# Patient Record
Sex: FEMALE | Race: BLACK OR AFRICAN AMERICAN | Employment: OTHER | URBAN - METROPOLITAN AREA
[De-identification: names, ages, dates, MRNs, and addresses within clinical notes are randomized per-mention and may not be internally consistent; named-entity substitution may affect disease eponyms.]

---

## 2017-01-19 PROBLEM — R07.9 CHEST PAIN: Status: ACTIVE | Noted: 2017-01-19

## 2017-01-19 PROBLEM — K21.9 GERD (GASTROESOPHAGEAL REFLUX DISEASE): Status: ACTIVE | Noted: 2017-01-19

## 2017-01-19 PROBLEM — F41.9 CHRONIC ANXIETY: Status: ACTIVE | Noted: 2017-01-19

## 2017-01-19 PROBLEM — F32.A DEPRESSION: Status: ACTIVE | Noted: 2017-01-19

## 2017-02-01 PROBLEM — R07.89 ATYPICAL CHEST PAIN: Status: ACTIVE | Noted: 2017-01-19

## 2017-03-09 ENCOUNTER — ANESTHESIA EVENT (OUTPATIENT)
Dept: ENDOSCOPY | Age: 82
End: 2017-03-09
Payer: MEDICARE

## 2017-03-09 RX ORDER — SODIUM CHLORIDE 0.9 % (FLUSH) 0.9 %
5-10 SYRINGE (ML) INJECTION AS NEEDED
Status: CANCELLED | OUTPATIENT
Start: 2017-03-09

## 2017-03-09 RX ORDER — SODIUM CHLORIDE, SODIUM LACTATE, POTASSIUM CHLORIDE, CALCIUM CHLORIDE 600; 310; 30; 20 MG/100ML; MG/100ML; MG/100ML; MG/100ML
100 INJECTION, SOLUTION INTRAVENOUS CONTINUOUS
Status: CANCELLED | OUTPATIENT
Start: 2017-03-09

## 2017-03-10 ENCOUNTER — HOSPITAL ENCOUNTER (OUTPATIENT)
Age: 82
Setting detail: OUTPATIENT SURGERY
Discharge: HOME | End: 2017-03-10
Attending: INTERNAL MEDICINE | Admitting: INTERNAL MEDICINE
Payer: MEDICARE

## 2017-03-10 ENCOUNTER — ANESTHESIA (OUTPATIENT)
Dept: ENDOSCOPY | Age: 82
End: 2017-03-10
Payer: MEDICARE

## 2017-03-10 ENCOUNTER — SURGERY (OUTPATIENT)
Age: 82
End: 2017-03-10

## 2017-03-10 VITALS
RESPIRATION RATE: 18 BRPM | HEIGHT: 60 IN | BODY MASS INDEX: 27.48 KG/M2 | TEMPERATURE: 98.6 F | WEIGHT: 140 LBS | HEART RATE: 62 BPM | DIASTOLIC BLOOD PRESSURE: 69 MMHG | OXYGEN SATURATION: 98 % | SYSTOLIC BLOOD PRESSURE: 124 MMHG

## 2017-03-10 PROCEDURE — 74011250636 HC RX REV CODE- 250/636: Performed by: ANESTHESIOLOGY

## 2017-03-10 PROCEDURE — 76060000032 HC ANESTHESIA 0.5 TO 1 HR: Performed by: INTERNAL MEDICINE

## 2017-03-10 PROCEDURE — 74011250636 HC RX REV CODE- 250/636

## 2017-03-10 PROCEDURE — 77030013991 HC SNR POLYP ENDOSC BSC -A: Performed by: INTERNAL MEDICINE

## 2017-03-10 PROCEDURE — 74011000250 HC RX REV CODE- 250

## 2017-03-10 PROCEDURE — 77030011640 HC PAD GRND REM COVD -A: Performed by: INTERNAL MEDICINE

## 2017-03-10 PROCEDURE — 77030009426 HC FCPS BIOP ENDOSC BSC -B: Performed by: INTERNAL MEDICINE

## 2017-03-10 PROCEDURE — 88305 TISSUE EXAM BY PATHOLOGIST: CPT | Performed by: INTERNAL MEDICINE

## 2017-03-10 PROCEDURE — 76040000025: Performed by: INTERNAL MEDICINE

## 2017-03-10 RX ORDER — PROPOFOL 10 MG/ML
INJECTION, EMULSION INTRAVENOUS AS NEEDED
Status: DISCONTINUED | OUTPATIENT
Start: 2017-03-10 | End: 2017-03-10 | Stop reason: HOSPADM

## 2017-03-10 RX ORDER — PROPOFOL 10 MG/ML
INJECTION, EMULSION INTRAVENOUS
Status: DISCONTINUED | OUTPATIENT
Start: 2017-03-10 | End: 2017-03-10 | Stop reason: HOSPADM

## 2017-03-10 RX ORDER — SODIUM CHLORIDE, SODIUM LACTATE, POTASSIUM CHLORIDE, CALCIUM CHLORIDE 600; 310; 30; 20 MG/100ML; MG/100ML; MG/100ML; MG/100ML
100 INJECTION, SOLUTION INTRAVENOUS CONTINUOUS
Status: DISCONTINUED | OUTPATIENT
Start: 2017-03-10 | End: 2017-03-10 | Stop reason: HOSPADM

## 2017-03-10 RX ORDER — LIDOCAINE HYDROCHLORIDE 20 MG/ML
INJECTION, SOLUTION EPIDURAL; INFILTRATION; INTRACAUDAL; PERINEURAL AS NEEDED
Status: DISCONTINUED | OUTPATIENT
Start: 2017-03-10 | End: 2017-03-10 | Stop reason: HOSPADM

## 2017-03-10 RX ADMIN — LIDOCAINE HYDROCHLORIDE 60 MG: 20 INJECTION, SOLUTION EPIDURAL; INFILTRATION; INTRACAUDAL; PERINEURAL at 10:53

## 2017-03-10 RX ADMIN — SODIUM CHLORIDE, SODIUM LACTATE, POTASSIUM CHLORIDE, AND CALCIUM CHLORIDE: 600; 310; 30; 20 INJECTION, SOLUTION INTRAVENOUS at 10:49

## 2017-03-10 RX ADMIN — PROPOFOL 10 MG: 10 INJECTION, EMULSION INTRAVENOUS at 10:53

## 2017-03-10 RX ADMIN — SODIUM CHLORIDE, SODIUM LACTATE, POTASSIUM CHLORIDE, AND CALCIUM CHLORIDE 100 ML/HR: 600; 310; 30; 20 INJECTION, SOLUTION INTRAVENOUS at 10:24

## 2017-03-10 RX ADMIN — PROPOFOL 50 MCG/KG/MIN: 10 INJECTION, EMULSION INTRAVENOUS at 10:53

## 2017-03-10 NOTE — PROGRESS NOTES
Pt discharged via wheelchair by Yazmin Jung RN. VSS on room air. Dr. Nury Newell spoke with pt and family at bedside.

## 2017-03-10 NOTE — IP AVS SNAPSHOT
[address] 
[phone] Patient: Ayana Sears MRN: [id number]:[id number] You are allergic to the following Allergen Reactions Penicillins Anaphylaxis Plavix (Clopidogrel) Other (comments) Joints ache Aspirin Other (comments) Stomach burn with uncoated aspirin; pt takes coated 81 mg aspirin daily Lipitor (Atorvastatin) Rash Swelling Other (comments) Joint swelling Lyrica (Pregabalin) Other (comments)  
 inflammation Ointment,Polyethylene Glycol Other (comments) Stomach cramps/cp Soy Itching Recent Documentation Height Weight Breastfeeding? BMI OB Status Smoking Status 1.524 m 63.5 kg No 27.34 kg/m2 Postmenopausal Never Smoker Emergency Contacts Name Discharge Info Relation Home Work Mobile Renetta Figueroa  Child [2] [phone] John Paul Abel  Daughter [21] [phone] DangeloPriscilaMachelle  Other Relative [6] [phone] About your hospitalization You were admitted on:  March 10, 2017 You last received care in the:  [facility] You were discharged on:  March 10, 2017 Unit phone number:  [phone] Why you were hospitalized Your primary diagnosis was:  Not on File Providers Seen During Your Hospitalizations Provider Role Specialty Primary office phone Ruy Wisdom MD Attending Provider Gastroenterology [phone] Your Primary Care Physician (PCP) Primary Care Physician Office Phone Office Fax Bre[place] Daily [phone] Follow-up Information None Current Discharge Medication List  
  
ASK your doctor about these medications Dose & Instructions Dispensing Information Comments Morning Noon Evening Bedtime  
 aspirin 81 mg tablet Your next dose is: Today, Tomorrow Other:  _________ Dose:  81 mg Take 81 mg by mouth every morning. Patient has cardiac stent x 1 Refills:  0  
     
   
   
   
  
 CO Q-10 100 mg capsule Generic drug:  co-enzyme Q-10 Your next dose is: Today, Tomorrow Other:  _________ Dose:  100 mg Take 100 mg by mouth daily. Refills:  0  
     
   
   
   
  
 magnesium oxide 400 mg tablet Commonly known as:  MAG-OX Your next dose is: Today, Tomorrow Other:  _________ Dose:  400 mg Take 1 Tab by mouth daily. Quantity:  30 Tab Refills:  5  
     
   
   
   
  
 nitroglycerin 0.4 mg SL tablet Commonly known as:  NITROSTAT Your next dose is: Today, Tomorrow Other:  _________ Dose:  0.4 mg  
1 Tab by SubLINGual route every five (5) minutes as needed for Chest Pain (If no relief after 2 tabs take the third tab and call EMS). Quantity:  1 Bottle Refills:  1 NORVASC 10 mg tablet Generic drug:  amLODIPine Your next dose is: Today, Tomorrow Other:  _________ Dose:  10 mg Take 1 Tab by mouth every morning. [place] Manufactured only   Indications: HYPERTENSION Quantity:  90 Tab Refills:  3  
     
   
   
   
  
 polyethylene glycol 17 gram/dose powder Commonly known as:  Keenan Deck Your next dose is: Today, Tomorrow Other:  _________ Dose:  17 g Take 17 g by mouth nightly. Refills:  0 PROBIOTIC 4X 10-15 mg Tbec Generic drug:  B.infantis-B.ani-B.long-B.bifi Your next dose is: Today, Tomorrow Other:  _________ Take  by mouth. Refills:  0  
     
   
   
   
  
 red yeast rice extract 600 mg Cap Your next dose is: Today, Tomorrow Other:  _________ Dose:  600 mg Take 600 mg by mouth now. Refills:  0  
     
   
   
   
  
 VITAMIN D3 1,000 unit Cap Generic drug:  cholecalciferol Your next dose is: Today, Tomorrow Other:  _________ Dose:  1 Tab Take 1 Tab by mouth daily. Last dose 12/2/13 Refills:  0 Discharge Instructions Gastrointestinal Colonoscopy/Flexible Sigmoidoscopy - Lower Exam Discharge Instructions 1. Call Dr. Cheikh Ferreira at [phone] for any problems or questions. 2. Contact the doctors office for follow up appointment as directed 3. Medication may cause drowsiness for several hours, therefore, do not drive or operate machinery for remainder of the day. 4. No alcohol today. 5. Ordinarily, you may resume regular diet and activity after exam unless otherwise specified by your physician. 6. Because of air put into your colon during exam, you may experience some abdominal distension, relieved by the passage of gas, for several hours. 7. Contact your physician if you have any of the following: 
a. Excessive amount of bleeding  large amount when having a bowel movement. Occasional specks of blood with bowel movement would not be unusual. 
b. Severe abdominal pain 
c. Fever or Chills 8. Polyp Removal  follow these additional instructions 
a.  resume regular diet  
b. Take Metamucil  1 tablespoon in juice every morning for 3 days 
c. No Aspirin, Advil, Aleve, Nuprin, Ibuprofen, or medications that contain these drugs for 2 weeks. Any additional instructions- 
 
- F/u pathology - F/u with Dr Greg Lyles in 2months 
- Daily fiber and Miralax Instructions given to Torrie Miller and other family members. Instructions given by:  Dominic Guerin RN Discharge Orders None Introducing [facility]! Yasmine Salas introduces [employer] patient portal. Now you can access parts of your medical record, email your doctor's office, and request medication refills online. 1. In your internet browser, go to [email or web address] 2. Click on the First Time User? Click Here link in the Sign In box.  You will see the New Member Sign Up page. 3. Enter your [employer] Access Code exactly as it appears below. You will not need to use this code after youve completed the sign-up process. If you do not sign up before the expiration date, you must request a new code. · [employer] Access Code: [email or web address] Expires: 5/29/2017  1:44 PM 
 
4. Enter the last four digits of your Social Security Number (xxxx) and Date of Birth (mm/dd/yyyy) as indicated and click Submit. You will be taken to the next sign-up page. 5. Create a Screen Tonic ID. This will be your Screen Tonic login ID and cannot be changed, so think of one that is secure and easy to remember. 6. Create a [employer] T[employer] password. You can change your password at any time. 7. Enter your Password Reset Question and Answer. This can be used at a later time if you forget your password. 8. Enter your e-mail address. You will receive e-mail notification when new information is available in [address] 9. Click Sign Up. You can now view and download portions of your medical record. 10. Click the Download Summary menu link to download a portable copy of your medical information. If you have questions, please visit the Frequently Asked Questions section of the [employer] website. Remember, [employer] is NOT to be used for urgent needs. For medical emergencies, dial 911. Now available from your iPhone and Android! General Information Please provide this summary of care documentation to your next provider. Patient Signature:  ____________________________________________________________ Date:  ____________________________________________________________  
  
Althia Kras Provider Signature:  ____________________________________________________________ Date:  ____________________________________________________________

## 2017-03-10 NOTE — ANESTHESIA PREPROCEDURE EVALUATION
Anesthetic History   No history of anesthetic complications            Review of Systems / Medical History  Patient summary reviewed, nursing notes reviewed and pertinent labs reviewed    Pulmonary  Within defined limits                 Neuro/Psych         Psychiatric history     Cardiovascular    Hypertension: well controlled          CAD and cardiac stents (s/p NIMISHA 6/13)    Exercise tolerance: >4 METS     GI/Hepatic/Renal     GERD: well controlled           Endo/Other        Arthritis     Other Findings              Physical Exam    Airway  Mallampati: II  TM Distance: > 6 cm  Neck ROM: normal range of motion   Mouth opening: Normal     Cardiovascular    Rhythm: regular  Rate: normal         Dental    Dentition: Lower partial plate and Upper partial plate     Pulmonary  Breath sounds clear to auscultation               Abdominal         Other Findings            Anesthetic Plan    ASA: 3  Anesthesia type: total IV anesthesia          Induction: Intravenous  Anesthetic plan and risks discussed with: Patient

## 2017-03-10 NOTE — DISCHARGE INSTRUCTIONS
Gastrointestinal Colonoscopy/Flexible Sigmoidoscopy - Lower Exam Discharge Instructions  1. Call Dr. Beverly Antony at [phone] for any problems or questions. 2. Contact the doctors office for follow up appointment as directed  3. Medication may cause drowsiness for several hours, therefore, do not drive or operate machinery for remainder of the day. 4. No alcohol today. 5. Ordinarily, you may resume regular diet and activity after exam unless otherwise specified by your physician. 6. Because of air put into your colon during exam, you may experience some abdominal distension, relieved by the passage of gas, for several hours. 7. Contact your physician if you have any of the following:  a. Excessive amount of bleeding - large amount when having a bowel movement. Occasional specks of blood with bowel movement would not be unusual.  b. Severe abdominal pain  c. Fever or Chills  8. Polyp Removal - follow these additional instructions  a.  resume regular diet   b. Take Metamucil - 1 tablespoon in juice every morning for 3 days  c. No Aspirin, Advil, Aleve, Nuprin, Ibuprofen, or medications that contain these drugs for 2 weeks. Any additional instructions-    - F/u pathology  - F/u with Dr Hanh Corrales in 2months  - Daily fiber and Miralax    Instructions given to Natasha Crowe and other family members.   Instructions given by:  Deyanira Pineda RN

## 2017-03-10 NOTE — ANESTHESIA POSTPROCEDURE EVALUATION
Post-Anesthesia Evaluation and Assessment    Patient: Parth Calhoun MRN: [id number]  SSN: [id number]    YOB: 1935  Age: 80 y.o. Sex: female       Cardiovascular Function/Vital Signs  Visit Vitals    /63    Pulse 82    Temp 37 °C (98.6 °F)    Resp 16    Ht 5' (1.524 m)    Wt 63.5 kg (140 lb)    SpO2 98%    Breastfeeding No    BMI 27.34 kg/m2       Patient is status post total IV anesthesia anesthesia for Procedure(s):  COLONOSCOPY  BMI 28  ENDOSCOPIC POLYPECTOMY. Nausea/Vomiting: None    Postoperative hydration reviewed and adequate. Pain:  Pain Scale 1: Visual (03/10/17 1119)  Pain Intensity 1: 0 (03/10/17 1119)   Managed    Neurological Status: At baseline    Mental Status and Level of Consciousness: Awake. Pulmonary Status:   O2 Device: Nasal cannula (03/10/17 1119)   Adequate oxygenation and airway patent    Complications related to anesthesia: None    Post-anesthesia assessment completed.  No concerns    Signed By: Ling Chew MD     March 10, 2017

## 2017-03-10 NOTE — PROCEDURES
DATE OF PROCEDURE: 3/10/17    REQUESTING PHYSICIAN: Dr Adina Landers: Colonoscopy. ENDOSCOPIST: Pamella Pacheco M.D. PREOPERATIVE DIAGNOSIS: CRCS, Hx of colon cancer/polyps, Fhx of colon cancer    POSTOPERATIVE DIAGNOSIS: Colon polyps, Diverticulosis, Melanosis Coli, Anal stenosis     SEDATION: MAC    INSTRUMENT: PCF H190    DESCRIPTION OF PROCEDURE:  After informed consent was obtained the patient was placed in the left lateral decubitus position. Intravenous sedation was administered as above. After adequate sedation had been achieved, digital rectal examination was performed. The colonoscope was then inserted through the anus and followed the course of the rectum and colon to the cecum, which was confirmed by visualization of the ileocecal valve and appendiceal orifice. The colonoscope was withdrawn from that point, performing a careful survey of the mucosa. Retroflexion was performed in the rectum. 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 Sigmoid diverticulosis noted. Mild anal stenosis (noted on ANDREW). 4mm proximal ascending polyp removed with bx forceps. 7mm mid ascending polyp removed with hot snare. 5mm descending polyp removed with bx forceps. 4mm rectal polyp removed with bx forceps.      Estimated Blood Loss:  0 cc-min    IMPRESSION:  Colon polyps  Melanosis coli  Anal stenosis  Diverticulosis     PLAN:  - F/u path  - F/u with Dr Piotr Reynolds in 2mos  - If still trouble defecating, consider anal dilation in the future  - Daily fiber and Miralax    P Ene Marcus MD

## 2017-05-18 PROBLEM — R07.89 ATYPICAL CHEST PAIN: Status: RESOLVED | Noted: 2017-01-19 | Resolved: 2017-05-18

## 2017-08-09 ENCOUNTER — HOSPITAL ENCOUNTER (OUTPATIENT)
Dept: MAMMOGRAPHY | Age: 82
Discharge: HOME | End: 2017-08-09
Attending: INTERNAL MEDICINE
Payer: MEDICARE

## 2017-08-09 DIAGNOSIS — Z12.31 VISIT FOR SCREENING MAMMOGRAM: ICD-10-CM

## 2017-08-09 PROCEDURE — 77067 SCR MAMMO BI INCL CAD: CPT

## 2018-02-09 ENCOUNTER — HOSPITAL ENCOUNTER (OUTPATIENT)
Dept: GENERAL RADIOLOGY | Age: 83
Discharge: HOME | End: 2018-02-09
Attending: PHYSICIAN ASSISTANT
Payer: MEDICARE

## 2018-02-09 DIAGNOSIS — R05.9 COUGH: ICD-10-CM

## 2018-02-09 DIAGNOSIS — R50.9 FEVER AND CHILLS: ICD-10-CM

## 2018-02-09 PROCEDURE — 71046 X-RAY EXAM CHEST 2 VIEWS: CPT

## 2018-02-09 NOTE — PROGRESS NOTES
Results discussed with patient. Antibiotics sent in. Needs follow-up appt on Friday, Feb 23 @ 11:00 am - she was notified.

## 2018-03-23 ENCOUNTER — HOSPITAL ENCOUNTER (OUTPATIENT)
Dept: GENERAL RADIOLOGY | Age: 83
Discharge: HOME | End: 2018-03-23
Payer: MEDICARE

## 2018-03-23 DIAGNOSIS — R91.8 BILATERAL PULMONARY INFILTRATES ON CHEST X-RAY: ICD-10-CM

## 2018-03-23 PROCEDURE — 71046 X-RAY EXAM CHEST 2 VIEWS: CPT

## 2018-09-09 ENCOUNTER — APPOINTMENT (OUTPATIENT)
Dept: GENERAL RADIOLOGY | Age: 83
End: 2018-09-09
Attending: NURSE PRACTITIONER
Payer: MEDICARE

## 2018-09-09 ENCOUNTER — HOSPITAL ENCOUNTER (EMERGENCY)
Age: 83
Discharge: HOME | End: 2018-09-09
Attending: EMERGENCY MEDICINE
Payer: MEDICARE

## 2018-09-09 VITALS
WEIGHT: 140 LBS | TEMPERATURE: 98.1 F | HEIGHT: 60 IN | RESPIRATION RATE: 18 BRPM | OXYGEN SATURATION: 96 % | HEART RATE: 52 BPM | BODY MASS INDEX: 27.48 KG/M2 | DIASTOLIC BLOOD PRESSURE: 62 MMHG | SYSTOLIC BLOOD PRESSURE: 135 MMHG

## 2018-09-09 DIAGNOSIS — G89.29 CHRONIC NECK PAIN: ICD-10-CM

## 2018-09-09 DIAGNOSIS — M16.11 OSTEOARTHRITIS OF RIGHT HIP, UNSPECIFIED OSTEOARTHRITIS TYPE: Primary | ICD-10-CM

## 2018-09-09 DIAGNOSIS — M54.2 CHRONIC NECK PAIN: ICD-10-CM

## 2018-09-09 PROCEDURE — 99283 EMERGENCY DEPT VISIT LOW MDM: CPT | Performed by: NURSE PRACTITIONER

## 2018-09-09 PROCEDURE — 73502 X-RAY EXAM HIP UNI 2-3 VIEWS: CPT

## 2018-09-09 PROCEDURE — 74011000250 HC RX REV CODE- 250: Performed by: NURSE PRACTITIONER

## 2018-09-09 PROCEDURE — 72050 X-RAY EXAM NECK SPINE 4/5VWS: CPT

## 2018-09-09 RX ORDER — LIDOCAINE 50 MG/G
PATCH TOPICAL
Qty: 10 EACH | Refills: 0 | Status: SHIPPED | OUTPATIENT
Start: 2018-09-09 | End: 2019-05-14

## 2018-09-09 RX ORDER — LIDOCAINE 4 G/100G
2 PATCH TOPICAL
Status: DISCONTINUED | OUTPATIENT
Start: 2018-09-09 | End: 2018-09-09 | Stop reason: HOSPADM

## 2018-09-09 NOTE — ED PROVIDER NOTES
HPI Comments: 41-year-old female with history of hypertension, coronary artery disease, diastolic dysfunction and peptic ulcer disease presents with left-sided neck pain, right hip pain, as well as right arm and right body pain. She reports that much of her pain is chronic. She has had problems with her right hip since she had replacement 4 years ago however she noted swelling to the right lateral hip today. Moderate pain is noted as well. She is ambulatory to the emergency department however. She complains of spasms of the right side from her hip to her ribs. This is intermittent comes and goes. She also complains of pain to her right arm that has come and gone since she had hospitalization done for her cardiac stent 3 years ago. However her chief complaint is that of left lateral neck pain. She reports that this has been ongoing for the last month or so. It occurs every morning upon waking and it persists throughout the day. She goes to exercise to help manage the pain but it continues throughout the day. Pain radiates to her shoulders. She denies numbness or tingling. Patient is a 80 y.o. female presenting with neck pain. The history is provided by the patient. No  was used. Neck Pain This is a recurrent problem. The current episode started more than 1 week ago. The problem has been gradually worsening. There has been no fever. The pain is present in the left side. The quality of the pain is described as aching. The pain radiates to the left shoulder. The pain is moderate. The symptoms are aggravated by bending, twisting and certain positions. The pain is worse during the day. Pertinent negatives include no photophobia, no visual change, no chest pain, no syncope, no numbness, no weight loss, no headaches, no bowel incontinence, no bladder incontinence, no leg pain, no paresis, no tingling and no weakness. Past Medical History:  
Diagnosis Date  Adverse reaction to statin medication 4/14/2015  Chronic constipation  Colon cancer ([facility] [address] 1992  
 in situ  Coronary atherosclerosis of native coronary artery 6/11/2013  
 stent placement  Grief reaction with prolonged bereavement  Hypertension   
 controlled with med  Mild diastolic dysfunction 94/15/8861 Per ECHO  Mitral regurgitation 02/18/2009 Mild to Moderate Per ECHO  Nerve pain   
 right side head since 8-13-13-pt fell and hit head  OA (osteoarthritis) 6/11/2013  PUD (peptic ulcer disease) 1980's Past Surgical History:  
Procedure Laterality Date  COLONOSCOPY N/A 3/10/2017 COLONOSCOPY  BMI 28 performed by Saima Malin MD at [facility] [address]  04/2012 [place]  HX HIP REPLACEMENT Right 11/13/2014  HX ORTHOPAEDIC Left Hammer Toe Repair/Reconstruction  HX TUBAL LIGATION Family History:  
Problem Relation Age of Onset 24 [facility] Stroke Father 80  Breast Cancer Sister  Cancer Sister  Cancer Sister  Hypertension Mother  Cancer Mother Colon  Breast Cancer Sister  Alzheimer Brother  Diabetes Sister  Dementia Brother  Seizures Brother  Alcohol abuse Brother  Alcohol abuse Brother  Coronary Artery Disease Brother  Arthritis-osteo Brother  Seizures Brother  No Known Problems Maternal Grandmother Social History Social History  Marital status:  Spouse name: N/A  
 Number of children: N/A  
 Years of education: N/A Occupational History  Not on file. Social History Main Topics  Smoking status: Never Smoker  Smokeless tobacco: Never Used  Alcohol use No  
 Drug use: No  
 Sexual activity: No  
 
Other Topics Concern  Not on file Social History Narrative ALLERGIES: Penicillins; Plavix [clopidogrel];  Aspirin; Lipitor [atorvastatin]; Lyrica [pregabalin]; Polyethylene glycol ointment; Soy; Tramadol; and Voltaren [diclofenac sodium] Review of Systems Constitutional: Negative for chills, fever and weight loss. HENT: Negative for ear pain and facial swelling. Eyes: Negative for photophobia and discharge. Respiratory: Negative for cough and shortness of breath. Cardiovascular: Negative for chest pain, palpitations and syncope. Gastrointestinal: Negative for bowel incontinence, nausea and vomiting. Genitourinary: Negative for bladder incontinence, flank pain and pelvic pain. Musculoskeletal: Positive for myalgias and neck pain. Negative for gait problem. Skin: Negative for color change and wound. Neurological: Negative for tingling, weakness, numbness and headaches. Psychiatric/Behavioral: Negative for confusion and decreased concentration. Vitals:  
 09/09/18 1316 BP: 149/78 Pulse: 61 Resp: 18 Temp: 98.1 °F (36.7 °C) SpO2: 95% Weight: 63.5 kg (140 lb) Height: 5' (1.524 m) Physical Exam  
Constitutional: She is oriented to person, place, and time. She appears well-developed and well-nourished. HENT:  
Head: Normocephalic and atraumatic. Eyes: EOM are normal. Pupils are equal, round, and reactive to light. Neck: Normal range of motion. Cardiovascular: Normal rate and regular rhythm. Pulmonary/Chest: Effort normal and breath sounds normal.  
Musculoskeletal: Normal range of motion. She exhibits tenderness. Legs: 
Neurological: She is alert and oriented to person, place, and time. Skin: Skin is warm and dry. Psychiatric: She has a normal mood and affect. Her behavior is normal. Judgment and thought content normal.  
Nursing note and vitals reviewed. MDM Number of Diagnoses or Management Options Diagnosis management comments: Will xray areas as pt has complained with pain 4:15 PM  xrays indicate no acute findings. Will dc with lidocaine patches Amount and/or Complexity of Data Reviewed Tests in the radiology section of CPT®: ordered and reviewed Risk of Complications, Morbidity, and/or Mortality Presenting problems: minimal 
Diagnostic procedures: minimal 
Management options: minimal 
 
Patient Progress Patient progress: stable ED Course Procedures

## 2018-09-09 NOTE — DISCHARGE INSTRUCTIONS
Arthritis: Care Instructions  Your Care Instructions  Arthritis, also called osteoarthritis, is a breakdown of the cartilage that cushions your joints. When the cartilage wears down, your bones rub against each other. This causes pain and stiffness. Many people have some arthritis as they age. Arthritis most often affects the joints of the spine, hands, hips, knees, or feet. You can take simple measures to protect your joints, ease your pain, and help you stay active. Follow-up care is a key part of your treatment and safety. Be sure to make and go to all appointments, and call your doctor if you are having problems. It's also a good idea to know your test results and keep a list of the medicines you take. How can you care for yourself at home? · Stay at a healthy weight. Being overweight puts extra strain on your joints. · Talk to your doctor or physical therapist about exercises that will help ease joint pain. ¨ Stretch. You may enjoy gentle forms of yoga to help keep your joints and muscles flexible. ¨ Walk instead of jog. Other types of exercise that are less stressful on the joints include riding a bicycle, swimming, mirza chi, or water exercise. ¨ Lift weights. Strong muscles help reduce stress on your joints. Stronger thigh muscles, for example, take some of the stress off of the knees and hips. Learn the right way to lift weights so you do not make joint pain worse. · Take your medicines exactly as prescribed. Call your doctor if you think you are having a problem with your medicine. · Take pain medicines exactly as directed. ¨ If the doctor gave you a prescription medicine for pain, take it as prescribed. ¨ If you are not taking a prescription pain medicine, ask your doctor if you can take an over-the-counter medicine. · Use a cane, crutch, walker, or another device if you need help to get around. These can help rest your joints.  You also can use other things to make life easier, such as a higher toilet seat and padded handles on kitchen utensils. · Do not sit in low chairs, which can make it hard to get up. · Put heat or cold on your sore joints as needed. Use whichever helps you most. You also can take turns with hot and cold packs. ¨ Apply heat 2 or 3 times a day for 20 to 30 minutes-using a heating pad, hot shower, or hot pack-to relieve pain and stiffness. ¨ Put ice or a cold pack on your sore joint for 10 to 20 minutes at a time. Put a thin cloth between the ice and your skin. When should you call for help? Call your doctor now or seek immediate medical care if:    · You have sudden swelling, warmth, or pain in any joint.     · You have joint pain and a fever or rash.     · You have such bad pain that you cannot use a joint.    Watch closely for changes in your health, and be sure to contact your doctor if:    · You have mild joint symptoms that continue even with more than 6 weeks of care at home.     · You have stomach pain or other problems with your medicine. Where can you learn more? Go to http://moisésMobiibarbara.info/. Enter Y587 in the search box to learn more about \"Arthritis: Care Instructions. \"  Current as of: October 10, 2017  Content Version: 11.7  © 4302-4254 [employer]. Care instructions adapted under license by [employer] (which disclaims liability or warranty for this information). If you have questions about a medical condition or this instruction, always ask your healthcare professional. [place] [address] any warranty or liability for your use of this information. Learning About Managing Chronic Pain  What is a plan for pain management? A pain management plan helps you find ways to control pain with side effects you can live with. Some diseases and injuries can cause pain that lasts a long time. Constant pain can make you depressed. It can cause stress and make it hard for you to eat and sleep.  But you don't need to live with uncontrolled pain. How can you plan for managing your pain? You and your doctor will work to make your plan. Your plan can include more than one type of pain control. You may take prescription or over-the-counter drugs. You can also try physical treatments, like massage and acupuncture. Other things can help too, such as meditation or a type of therapy to change how you think about your pain. It's important to let your doctor know how you prefer to control your pain. Sometimes the goal of a pain management plan isn't to totally get rid of pain. Instead, it might be to reduce the pain enough that daily activities are easier. If your pain isn't controlled well enough, talk with your doctor. You may need to make a new plan. Or your doctor may refer you to a specialist.  What medicines are used? Your doctor may prescribe medicine to help with your pain. If you aren't taking a prescription medicine, you may be able to take an over-the-counter one. Here are the main types of medicine for chronic pain. · Non-opioids. These are things like aspirin, acetaminophen, and nonsteroidal anti-inflammatory drugs (NSAIDs). They work by blocking (or reducing) the feeling of pain. And some also reduce swelling. They usually relieve pain for 6 to 12 hours at a time. · Opioids. Morphine, codeine, and oxycodone are some examples. Opioids relieve pain by changing the way your body feels pain and the way you feel about pain. · Other medicines. Some medicines seem to change the way your brain senses pain. These include things like:  ¨ Antidepressants, anti-seizure medicines, and anti-anxiety medicines. ¨ Nerve block injections. Medicines are the most common treatment for pain. But to feel better, you'll need to do more than take medicine. You can also do things like reducing your stress level and changing how you think. How can you take medicine safely? Medicines can help you get better.  But they can also be dangerous, especially if you don't take them the right way. Be safe with medicines. Read and follow all instructions on the label. If the medicine you take causes side effects such as constipation or nausea, you may need to take other medicines for those problems. Talk to your doctor about any side effects you have. If you were prescribed an opioid pain reliever, your care team will give you information on how to use it safely. You will also get directions for how to safely store the medicine and how to get rid of any that's left over. Follow these instructions carefully. What physical treatments can help? Physical treatments can be an important part of managing chronic pain. You may find that combining more than one treatment helps the most.  These treatments can include:  · Heat or cold. This can help arthritis, sore muscles, and other aches. · Hydrotherapy. It uses flowing water to relax muscles. · Massage. Massage involves rubbing the soft tissues of the body. It eases tension and pain. · Transcutaneous electrical nerve stimulation (TENS). This treatment uses a gentle electric current applied to the skin for pain relief. · Acupuncture. This is a form of traditional [facility] medicine. It uses very thin needles inserted into certain points of the body. · Physical therapy. This treatment uses stretches and exercises to reduce pain and help you move better. If you get physical therapy, make sure to do any home exercises or stretching your therapist has prescribed. Stay as active as you can. Try to get some physical activity every day. What other things can help? You can manage chronic pain by using things other than medicines or physical treatments. For example, you can keep track of your pain in a pain diary. It can help you understand how the things you do affect your pain. Reducing stress and tension can reduce pain. And being more aware of your thought patterns can be helpful.  In some cases, shifting how you think about pain can affect how you feel. Here are some options to think about:  · Breathing exercises and meditation. These techniques can help you focus your attention, relax, and get rid of tension. · Guided imagery. This is a series of thoughts and images that can focus your attention away from your pain. · Hypnosis. It's a state of focused concentration that makes you less aware of your surroundings. · Cognitive behavioral therapy. This type of counseling helps you change your thought patterns. · Yoga. Stretching and exercises can reduce stress and improve flexibility. If what you're doing to control your pain isn't working, or if you're feeling depressed, talk to your doctor. He or she can help you change your pain management plan and find resources for emotional support. Where can you learn more? Go to http://moisés-barbara.info/. Enter P119 in the search box to learn more about \"Learning About Managing Chronic Pain. \"  Current as of: December 11, 2017  Content Version: 11.7  © 8331-8097 [employer]. Care instructions adapted under license by [employer] (which disclaims liability or warranty for this information). If you have questions about a medical condition or this instruction, always ask your healthcare professional. [address] any warranty or liability for your use of this information.

## 2018-09-09 NOTE — ED NOTES
I have reviewed discharge instructions with the patient. The patient verbalized understanding. Patient left ED via Discharge Method: ambulatory to Home with daughter Opportunity for questions and clarification provided. Patient given 1 scripts. To continue your aftercare when you leave the hospital, you may receive an automated call from our care team to check in on how you are doing. This is a free service and part of our promise to provide the best care and service to meet your aftercare needs.  If you have questions, or wish to unsubscribe from this service please call [phone]. Thank you for Choosing our [facility]. Contraindicated

## 2018-09-27 ENCOUNTER — HOSPITAL ENCOUNTER (OUTPATIENT)
Dept: CT IMAGING | Age: 83
Discharge: HOME | End: 2018-09-27
Attending: PHYSICIAN ASSISTANT
Payer: MEDICARE

## 2018-09-27 ENCOUNTER — HOSPITAL ENCOUNTER (OUTPATIENT)
Dept: ULTRASOUND IMAGING | Age: 83
Discharge: HOME | End: 2018-09-27
Attending: PHYSICIAN ASSISTANT
Payer: MEDICARE

## 2018-09-27 DIAGNOSIS — R42 DIZZINESS: ICD-10-CM

## 2018-09-27 DIAGNOSIS — G44.039 NONINTRACTABLE PAROXYSMAL HEMICRANIA, UNSPECIFIED CHRONICITY PATTERN: ICD-10-CM

## 2018-09-27 PROCEDURE — 70450 CT HEAD/BRAIN W/O DYE: CPT

## 2018-09-27 PROCEDURE — 93880 EXTRACRANIAL BILAT STUDY: CPT

## 2018-09-28 NOTE — PROGRESS NOTES
Patient notified of CT Head results and verbalized understanding. Patient wants to know what is next step if test are OK but she continues to have symptoms

## 2018-10-01 NOTE — PROGRESS NOTES
Then it is likely related to her neck issues and muscle spasms as we originally discussed in the office.

## 2018-11-27 ENCOUNTER — HOSPITAL ENCOUNTER (OUTPATIENT)
Dept: MAMMOGRAPHY | Age: 83
Discharge: HOME | End: 2018-11-27
Attending: INTERNAL MEDICINE
Payer: MEDICARE

## 2018-11-27 DIAGNOSIS — Z12.31 VISIT FOR SCREENING MAMMOGRAM: ICD-10-CM

## 2018-11-27 PROCEDURE — 77067 SCR MAMMO BI INCL CAD: CPT

## 2018-12-13 ENCOUNTER — HOSPITAL ENCOUNTER (OUTPATIENT)
Dept: PHYSICAL THERAPY | Age: 83
End: 2018-12-13
Attending: NURSE PRACTITIONER

## 2019-02-25 ENCOUNTER — HOSPITAL ENCOUNTER (OUTPATIENT)
Dept: PHYSICAL THERAPY | Age: 84
Discharge: HOME | End: 2019-02-25
Attending: NURSE PRACTITIONER
Payer: MEDICARE

## 2019-02-25 DIAGNOSIS — R53.1 WEAKNESS GENERALIZED: ICD-10-CM

## 2019-02-25 PROCEDURE — 97161 PT EVAL LOW COMPLEX 20 MIN: CPT

## 2019-02-25 NOTE — THERAPY EVALUATION
Treasure Camara  : 1935  Primary: [facility] [place] [employer] [facility]  Secondary: [facility] at [address], [address] [facility], [place] [address].  [phone]   [phone]        OUTPATIENT PHYSICAL THERAPY:Initial Assessment and Discharge Summary 2019   ICD-10: Treatment Diagnosis: Difficulty in walking, not elsewhere classified (R26.2)  Precautions/Allergies:   Penicillins; Plavix [clopidogrel]; Aspirin; Lipitor [atorvastatin]; Lyrica [pregabalin]; Polyethylene glycol ointment; Soy; Tramadol; and Voltaren [diclofenac sodium]   MD Orders: Evaluate and treat  MEDICAL/REFERRING DIAGNOSIS:  Weakness generalized [R53.1]   DATE OF ONSET: Around two weeks   REFERRING PHYSICIAN: Preethi Jackson NP  RETURN PHYSICIAN APPOINTMENT: unknown      INITIAL ASSESSMENT:  Ms. Eric Sorto presents with bilateral lower extremity weakness and difficulty walking due to recent upper respiratory infection. Patient feels she is slowly getting stronger and recovering from her illness. Patient is a member of [employer] and is planning on returning to exercise. Home exercise program offered to patient, but patient reports she already knows which exercises to do. No problems or goals stated. Patient discharged from physical therapy. Patient agreeable with this plan. Thank you for this referral.      PROBLEM LIST (Impacting functional limitations):  1. No problems stated  INTERVENTIONS PLANNED: (Treatment may consist of any combination of the following)  1. No interventions stated   TREATMENT PLAN:  Effective Dates: 2019. Frequency/Duration: One time appointment for initial evaluation. Patient discharged from physical therapy. GOALS: (Goals have been discussed and agreed upon with patient.)  Short-Term Functional Goals: Time Frame:   1. No short term goals stated  Discharge Goals: Time Frame:   1.  No discharge goals stated    Regarding Kerry Welch's therapy, I certify that the treatment plan above will be carried out by a therapist or under their direction. Thank you for this referral,  Michelle Nava, PT     Referring Physician Signature: Yamilka Walker NP              Date                    The information in this section was collected on 2/25/2019 (except where otherwise noted). HISTORY:   History of Present Injury/Illness (Reason for Referral):  Patient reports developing a respiratory infection several weeks ago. Patient was put on antibiotics for infection. Patient has become weaker since her illness. Patient feels she is slowly recovering and becoming stronger. Patient rates dizziness as 0/10 and pain level as 0/10. Patient has had no falls. Patient uses no assistive device. Past Medical History/Comorbidities:   Ms. Chris Elena  has a past medical history of Adverse reaction to statin medication (4/14/2015), Chronic constipation, Colon cancer ([address] (1992), Coronary atherosclerosis of native coronary artery (6/11/2013), Grief reaction with prolonged bereavement, Hypertension, Menopause, Mild diastolic dysfunction (08/32/5742), Mitral regurgitation (02/18/2009), Nerve pain, OA (osteoarthritis) (6/11/2013), and PUD (peptic ulcer disease) (1980's). She also has no past medical history of Aneurysm ([address] Arrhythmia, Autoimmune disease ([address] Cancer ([address] Difficult intubation, Endocrine disease, Heart failure ([address] Ill-defined condition, Infectious disease, Liver disease, Malignant hyperthermia due to anesthesia, Morbid obesity ([address] Nausea & vomiting, Pseudocholinesterase deficiency, Seizures ([address] Sleep disorder, Thromboembolus ([address] Unspecified adverse effect of anesthesia, or Unspecified sleep apnea. Ms. Chris Elena  has a past surgical history that includes hx hip replacement (Right, 11/13/2014); hx orthopaedic (Left); colonoscopy (N/A, 3/10/2017); hx coronary stent placement (04/2012); hx tubal ligation; and hx hemorrhoidectomy (1970).   Social History/Living Environment:     Lives alone . Prior Level of Function/Work/Activity:  Independent. Retired. Exercising three times a week at [employer] prior to illness. Dominant Side:         RIGHT  Personal Factors:          Sex:  female        Age:  80 y.o. Ambulatory/Rehab Services H2 Model Falls Risk Assessment    Risk Factors:       No Risk Factors Identified Ability to Rise from Chair:       (0)  Ability to rise in a single movement    Falls Prevention Plan:       No modifications necessary   Total: (5 or greater = High Risk): 0    ©2010 [facility] of [employer]. All Rights Reserved. [facility] Patent #[id number]. Federal Law prohibits the replication, distribution or use without written permission from [facility] [employer]     Current Medications:       Current Outpatient Medications:     omega 3-DHA-EPA-fish oil (FISH OIL) 1,000 mg (120 mg-180 mg) capsule, Take 1 Cap by mouth daily. , Disp: 30 Cap, Rfl: 11    NORVASC 10 mg tablet, Take 1 Tab by mouth every morning. Brand medically necessary!, Disp: 90 Tab, Rfl: 3    polyethylene glycol (MIRALAX) 17 gram/dose powder, Take 17 g by mouth nightly., Disp: 510 g, Rfl: 11    OTHER, Indications: deep tissie, Disp: , Rfl:     lidocaine (LIDODERM) 5 %, Apply patch to the affected area for 12 hours a day and remove for 12 hours a day., Disp: 10 Each, Rfl: 0    ascorbic acid, vitamin C, (VITAMIN C) 1,000 mg tablet, Take  by mouth., Disp: , Rfl:     docusate sodium (STOOL SOFTENER) 50 mg capsule, Take 50 mg by mouth two (2) times a day., Disp: , Rfl:     OTHER, Hibiscus Tea, Disp: , Rfl:     cranberry extract 450 mg tab, Take  by mouth daily. , Disp: , Rfl:     OTHER, Sore No More-OTC Gel for pain relief, Disp: , Rfl:     nitroglycerin (NITROSTAT) 0.4 mg SL tablet, 1 Tab by SubLINGual route every five (5) minutes as needed for Chest Pain (If no relief after 2 tabs take the third tab and call EMS). , Disp: 1 Bottle, Rfl: 1    magnesium oxide (MAG-OX) 400 mg tablet, Take 1 Tab by mouth daily. , Disp: 30 Tab, Rfl: 5    Cholecalciferol, Vitamin D3, (VITAMIN D3) 1,000 unit Cap, Take 1 Tab by mouth daily. Last dose 12/2/13, Disp: , Rfl:     aspirin 81 mg tablet, Take 81 mg by mouth every morning. Patient has cardiac stent x 1, Disp: , Rfl:    Date Last Reviewed:  2/25/2019   Number of Personal Factors/Comorbidities that affect the Plan of Care: 0: LOW COMPLEXITY   EXAMINATION:   Observation/Orthostatic Postural Assessment:          Slight forward head/rounded shoulders posture  Functional Mobility:         Gait/Ambulation:  Patient ambulates with normal gait pattern. Strength:          Hip flexion 4-/5, hip abduction 4-/5, quadriceps 4/5, hamstrings 4/5, ankle dorsiflexion 4/5, and ankle plantarflexion 4/5. Sensation:         Decreased bilateral feet. Postural Control & Balance:  · Manzo Balance Scale:  47/56.   (A score less than 45/56 indicates high risk of falls)     · Dynamic Gait Index:  22/24.   (A score less than or equal to19/24 is abnormal and predictive of falls)        Body Structures Involved:  1. Muscles Body Functions Affected:  1. Neuromusculoskeletal Activities and Participation Affected:  1. Mobility   Number of elements (examined above) that affect the Plan of Care: 3: MODERATE COMPLEXITY   CLINICAL PRESENTATION:   Presentation: Stable and uncomplicated: LOW COMPLEXITY   CLINICAL DECISION MAKING:   Outcome Measure: Tool Used: Dynamic Gait Index  Score:  Initial: 22/24 Most Recent: X/24 (Date: -- )   Interpretation of Score: Each section is scored on a 0-3 scale, 0 representing the patients inability to perform the task and 3 representing independence. The scores of each section are added together for a total score of 24. Any score below 19 indicates increased risk for falls.          Use of outcome tool(s) and clinical judgement create a POC that gives a: Clear prediction of patient's progress: LOW COMPLEXITY TREATMENT:   (In addition to Assessment/Re-Assessment sessions the following treatments were rendered)  Pre-treatment Symptoms/Complaints:  Weakness and difficulty walking   Pain: Initial:   Pain Intensity 1: 0  Post Session:  0     Initial Evaluation: 45 minutes    [employer]  Treatment/Session Assessment:    · Response to Treatment:  Tolerated without complaints. · Recommendations/Intent for next treatment session: Patient discharged from physical therapy.   Total Treatment Duration:  PT Patient Time In/Time Out  Time In: 1115  Time Out: [facility], PT    Future Appointments   Date Time Provider Yolie Connors   6/6/2019 10:00 AM MAT LAB [facility] MAT MAT   6/11/2019  1:30 PM Jovan Unger PA-C [facility] MAT MAT

## 2019-06-03 ENCOUNTER — HOSPITAL ENCOUNTER (OUTPATIENT)
Dept: LAB | Age: 84
Discharge: HOME | End: 2019-06-03
Attending: INTERNAL MEDICINE
Payer: MEDICARE

## 2019-06-03 LAB
ANION GAP SERPL CALC-SCNC: 5 MMOL/L (ref 7–16)
BASOPHILS # BLD: 0 K/UL (ref 0–0.2)
BASOPHILS NFR BLD: 1 % (ref 0–2)
BUN SERPL-MCNC: 15 MG/DL (ref 8–23)
CALCIUM SERPL-MCNC: 9.2 MG/DL (ref 8.3–10.4)
CHLORIDE SERPL-SCNC: 105 MMOL/L (ref 98–107)
CO2 SERPL-SCNC: 29 MMOL/L (ref 21–32)
CREAT SERPL-MCNC: 0.78 MG/DL (ref 0.6–1)
DIFFERENTIAL METHOD BLD: ABNORMAL
EOSINOPHIL # BLD: 0 K/UL (ref 0–0.8)
EOSINOPHIL NFR BLD: 1 % (ref 0.5–7.8)
ERYTHROCYTE [DISTWIDTH] IN BLOOD BY AUTOMATED COUNT: 13.2 % (ref 11.9–14.6)
GLUCOSE SERPL-MCNC: 86 MG/DL (ref 65–100)
HCT VFR BLD AUTO: 41 % (ref 35.8–46.3)
HGB BLD-MCNC: 13.3 G/DL (ref 11.7–15.4)
IMM GRANULOCYTES # BLD AUTO: 0 K/UL (ref 0–0.5)
IMM GRANULOCYTES NFR BLD AUTO: 0 % (ref 0–5)
INR PPP: 1
LYMPHOCYTES # BLD: 1.7 K/UL (ref 0.5–4.6)
LYMPHOCYTES NFR BLD: 42 % (ref 13–44)
MCH RBC QN AUTO: 31.4 PG (ref 26.1–32.9)
MCHC RBC AUTO-ENTMCNC: 32.4 G/DL (ref 31.4–35)
MCV RBC AUTO: 96.7 FL (ref 79.6–97.8)
MONOCYTES # BLD: 0.3 K/UL (ref 0.1–1.3)
MONOCYTES NFR BLD: 8 % (ref 4–12)
NEUTS SEG # BLD: 1.9 K/UL (ref 1.7–8.2)
NEUTS SEG NFR BLD: 48 % (ref 43–78)
NRBC # BLD: 0 K/UL (ref 0–0.2)
PLATELET # BLD AUTO: 219 K/UL (ref 150–450)
PMV BLD AUTO: 10.6 FL (ref 9.4–12.3)
POTASSIUM SERPL-SCNC: 4.2 MMOL/L (ref 3.5–5.1)
PROTHROMBIN TIME: 12.8 SEC (ref 11.7–14.5)
RBC # BLD AUTO: 4.24 M/UL (ref 4.05–5.2)
SODIUM SERPL-SCNC: 139 MMOL/L (ref 136–145)
WBC # BLD AUTO: 4 K/UL (ref 4.3–11.1)

## 2019-06-03 PROCEDURE — 36415 COLL VENOUS BLD VENIPUNCTURE: CPT

## 2019-06-03 PROCEDURE — 85610 PROTHROMBIN TIME: CPT

## 2019-06-03 PROCEDURE — 80048 BASIC METABOLIC PNL TOTAL CA: CPT

## 2019-06-03 PROCEDURE — 85025 COMPLETE CBC W/AUTO DIFF WBC: CPT

## 2019-06-04 NOTE — PROGRESS NOTES
Patient pre-assessment complete for [facility] poss with Dr Vinod Horne scheduled for 19 at 11am, arrival time 9am. Patient verified using . Patient instructed to bring all home medications in labeled bottles on the day of procedure. NPO status reinforced. Patient informed to take a full dose aspirin 325mg  or 81 mg x 4 on the day of procedure. Instructed they can take all other medications excluding vitamins & supplements. Patient verbalizes understanding of all instructions & denies any questions at this time.

## 2019-06-05 ENCOUNTER — HOSPITAL ENCOUNTER (OUTPATIENT)
Dept: CARDIAC CATH/INVASIVE PROCEDURES | Age: 84
Discharge: HOME | End: 2019-06-06
Attending: INTERNAL MEDICINE | Admitting: INTERNAL MEDICINE
Payer: MEDICARE

## 2019-06-05 PROBLEM — R07.9 CHEST PAIN: Status: ACTIVE | Noted: 2019-06-05

## 2019-06-05 LAB — GLUCOSE BLD STRIP.AUTO-MCNC: 99 MG/DL (ref 65–100)

## 2019-06-05 PROCEDURE — 77030029997 HC DEV COM RDL R BND TELE -B

## 2019-06-05 PROCEDURE — C1725 CATH, TRANSLUMIN NON-LASER: HCPCS

## 2019-06-05 PROCEDURE — 99153 MOD SED SAME PHYS/QHP EA: CPT

## 2019-06-05 PROCEDURE — 74011250637 HC RX REV CODE- 250/637: Performed by: INTERNAL MEDICINE

## 2019-06-05 PROCEDURE — 74011250636 HC RX REV CODE- 250/636

## 2019-06-05 PROCEDURE — 99152 MOD SED SAME PHYS/QHP 5/>YRS: CPT

## 2019-06-05 PROCEDURE — 92928 PRQ TCAT PLMT NTRAC ST 1 LES: CPT

## 2019-06-05 PROCEDURE — C1887 CATHETER, GUIDING: HCPCS

## 2019-06-05 PROCEDURE — 93458 L HRT ARTERY/VENTRICLE ANGIO: CPT

## 2019-06-05 PROCEDURE — 77030003394 HC NDL ART COOK -A

## 2019-06-05 PROCEDURE — 82962 GLUCOSE BLOOD TEST: CPT

## 2019-06-05 PROCEDURE — 74011000250 HC RX REV CODE- 250: Performed by: INTERNAL MEDICINE

## 2019-06-05 PROCEDURE — 77030004534 HC CATH ANGI DX INFN CARD -A

## 2019-06-05 PROCEDURE — C1874 STENT, COATED/COV W/DEL SYS: HCPCS

## 2019-06-05 PROCEDURE — C1769 GUIDE WIRE: HCPCS

## 2019-06-05 PROCEDURE — 74011250636 HC RX REV CODE- 250/636: Performed by: INTERNAL MEDICINE

## 2019-06-05 PROCEDURE — 77030012468 HC VLV BLEEDBK CNTRL ABBT -B

## 2019-06-05 PROCEDURE — 74011636320 HC RX REV CODE- 636/320: Performed by: INTERNAL MEDICINE

## 2019-06-05 PROCEDURE — C1894 INTRO/SHEATH, NON-LASER: HCPCS

## 2019-06-05 RX ORDER — AMLODIPINE BESYLATE 10 MG/1
10 TABLET ORAL
Status: DISCONTINUED | OUTPATIENT
Start: 2019-06-06 | End: 2019-06-06 | Stop reason: HOSPADM

## 2019-06-05 RX ORDER — HEPARIN SODIUM 200 [USP'U]/100ML
2 INJECTION, SOLUTION INTRAVENOUS CONTINUOUS
Status: DISCONTINUED | OUTPATIENT
Start: 2019-06-05 | End: 2019-06-05 | Stop reason: HOSPADM

## 2019-06-05 RX ORDER — MIDAZOLAM HYDROCHLORIDE 1 MG/ML
.5-2 INJECTION, SOLUTION INTRAMUSCULAR; INTRAVENOUS
Status: DISCONTINUED | OUTPATIENT
Start: 2019-06-05 | End: 2019-06-05 | Stop reason: HOSPADM

## 2019-06-05 RX ORDER — LIDOCAINE HYDROCHLORIDE 10 MG/ML
3-10 INJECTION INFILTRATION; PERINEURAL
Status: DISCONTINUED | OUTPATIENT
Start: 2019-06-05 | End: 2019-06-05 | Stop reason: HOSPADM

## 2019-06-05 RX ORDER — GUAIFENESIN 100 MG/5ML
81-324 LIQUID (ML) ORAL
Status: DISCONTINUED | OUTPATIENT
Start: 2019-06-05 | End: 2019-06-05

## 2019-06-05 RX ORDER — HEPARIN SODIUM 10000 [USP'U]/ML
6500 INJECTION, SOLUTION INTRAVENOUS; SUBCUTANEOUS ONCE
Status: COMPLETED | OUTPATIENT
Start: 2019-06-05 | End: 2019-06-05

## 2019-06-05 RX ORDER — ASPIRIN 81 MG/1
81 TABLET ORAL DAILY
Status: DISCONTINUED | OUTPATIENT
Start: 2019-06-06 | End: 2019-06-06 | Stop reason: HOSPADM

## 2019-06-05 RX ORDER — SODIUM CHLORIDE 9 MG/ML
75 INJECTION, SOLUTION INTRAVENOUS CONTINUOUS
Status: DISPENSED | OUTPATIENT
Start: 2019-06-05 | End: 2019-06-05

## 2019-06-05 RX ORDER — DIAZEPAM 5 MG/1
5 TABLET ORAL ONCE
Status: DISCONTINUED | OUTPATIENT
Start: 2019-06-05 | End: 2019-06-05 | Stop reason: HOSPADM

## 2019-06-05 RX ORDER — SODIUM CHLORIDE 9 MG/ML
75 INJECTION, SOLUTION INTRAVENOUS CONTINUOUS
Status: DISCONTINUED | OUTPATIENT
Start: 2019-06-05 | End: 2019-06-05

## 2019-06-05 RX ADMIN — LIDOCAINE HYDROCHLORIDE 3 ML: 10 INJECTION, SOLUTION INFILTRATION; PERINEURAL at 12:53

## 2019-06-05 RX ADMIN — MIDAZOLAM HYDROCHLORIDE 2 MG: 1 INJECTION, SOLUTION INTRAMUSCULAR; INTRAVENOUS at 12:51

## 2019-06-05 RX ADMIN — SODIUM CHLORIDE 75 ML/HR: 900 INJECTION, SOLUTION INTRAVENOUS at 10:09

## 2019-06-05 RX ADMIN — IOPAMIDOL 120 ML: 755 INJECTION, SOLUTION INTRAVENOUS at 13:24

## 2019-06-05 RX ADMIN — TICAGRELOR 180 MG: 90 TABLET ORAL at 13:04

## 2019-06-05 RX ADMIN — HEPARIN SODIUM 2 ML: 10000 INJECTION INTRAVENOUS; SUBCUTANEOUS at 12:53

## 2019-06-05 RX ADMIN — HEPARIN SODIUM 6500 UNITS: 10000 INJECTION INTRAVENOUS; SUBCUTANEOUS at 13:02

## 2019-06-05 RX ADMIN — HEPARIN SODIUM 2 UNITS/HR: 5000 INJECTION, SOLUTION INTRAVENOUS; SUBCUTANEOUS at 12:34

## 2019-06-05 NOTE — PROGRESS NOTES
TRANSFER - OUT REPORT:    [facility] Dr Aurelia Duffy  RRA  Stent x 1 RCA  Versed 2 mg  Brilinta 180 mg  Heparin 8500 units including radial cocktail  TR band 10 ml @ 1325  Pt is a/o denies complaints  NO bleeding/hematoma    Verbal report given to Hope(name) on Andrea Mace  being transferred to [facility](unit) for routine progression of care       Report consisted of patients Situation, Background, Assessment and   Recommendations(SBAR). Information from the following report(s) SBAR and Procedure Summary was reviewed with the receiving nurse. Lines:   Peripheral IV 06/05/19 Left Antecubital (Active)        Opportunity for questions and clarification was provided.

## 2019-06-05 NOTE — PROGRESS NOTES
Verbal bedside report given to Álvaro Pradhan oncoming RN. Patient's situation, background, assessment and recommendations provided. Opportunity for questions provided. Oncoming RN assumed care of patient. Right wrist observed and TR band removed. Dressing in place.

## 2019-06-05 NOTE — PROCEDURES
Brief Cardiac Procedure Note    Patient: Torrie Miller MRN: [id number]  SSN: [id number]    YOB: 1935  Age: 80 y.o. Sex: female      Date of Procedure: 6/5/2019     Pre-procedure Diagnosis: Atypical Angina    Post-procedure Diagnosis: Coronary Artery Disease    Reason for Procedure: Suspected CAD    Procedure: Left Heart Catheterization with Percutaneous Coronary Intervention    Brief Description of Procedure: rra    Performed By: Herman Avalos MD     Assistants:     Anesthesia: Moderate Sedation    Estimated Blood Loss: Less than 10 mL      Specimens: None    Implants: None    Findings: ef ok  ladok  lcx ok  rca 80% distal  Pci: 0% rca 3.5x 18 cesar  + heparin  + Brilinta    Complications: None    Recommendations: Continue medical therapy.     Signed By: Herman Avalos MD     June 5, 2019

## 2019-06-05 NOTE — PROGRESS NOTES
TRANSFER - OUT REPORT: 
 
Verbal report given to [facility] on Dorothy Lank  being transferred to [facility] for routine post - op Report consisted of patients Situation, Background, Assessment and  
Recommendations(SBAR). Information from the following report(s) {SBAR REPORTS IGTZ:[id number]} was reviewed with the receiving nurse. Lines:  
Peripheral IV 06/05/19 Left Antecubital (Active) Site Assessment Clean, dry, & intact; Clean;Dry 6/5/2019  3:10 PM  
Phlebitis Assessment 0 6/5/2019  3:10 PM  
Infiltration Assessment 0 6/5/2019  3:10 PM  
Dressing Status Clean, dry, & intact; Clean;Dry 6/5/2019  3:10 PM  
Dressing Type Tape;Transparent 6/5/2019  3:10 PM  
Hub Color/Line Status Patent; Infusing;Pink 6/5/2019  3:10 PM  
  
 
Opportunity for questions and clarification was provided. Patient transported with: 
 {TRANSPORTDETAILS:[id number]}

## 2019-06-05 NOTE — PROGRESS NOTES
Report received from [facility] Lab RN. Procedural findings communicated. Intra procedural  medication administration reviewed. Progression of care discussed.      Patient received into [address] post sheath removal.     Access site without bleeding or swelling yes    Dressing dry and intact yes    Patient instructed to limit movement to right upper extremity    Routine post procedural vital signs and site assessment initiated yes

## 2019-06-05 NOTE — PROGRESS NOTES
Patient received to [address] [place] [address] room # 9  [facility] Patient scheduled for [facility] today with Dr Arnold Torres. Procedure reviewed & questions answered, voiced good understanding consent obtained & placed on chart. All medications and medical history reviewed. Will prep patient per orders. Patient & family updated on plan of care. The patient has a fraility score of 4-VULNERABLE, based on requires assistive device for ambulation. Increased symptoms with exertion.

## 2019-06-05 NOTE — PROGRESS NOTES
TRANSFER - IN REPORT:    Verbal report received from Lear Severe , RN on Mio Raj  being received from [address] [place] [address] for routine progression of care. Report consisted of patients Situation, Background, Assessment and   Recommendations(SBAR). Information from the following reports was reviewed: Kardex, Procedure Summary, MAR and Recent Results. Opportunity for questions and clarification was provided. Assessment completed upon patients arrival to unit and care assumed. Patient received to room 316 and assessment completed. Patient connected to telemetry monitor and eagle with BP cycling every 15 minutes. Patient oriented to room and plan of care reviewed. Patient voiced understanding of keeping wrist immobilized. right radial site benign, dressing dry and intact, no hematoma; R band in place. Patient provided with clear liquids. Patient aware to use call light to communicate needs. Instructed patient to not use arm for any pushing or pulling. Admission skin assessment completed with second RN and reveals the following: Pt arrived on the unit with TR band on right wrist.  Skin is otherwise unremarkable.

## 2019-06-05 NOTE — PROGRESS NOTES
Assisted up to bathroom. No complaints, tolerated well. Right wrist site remains soft with no bleeding noted.

## 2019-06-05 NOTE — DISCHARGE INSTRUCTIONS
Percutaneous Coronary Intervention: What to Expect at [facility]    Percutaneous coronary intervention (PCI) is the name for procedures that are used to open a narrowed or blocked coronary artery. The two most common PCI procedures are coronary angioplasty and coronary stent placement. Your groin or arm may have a bruise and feel sore for a day or two after a percutaneous coronary intervention (PCI). You can do light activities around the house, but nothing strenuous for several days. This care sheet gives you a general idea about how long it will take for you to recover. But each person recovers at a different pace. Follow the steps below to get better as quickly as possible. How can you care for yourself at home? Activity    · If the doctor gave you a sedative:  ? For 24 hours, don't do anything that requires attention to detail. It takes time for the medicine's effects to completely wear off.  ? For your safety, do not drive or operate any machinery that could be dangerous. Wait until the medicine wears off and you can think clearly and react easily.     · Do not do strenuous exercise and do not lift, pull, or push anything heavy until your doctor says it is okay. This may be for a day or two. You can walk around the house and do light activity, such as cooking.     · If the catheter was placed in your groin, try not to walk up stairs for the first couple of days.     · If the catheter was placed in your arm near your wrist, do not bend your wrist deeply for the first couple of days. Be careful using your hand to get into and out of a chair or bed.     · Carry your stent identification card with you at all times.     · If your doctor recommends it, get more exercise. Walking is a good choice. Bit by bit, increase the amount you walk every day. Try for at least 30 minutes on most days of the week. Diet    · Drink plenty of fluids to help your body flush out the dye.  If you have kidney, heart, or liver disease and have to limit fluids, talk with your doctor before you increase the amount of fluids you drink.     · Keep eating a heart-healthy diet that has lots of fruits, vegetables, and whole grains. If you have not been eating this way, talk to your doctor. You also may want to talk to a dietitian. This expert can help you to learn about healthy foods and plan meals. Medicines    · Your doctor will tell you if and when you can restart your medicines. He or she will also give you instructions about taking any new medicines.     · If you take blood thinners, such as warfarin (Coumadin), clopidogrel (Plavix), or aspirin, be sure to talk to your doctor. He or she will tell you if and when to start taking those medicines again. Make sure that you understand exactly what your doctor wants you to do.     · Your doctor will prescribe blood-thinning medicines. You will likely take aspirin plus another antiplatelet, such as clopidogrel (Plavix). It is very important that you take these medicines exactly as directed. These medicines help keep the coronary artery open and reduce your risk of a heart attack.     · Call your doctor if you think you are having a problem with your medicine.    Care of the catheter site    · For 1 or 2 days, keep a bandage over the spot where the catheter was inserted. The bandage probably will fall off in this time.     · Put ice or a cold pack on the area for 10 to 20 minutes at a time to help with soreness or swelling. Put a thin cloth between the ice and your skin.     · You may shower 24 to 48 hours after the procedure, if your doctor okays it. Pat the incision dry.     · Do not soak the catheter site until it is healed. Don't take a bath for 1 week, or until your doctor tells you it is okay.     · If you are bleeding, lie down and press on the area for 15 minutes to try to make it stop. If the bleeding does not stop, call your doctor or seek immediate medical care.    Follow-up care is a key part of your treatment and safety. Be sure to make and go to all appointments, and call your doctor if you are having problems. It's also a good idea to know your test results and keep a list of the medicines you take. When should you call for help? Call 911 anytime you think you may need emergency care. For example, call if:    · You passed out (lost consciousness).     · You have severe trouble breathing.     · You have sudden chest pain and shortness of breath, or you cough up blood.     · You have symptoms of a heart attack, such as:  ? Chest pain or pressure. ? Sweating. ? Shortness of breath. ? Nausea or vomiting. ? Pain that spreads from the chest to the neck, jaw, or one or both shoulders or arms. ? Dizziness or lightheadedness. ? A fast or uneven pulse. After calling 911, chew 1 adult-strength aspirin. Wait for an ambulance. Do not try to drive yourself.     · You have been diagnosed with angina, and you have angina symptoms that do not go away with rest or are not getting better within 5 minutes after you take one dose of nitroglycerin.    Call your doctor now or seek immediate medical care if:    · You are bleeding from the area where the catheter was put in your artery.     · You have a fast-growing, painful lump at the catheter site.     · You have signs of infection, such as:  ? Increased pain, swelling, warmth, or redness. ? Red streaks leading from the catheter site. ? Pus draining from the catheter site. ? A fever.     · Your leg or arm looks blue or feels cold, numb, or tingly.    Watch closely for changes in your health, and be sure to contact your doctor if you have any problems. Where can you learn more? Go to http://moisés-barbara.info/. Enter P287 in the search box to learn more about \"Percutaneous Coronary Intervention: What to Expect at Home. \"  Current as of: July 22, 2018  Content Version: 11.9  © 1421-0325 [employer], [facility].  Care instructions adapted under license by [employer] (which disclaims liability or warranty for this information). If you have questions about a medical condition or this instruction, always ask your healthcare professional. [address] any warranty or liability for your use of this information. Cardiac Catheterization/Angiography Discharge Instructions    *Check the puncture site frequently for swelling or bleeding. If you see any bleeding, lie down and apply pressure over the area with a clean town or washcloth. Notify your doctor for any redness, swelling, drainage or oozing from the puncture site. Notify your doctor for any fever or chills. *If the leg or arm with the puncture becomes cold, numb or painful, call [facility] Cardiology at  [phone]. *Activity should be limited for the next 48 hours. Climb stairs as little as possible and avoid any stooping, bending or strenuous activity for 48 hours. No heavy lifting (anything over 10 pounds) for three days. *Do not drive for 48 hours. *You may resume your usual diet. Drink more fluids than usual.    *Have a responsible person drive you home and stay with you for at least 24 hours after your heart catheterization/angiography. *You may remove the bandage from your Right Arm in 24 hours. You may shower in 24 hours. No tub baths, hot tubs or swimming for one week. Do not place any lotions, creams, powders, ointments over the puncture site for one week. You may place a clean band-aid over the puncture site each day for 5 days. Change this daily. Heart-Healthy Diet: Care Instructions  Your Care Instructions    A heart-healthy diet has lots of vegetables, fruits, nuts, beans, and whole grains, and is low in salt. It limits foods that are high in saturated fat, such as meats, cheeses, and fried foods.  It may be hard to change your diet, but even small changes can lower your risk of heart attack and heart disease. Follow-up care is a key part of your treatment and safety. Be sure to make and go to all appointments, and call your doctor if you are having problems. It's also a good idea to know your test results and keep a list of the medicines you take. How can you care for yourself at home? Watch your portions  · Learn what a serving is. A \"serving\" and a \"portion\" are not always the same thing. Make sure that you are not eating larger portions than are recommended. For example, a serving of pasta is ½ cup. A serving size of meat is 2 to 3 ounces. A 3-ounce serving is about the size of a deck of cards. Measure serving sizes until you are good at [place]" them. Keep in mind that restaurants often serve portions that are 2 or 3 times the size of one serving. · To keep your energy level up and keep you from feeling hungry, eat often but in smaller portions. · Eat only the number of calories you need to stay at a healthy weight. If you need to lose weight, eat fewer calories than your body burns (through exercise and other physical activity). Eat more fruits and vegetables  · Eat a variety of fruit and vegetables every day. Dark green, deep orange, red, or yellow fruits and vegetables are especially good for you. Examples include spinach, carrots, peaches, and berries. · Keep carrots, celery, and other veggies handy for snacks. Buy fruit that is in season and store it where you can see it so that you will be tempted to eat it. · Cook dishes that have a lot of veggies in them, such as stir-fries and soups. Limit saturated and trans fat  · Read food labels, and try to avoid saturated and trans fats. They increase your risk of heart disease. Trans fat is found in many processed foods such as cookies and crackers. · Use olive or canola oil when you cook. Try cholesterol-lowering spreads, such as Benecol or Take Control. · Bake, broil, grill, or steam foods instead of frying them.   · Choose lean meats instead of high-fat meats such as hot dogs and sausages. Cut off all visible fat when you prepare meat. · Eat fish, skinless poultry, and meat alternatives such as soy products instead of high-fat meats. Soy products, such as tofu, may be especially good for your heart. · Choose low-fat or fat-free milk and dairy products. Eat fish  · Eat at least two servings of fish a week. Certain fish, such as salmon and tuna, contain omega-3 fatty acids, which may help reduce your risk of heart attack. Eat foods high in fiber  · Eat a variety of grain products every day. Include whole-grain foods that have lots of fiber and nutrients. Examples of whole-grain foods include oats, whole wheat bread, and brown rice. · Buy whole-grain breads and cereals, instead of white bread or pastries. Limit salt and sodium  · Limit how much salt and sodium you eat to help lower your blood pressure. · Taste food before you salt it. Add only a little salt when you think you need it. With time, your taste buds will adjust to less salt. · Eat fewer snack items, fast foods, and other high-salt, processed foods. Check food labels for the amount of sodium in packaged foods. · Choose low-sodium versions of canned goods (such as soups, vegetables, and beans). Limit sugar  · Limit drinks and foods with added sugar. These include candy, desserts, and soda pop. Limit alcohol  · Limit alcohol to no more than 2 drinks a day for men and 1 drink a day for women. Too much alcohol can cause health problems. When should you call for help? Watch closely for changes in your health, and be sure to contact your doctor if:    · You would like help planning heart-healthy meals. Where can you learn more? Go to http://moisés-barbara.info/. Enter V137 in the search box to learn more about \"Heart-Healthy Diet: Care Instructions. \"  Current as of: July 22, 2018  Content Version: 11.9  © 5915-1912 [employer].  Care instructions adapted under license by [employer] (which disclaims liability or warranty for this information). If you have questions about a medical condition or this instruction, always ask your healthcare professional. [address] any warranty or liability for your use of this information. DISCHARGE SUMMARY from Nurse    PATIENT INSTRUCTIONS:    After general anesthesia or intravenous sedation, for 24 hours or while taking prescription Narcotics:  · Limit your activities  · Do not drive and operate hazardous machinery  · Do not make important personal or business decisions  · Do  not drink alcoholic beverages  · If you have not urinated within 8 hours after discharge, please contact your surgeon on call. Report the following to your surgeon:  · Excessive pain, swelling, redness or odor of or around the surgical area  · Temperature over 100.5  · Nausea and vomiting lasting longer than 4 hours or if unable to take medications  · Any signs of decreased circulation or nerve impairment to extremity: change in color, persistent  numbness, tingling, coldness or increase pain  · Any questions    What to do at Home:    *  Please give a list of your current medications to your Primary Care Provider. *  Please update this list whenever your medications are discontinued, doses are      changed, or new medications (including over-the-counter products) are added. *  Please carry medication information at all times in case of emergency situations. These are general instructions for a healthy lifestyle:    No smoking/ No tobacco products/ Avoid exposure to second hand smoke  Surgeon General's Warning:  Quitting smoking now greatly reduces serious risk to your health.     Obesity, smoking, and sedentary lifestyle greatly increases your risk for illness    A healthy diet, regular physical exercise & weight monitoring are important for maintaining a healthy lifestyle    You may be retaining fluid if you have a history of heart failure or if you experience any of the following symptoms:  Weight gain of 3 pounds or more overnight or 5 pounds in a week, increased swelling in our hands or feet or shortness of breath while lying flat in bed. Please call your doctor as soon as you notice any of these symptoms; do not wait until your next office visit. Recognize signs and symptoms of STROKE:    F-face looks uneven    A-arms unable to move or move unevenly    S-speech slurred or non-existent    T-time-call 911 as soon as signs and symptoms begin-DO NOT go       Back to bed or wait to see if you get better-TIME IS BRAIN. Warning Signs of HEART ATTACK     Call 911 if you have these symptoms:   Chest discomfort. Most heart attacks involve discomfort in the center of the chest that lasts more than a few minutes, or that goes away and comes back. It can feel like uncomfortable pressure, squeezing, fullness, or pain.  Discomfort in other areas of the upper body. Symptoms can include pain or discomfort in one or both arms, the back, neck, jaw, or stomach.  Shortness of breath with or without chest discomfort.  Other signs may include breaking out in a cold sweat, nausea, or lightheadedness. Don't wait more than five minutes to call 911 - MINUTES MATTER! Fast action can save your life. Calling 911 is almost always the fastest way to get lifesaving treatment. Emergency Medical Services staff can begin treatment when they arrive -- up to an hour sooner than if someone gets to the hospital by car. The discharge information has been reviewed with the patient. The patient verbalized understanding. Discharge medications reviewed with the patient and appropriate educational materials and side effects teaching were provided.   ___________________________________________________________________________________________________________________________________

## 2019-06-05 NOTE — PROGRESS NOTES
Problem: Cath Lab Procedures: Post-Cath Day of Procedure (Initiate SCIP Measures for Post-Op Care)  Goal: Off Pathway (Use only if patient is Off Pathway)  Outcome: Progressing Towards Goal  Goal: Activity/Safety  Outcome: Progressing Towards Goal  Goal: Consults, if ordered  Outcome: Progressing Towards Goal  Goal: Diagnostic Test/Procedures  Outcome: Progressing Towards Goal  Goal: Nutrition/Diet  Outcome: Progressing Towards Goal  Goal: Discharge Planning  Outcome: Progressing Towards Goal  Goal: Medications  Outcome: Progressing Towards Goal  Goal: Respiratory  Outcome: Progressing Towards Goal  Goal: Treatments/Interventions/Procedures  Outcome: Progressing Towards Goal  Goal: Psychosocial  Outcome: Progressing Towards Goal  Goal: *Procedure site is without bleeding and signs of infection six hours post sheath removal  Outcome: Progressing Towards Goal  Goal: *Hemodynamically stable  Outcome: Progressing Towards Goal  Goal: *Optimal pain control at patient's stated goal  Outcome: Progressing Towards Goal

## 2019-06-06 VITALS
SYSTOLIC BLOOD PRESSURE: 129 MMHG | HEIGHT: 60 IN | OXYGEN SATURATION: 100 % | DIASTOLIC BLOOD PRESSURE: 78 MMHG | TEMPERATURE: 97.5 F | WEIGHT: 134.8 LBS | HEART RATE: 65 BPM | RESPIRATION RATE: 18 BRPM | BODY MASS INDEX: 26.46 KG/M2

## 2019-06-06 LAB
ANION GAP SERPL CALC-SCNC: 7 MMOL/L (ref 7–16)
BUN SERPL-MCNC: 16 MG/DL (ref 8–23)
CALCIUM SERPL-MCNC: 9.1 MG/DL (ref 8.3–10.4)
CHLORIDE SERPL-SCNC: 107 MMOL/L (ref 98–107)
CO2 SERPL-SCNC: 26 MMOL/L (ref 21–32)
CREAT SERPL-MCNC: 0.73 MG/DL (ref 0.6–1)
GLUCOSE SERPL-MCNC: 84 MG/DL (ref 65–100)
POTASSIUM SERPL-SCNC: 3.6 MMOL/L (ref 3.5–5.1)
SODIUM SERPL-SCNC: 140 MMOL/L (ref 136–145)

## 2019-06-06 PROCEDURE — 74011250637 HC RX REV CODE- 250/637: Performed by: INTERNAL MEDICINE

## 2019-06-06 PROCEDURE — 36415 COLL VENOUS BLD VENIPUNCTURE: CPT

## 2019-06-06 PROCEDURE — 80048 BASIC METABOLIC PNL TOTAL CA: CPT

## 2019-06-06 RX ADMIN — TICAGRELOR 90 MG: 90 TABLET ORAL at 05:11

## 2019-06-06 NOTE — PROGRESS NOTES
Verbal bedside report received from Alfonso Islas RN. Assumed care of patient. TR Band removed by offgoing nurse and dressing applied. Some oozing before removing, but stopped with pressure. Will continue to monitor.

## 2019-06-06 NOTE — DISCHARGE SUMMARY
[address] [place] [address] Cardiology Discharge Summary     Patient ID:  Andrea Mace  [id number]  50 y.o.  1935    Admit date: 6/5/2019    Discharge date:  6/6/2019    Admitting Physician: Los Melendez MD     Discharge Physician: Dr. Abhi Brandon    Admission Diagnoses: Abnormal EKG [R94.31]  Chest pain [R07.9]    Discharge Diagnoses:    Diagnosis    Chest pain    HTN (hypertension)    Coronary atherosclerosis of native coronary artery       Cardiology Procedures this admission:  Left heart catheterization with PCI  Consults: None    Hospital Course: Patient was seen at the office of [address] [place] [address] Cardiology by Dr. Aurelia Duffy for complaints of chest pain and was subsequently scheduled for an AM Admission [facility] on 6/5/19. Patient underwent cardiac catheterization by Dr. Aurelia Duffy. Patient was found to have 80% stenosis of the dRCA that was stented with a 3.5 x 18-mm Xience Cris NIMISHA with 0% residual stenosis. Patient tolerated the procedure well and was taken to the telemetry floor for recovery. The morning of discharge, patient was up feeling well without any complaints of chest pain or shortness of breath. Patient's right radial cath site was clean, dry and intact without hematoma or bruit. Patient's labs were WNL. Patient was seen and examined by Dr. Abhi Brandon and determined stable and ready for discharge. Patient was instructed on the importance of medication compliance including taking aspirin and Brilinta everyday without missing a dose. After receiving drug eluting stents, the patient will remain on dual anti-platelet therapy for 1 year. No BB due to bradycardia. No statin therapy due to intolerance to all statins. The patient will follow up with [address] [place] [address] Cardiology -- Dr. Aruelia Duffy on 6/27/19 at 10:00am in the [place] office. Patient has been referred to cardiac rehab. DISPOSITION: The patient is being discharged home in stable condition on a low saturated fat, low cholesterol and low salt diet.  The patient is instructed to advance activities as tolerated to the limit of fatigue or shortness of breath. The patient is instructed to avoid all heavy lifting for 3-5 days. The patient is instructed to watch the cath site for bleeding/oozing; if seen, the patient is instructed to apply firm pressure with a clean cloth and call [facility] Cardiology at [phone]. The patient is instructed to watch for signs of infection which include: increasing area of redness, fever/hot to touch or purulent drainage at the catheterization site. The patient is instructed not to soak in a bathtub for 7-10 days, but is cleared to shower. The patient is instructed to call the office or return to the ER for immediate evaluation for any shortness of breath or chest pain not relieved by NTG. Discharge Exam: Patient has been seen by Dr. Susana Goldman: see his progress note for exam details. Visit Vitals  /59 (BP 1 Location: Right arm, BP Patient Position: At rest)   Pulse 66   Temp 98.3 °F (36.8 °C)   Resp 22   Ht 5' (1.524 m)   Wt 63.5 kg (140 lb)   SpO2 98%   Breastfeeding? No   BMI 27.34 kg/m²       Recent Results (from the past 24 hour(s))   GLUCOSE, POC    Collection Time: 06/05/19 11:09 AM   Result Value Ref Range    Glucose (POC) 99 65 - 100 mg/dL         Patient Instructions:     Current Discharge Medication List      START taking these medications    Details   ticagrelor (BRILINTA) 90 mg tablet Take 1 Tab by mouth two (2) times a day. Qty: 180 Tab, Refills: 3         CONTINUE these medications which have NOT CHANGED    Details   cholecalciferol (VITAMIN D3) 1,000 unit cap Take 1,000 Units by mouth daily. NORVASC 10 mg tablet Take 1 Tab by mouth every morning. Brand medically necessary! Qty: 90 Tab, Refills: 3    Associated Diagnoses: Essential hypertension      ascorbic acid, vitamin C, (VITAMIN C) 1,000 mg tablet Take 100 mg by mouth daily. aspirin 81 mg tablet Take 81 mg by mouth every morning.  Patient has cardiac stent x 1 polyethylene glycol (MIRALAX) 17 gram/dose powder Take 17 g by mouth nightly. Qty: 510 g, Refills: 11    Associated Diagnoses: Chronic constipation      nitroglycerin (NITROSTAT) 0.4 mg SL tablet 1 Tab by SubLINGual route every five (5) minutes as needed for Chest Pain (If no relief after 2 tabs take the third tab and call EMS).   Qty: 1 Bottle, Refills: 1    Associated Diagnoses: Ischemic chest pain

## 2019-06-06 NOTE — PROGRESS NOTES
Cardiac Rehab: Spoke with patient regarding referral to cardiac rehab. Patient meets admission criteria based on PCI (06/05/19). Written information about Cardiac Rehab given and reviewed with patient. Discussed lifestyle modifications to promote cardiac wellness. Patient indicated that she may want to participate in the cardiac rehab program and will stop by our program to tour our facility before deciding. Pt and her caregiver have our contact information and states they will call to schedule orientation if she decides she attend. Her Cardiologist is Dr. Victor Hugo Shields.       Thank you,  ENOC FrancoisN, RN  Cardiopulmonary Rehabilitation Nurse Liaison  [facility]

## 2019-06-06 NOTE — PROGRESS NOTES
Discharge instructions reviewed with Pt and her daughter. Prescriptions given for Brilinta and med info sheets provided for all new medications. Opportunity for questions provided. Pt and her daughter voiced understanding of all discharge instructions. Many questions were answered. Pt has bruise on under side of left elbow secondary to leaking removed IV. Seen by David Jackman.   Pt advised to observe and if swelling or hardness increased to call [facility] Cardiology

## 2019-06-06 NOTE — PROCEDURES
[facility]  CARDIAC CATH    Name:  Kathe Olivo  MR#:  [id number]  :  1935  ACCOUNT #:  [de-identified]  DATE OF SERVICE:  2019    PROCEDURES PERFORMED:  1. Left heart catheterization with left ventriculography and coronary angiography. 2.  PCI distal right coronary artery. PREOPERATIVE DIAGNOSIS:  Coronary artery disease with unstable angina. POSTOPERATIVE DIAGNOSIS:  Coronary artery disease with unstable angina. SURGEON:  Erendira Wilson MD    ASSISTANT:  None. ESTIMATED BLOOD LOSS:  Zero. SPECIMENS REMOVED:  None. COMPLICATIONS:  None. IMPLANTS:  Intracoronary stent. ANESTHESIA:  Conscious sedation. HISTORY:  This is an 17-year-old lady with a history of coronary artery disease. She has had recent problems with worsening chest pain somewhat atypical for angina. A nuclear scan, however, showed marked ischemia. A cardiac catheterization with possible angioplasty is recommended. PROCEDURE AND FINDINGS:  Via the right radial artery, a 6-Persian multipurpose was advanced to the ascending aorta and used to inject the right coronary artery. This reveals diffuse calcification. There is diffuse atherosclerosis. There is a high-grade critical 80-90% distal stenosis. The right coronary artery distal to this area of disease is large with diffuse disease and moderate narrowing. The same catheter was then used to inject the left coronary. The left main is normal.  Divides into LAD and left circumflex. The LAD is calcified. There is diffuse atherosclerosis. The major diagonal branch has a stent that is patent. The middle and distal LAD segments have minor disease. The left circumflex has mild disease in proximal segment. It gives rise to a relatively small first marginal branch which has moderate proximal disease. The catheter was then placed within the left ventricle. Left trigger function was normal.  The ejection fraction was 65%.   Left ventricular end-diastolic pressure was 10 mmHg, and there was no gradient on pullback across the aortic valve. PCI was embarked upon. She was anticoagulated with heparin 100 units per kg IV. She was loaded with Brilinta 180 mg p.o. Via a 5-Tuvaluan 3.5 XB and over a 180 Runthrough guidewire, the stenosis was predilated with a 2.5 x 15 balloon and then stented with a 3.5 x 18 Cris stent postdilated in the midportion high pressure 3.5 mm. Repeat angio showed an excellent result. The case was stopped at this point. She tolerated it well. IMPRESSION:  1. Normal left ventricular function. 2.  Diffuse coronary artery disease as described. On the left, there is an LAD diagonal stent which is patent. There is a small marginal branch with moderate disease. On the right, it is a big vessel. There is a lot of calcium. There is diffuse plaque, but in the distal segment, there is a high-grade preocclusive stenosis which is suspected to be the culprit lesion. 3.  Successful percutaneous coronary intervention of the distal right coronary artery was performed as described above. A good result was obtained after deployment of a 3.5 x 18 mm Cris drug-eluting stent.       Tobi Chavez MD      [id number]  D:  06/05/2019 13:40  T:  06/05/2019 13:47  JOB #:  [id number]

## 2019-06-06 NOTE — PROGRESS NOTES
Verbal bedside report given to ramos Alba RN. Patient's situation, background, assessment and recommendations provided. Opportunity for questions provided. Oncoming RN assumed care of patient. R Radial site visualized.

## 2019-06-06 NOTE — PROGRESS NOTES
Care Management Interventions  PCP Verified by CM: Yes(had an appointment this week and will change it for next week as in the hospital)  Palliative Care Criteria Met (RRAT>21 & CHF Dx)?: No(RRAT 10 Dx Chest pain)  Mode of Transport at Discharge: Other (see comment)(family)  Transition of Care Consult (CM Consult): Discharge Planning  Discharge Durable Medical Equipment: No(cane, walker)  Physical Therapy Consult: No  Occupational Therapy Consult: No  Speech Therapy Consult: No  Current Support Network: Lives Alone  Confirm Follow Up Transport: Self(daughter can provide transportation if needed)  Plan discussed with Pt/Family/Caregiver: Yes  Freedom of Choice Offered: Yes  Discharge Location  Discharge Placement: Home  Met with patient for d/c planning. Patient alert and oriented x 3, independent of ADL's and lives alone in one story home. DME includes cane and walker. She is able to drive and her daughter can assist as needed. She has [employer] Medicare and is able to obtain her medications without difficulty at [facility] in [place]. Patient to d/c home. States her daughter can assist as needed.

## 2019-06-06 NOTE — PROGRESS NOTES
Problem: Cath Lab Procedures: Post-Cath Day 1  Goal: Off Pathway (Use only if patient is Off Pathway)  Outcome: Progressing Towards Goal  Goal: Diagnostic Test/Procedures  Outcome: Progressing Towards Goal  Goal: Discharge Planning  Outcome: Progressing Towards Goal  Goal: Medications  Outcome: Progressing Towards Goal  Goal: Respiratory  Outcome: Progressing Towards Goal  Goal: Treatments/Interventions/Procedures  Outcome: Progressing Towards Goal  Goal: Psychosocial  Outcome: Progressing Towards Goal

## 2019-07-18 ENCOUNTER — HOME HEALTH ADMISSION (OUTPATIENT)
Dept: HOME HEALTH SERVICES | Facility: HOME HEALTH | Age: 84
End: 2019-07-18
Payer: MEDICARE

## 2019-07-18 ENCOUNTER — HOSPITAL ENCOUNTER (EMERGENCY)
Age: 84
Discharge: HOME | End: 2019-07-18
Attending: EMERGENCY MEDICINE
Payer: MEDICARE

## 2019-07-18 ENCOUNTER — APPOINTMENT (OUTPATIENT)
Dept: GENERAL RADIOLOGY | Age: 84
End: 2019-07-18
Attending: EMERGENCY MEDICINE
Payer: MEDICARE

## 2019-07-18 VITALS
SYSTOLIC BLOOD PRESSURE: 155 MMHG | DIASTOLIC BLOOD PRESSURE: 81 MMHG | HEIGHT: 60 IN | BODY MASS INDEX: 26.9 KG/M2 | WEIGHT: 137 LBS | TEMPERATURE: 97.9 F | OXYGEN SATURATION: 97 % | RESPIRATION RATE: 18 BRPM | HEART RATE: 70 BPM

## 2019-07-18 DIAGNOSIS — R58 ECCHYMOSIS: Primary | ICD-10-CM

## 2019-07-18 DIAGNOSIS — R07.9 CHEST PAIN, UNSPECIFIED TYPE: ICD-10-CM

## 2019-07-18 LAB
ALBUMIN SERPL-MCNC: 3.6 G/DL (ref 3.2–4.6)
ALBUMIN/GLOB SERPL: 0.9 {RATIO} (ref 1.2–3.5)
ALP SERPL-CCNC: 118 U/L (ref 50–136)
ALT SERPL-CCNC: 31 U/L (ref 12–65)
ANION GAP SERPL CALC-SCNC: 9 MMOL/L (ref 7–16)
APTT PPP: 31.6 SEC (ref 24.7–39.8)
AST SERPL-CCNC: 24 U/L (ref 15–37)
ATRIAL RATE: 55 BPM
ATRIAL RATE: 66 BPM
BASOPHILS # BLD: 0 K/UL (ref 0–0.2)
BASOPHILS NFR BLD: 0 % (ref 0–2)
BILIRUB SERPL-MCNC: 0.5 MG/DL (ref 0.2–1.1)
BUN SERPL-MCNC: 13 MG/DL (ref 8–23)
CALCIUM SERPL-MCNC: 9.1 MG/DL (ref 8.3–10.4)
CALCULATED P AXIS, ECG09: 59 DEGREES
CALCULATED P AXIS, ECG09: 75 DEGREES
CALCULATED R AXIS, ECG10: 66 DEGREES
CALCULATED R AXIS, ECG10: 72 DEGREES
CALCULATED T AXIS, ECG11: 48 DEGREES
CALCULATED T AXIS, ECG11: 55 DEGREES
CHLORIDE SERPL-SCNC: 106 MMOL/L (ref 98–107)
CK SERPL-CCNC: 138 U/L (ref 21–215)
CO2 SERPL-SCNC: 25 MMOL/L (ref 21–32)
CREAT SERPL-MCNC: 0.82 MG/DL (ref 0.6–1)
DIAGNOSIS, 93000: NORMAL
DIAGNOSIS, 93000: NORMAL
DIFFERENTIAL METHOD BLD: ABNORMAL
EOSINOPHIL # BLD: 0 K/UL (ref 0–0.8)
EOSINOPHIL NFR BLD: 1 % (ref 0.5–7.8)
ERYTHROCYTE [DISTWIDTH] IN BLOOD BY AUTOMATED COUNT: 13.4 % (ref 11.9–14.6)
GLOBULIN SER CALC-MCNC: 3.8 G/DL (ref 2.3–3.5)
GLUCOSE SERPL-MCNC: 87 MG/DL (ref 65–100)
HCT VFR BLD AUTO: 39.1 % (ref 35.8–46.3)
HGB BLD-MCNC: 12.6 G/DL (ref 11.7–15.4)
IMM GRANULOCYTES # BLD AUTO: 0 K/UL (ref 0–0.5)
IMM GRANULOCYTES NFR BLD AUTO: 0 % (ref 0–5)
INR PPP: 1
LYMPHOCYTES # BLD: 1.4 K/UL (ref 0.5–4.6)
LYMPHOCYTES NFR BLD: 30 % (ref 13–44)
MCH RBC QN AUTO: 31.2 PG (ref 26.1–32.9)
MCHC RBC AUTO-ENTMCNC: 32.2 G/DL (ref 31.4–35)
MCV RBC AUTO: 96.8 FL (ref 79.6–97.8)
MONOCYTES # BLD: 0.4 K/UL (ref 0.1–1.3)
MONOCYTES NFR BLD: 8 % (ref 4–12)
NEUTS SEG # BLD: 2.8 K/UL (ref 1.7–8.2)
NEUTS SEG NFR BLD: 61 % (ref 43–78)
NRBC # BLD: 0 K/UL (ref 0–0.2)
P-R INTERVAL, ECG05: 158 MS
P-R INTERVAL, ECG05: 168 MS
PLATELET # BLD AUTO: 278 K/UL (ref 150–450)
PMV BLD AUTO: 10.4 FL (ref 9.4–12.3)
POTASSIUM SERPL-SCNC: 4.1 MMOL/L (ref 3.5–5.1)
PROT SERPL-MCNC: 7.4 G/DL (ref 6.3–8.2)
PROTHROMBIN TIME: 12.9 SEC (ref 11.7–14.5)
Q-T INTERVAL, ECG07: 388 MS
Q-T INTERVAL, ECG07: 434 MS
QRS DURATION, ECG06: 68 MS
QRS DURATION, ECG06: 76 MS
QTC CALCULATION (BEZET), ECG08: 406 MS
QTC CALCULATION (BEZET), ECG08: 415 MS
RBC # BLD AUTO: 4.04 M/UL (ref 4.05–5.2)
SODIUM SERPL-SCNC: 140 MMOL/L (ref 136–145)
TROPONIN I BLD-MCNC: 0 NG/ML (ref 0.02–0.05)
TROPONIN I SERPL-MCNC: <0.02 NG/ML (ref 0.02–0.05)
VENTRICULAR RATE, ECG03: 55 BPM
VENTRICULAR RATE, ECG03: 66 BPM
WBC # BLD AUTO: 4.6 K/UL (ref 4.3–11.1)

## 2019-07-18 PROCEDURE — 85025 COMPLETE CBC W/AUTO DIFF WBC: CPT

## 2019-07-18 PROCEDURE — 85610 PROTHROMBIN TIME: CPT

## 2019-07-18 PROCEDURE — 80053 COMPREHEN METABOLIC PANEL: CPT

## 2019-07-18 PROCEDURE — 93005 ELECTROCARDIOGRAM TRACING: CPT | Performed by: EMERGENCY MEDICINE

## 2019-07-18 PROCEDURE — 82550 ASSAY OF CK (CPK): CPT

## 2019-07-18 PROCEDURE — 84484 ASSAY OF TROPONIN QUANT: CPT

## 2019-07-18 PROCEDURE — 99285 EMERGENCY DEPT VISIT HI MDM: CPT | Performed by: EMERGENCY MEDICINE

## 2019-07-18 PROCEDURE — 71046 X-RAY EXAM CHEST 2 VIEWS: CPT

## 2019-07-18 PROCEDURE — 85730 THROMBOPLASTIN TIME PARTIAL: CPT

## 2019-07-18 NOTE — ED TRIAGE NOTES
Pt had heart stent placed 1 month ago. States she began having right neck and shoulder pain intermittently but on Monday evening she began having left neck pain. States she found a blood clot in her left chest this week. States she is getting tired quickly and having \"lumps of blood all over body because of that medication they put me on\" (brilinta). States she is trying to have diarrhea but her bowels are \"locked down completely\". Sees Dr Margy Montelongo with [facility] cardiology.

## 2019-07-18 NOTE — ED PROVIDER NOTES
60-year-old female with history of hypertension, CAD status post stent placement ×2 ( was recently had stent placed in June 2019) presents with complaint of intermittent right/left sided chest discomfort and neck discomfort Since having her stent placed. Denies shortness of breath, fever, chills, diaphoresis, dizziness, cough. States that she has been compliant with all of her medications including Brilinta. States that she's had increased generalized weakness and fatigue. Patient states she's also noticed increased bruising all over her body since starting that medication. Denies abdominal pain, melena, hematochezia, nausea, vomiting    The history is provided by the patient. No  was used. Chest Pain (Angina)    This is a recurrent problem. The current episode started more than 2 days ago. The problem has not changed since onset. The problem occurs daily. The pain is associated with rest and exertion. The pain is present in the right side and left side. The pain is at a severity of 2/10. The pain is mild. The quality of the pain is described as pressure-like. Associated symptoms include malaise/fatigue. Pertinent negatives include no abdominal pain, no back pain, no claudication, no cough, no diaphoresis, no dizziness, no fever, no headaches, no hemoptysis, no irregular heartbeat, no leg pain, no lower extremity edema, no nausea, no near-syncope, no numbness, no orthopnea, no palpitations, no PND, no shortness of breath, no sputum production, no vomiting and no weakness. She has tried nothing for the symptoms. The treatment provided no relief. Risk factors include hypertension, cardiac disease and dyslipidemia. Procedural history includes cardiac stents.        Past Medical History:   Diagnosis Date    Adverse reaction to statin medication 4/14/2015    Chronic constipation     [facility]agulation disorder ([facility]     Colon cancer ([facility] 1992    in situ     Coronary atherosclerosis of native coronary artery 6/11/2013    stent placement    GERD (gastroesophageal reflux disease)     Grief reaction with prolonged bereavement     Hypertension     controlled with med    Menopause     Mild diastolic dysfunction 25/38/6705    Per ECHO    Mitral regurgitation 02/18/2009    Mild to Moderate Per ECHO    Nerve pain     right side head since 8-13-13-pt fell and hit head    OA (osteoarthritis) 6/11/2013    Psychiatric disorder     PUD (peptic ulcer disease) 1980's       Past Surgical History:   Procedure Laterality Date    CARDIAC SURG PROCEDURE UNLIST      COLONOSCOPY N/A 3/10/2017    COLONOSCOPY  BMI 28 performed by Manuel Lee MD at [facility]  04/2012    HX HEMORRHOIDECTOMY  1970    HX HIP REPLACEMENT Right 11/13/2014    HX ORTHOPAEDIC Left     Hammer Toe Repair/Reconstruction    HX TUBAL LIGATION           Family History:   Problem Relation Age of Onset    Stroke Father 80    Breast Cancer Sister     Cancer Sister     Cancer Sister     Hypertension Mother     Cancer Mother         Colon    Breast Cancer Sister     Alzheimer Brother     Diabetes Sister     Dementia Brother     Seizures Brother     Alcohol abuse Brother     Alcohol abuse Brother     Coronary Artery Disease Brother     Arthritis-osteo Brother     Seizures Brother     No Known Problems Maternal Grandmother     Breast Cancer Daughter        Social History     Socioeconomic History    Marital status:      Spouse name: Not on file    Number of children: Not on file    Years of education: Not on file    Highest education level: Not on file   Occupational History    Not on file   Social Needs    Financial resource strain: Not on file    Food insecurity:     Worry: Not on file     Inability: Not on file    Transportation needs:     Medical: Not on file     Non-medical: Not on file   Tobacco Use    Smoking status: Never Smoker    Smokeless tobacco: Never Used Substance and Sexual Activity    Alcohol use: No    Drug use: No     Types: OTC, Prescription    Sexual activity: Never   Lifestyle    Physical activity:     Days per week: Not on file     Minutes per session: Not on file    Stress: Not on file   Relationships    Social connections:     Talks on phone: Not on file     Gets together: Not on file     Attends Anabaptist service: Not on file     Active member of club or organization: Not on file     Attends meetings of clubs or organizations: Not on file     Relationship status: Not on file    Intimate partner violence:     Fear of current or ex partner: Not on file     Emotionally abused: Not on file     Physically abused: Not on file     Forced sexual activity: Not on file   Other Topics Concern    Not on file   Social History Narrative    Not on file         ALLERGIES: Penicillins; Plavix [clopidogrel]; Aspirin; Lipitor [atorvastatin]; Lyrica [pregabalin]; Polyethylene glycol ointment; Soy; Tramadol; and Voltaren [diclofenac sodium]    Review of Systems   Constitutional: Positive for malaise/fatigue. Negative for diaphoresis and fever. HENT: Negative for congestion and sinus pain. Respiratory: Negative for cough, hemoptysis, sputum production and shortness of breath. Cardiovascular: Positive for chest pain. Negative for palpitations, orthopnea, claudication, PND and near-syncope. Gastrointestinal: Negative for abdominal pain, blood in stool, diarrhea, nausea and vomiting. Genitourinary: Negative for dysuria and flank pain. Musculoskeletal: Negative for back pain and myalgias. Neurological: Negative for dizziness, weakness, numbness and headaches. Vitals:    07/18/19 1157   BP: 109/60   Pulse: 70   Resp: 18   Temp: 97.9 °F (36.6 °C)   SpO2: 97%   Weight: 62.1 kg (137 lb)   Height: 5' (1.524 m)            Physical Exam   Constitutional: She is oriented to person, place, and time. She appears well-developed.    Patient well-appearing and in no acute distress. HENT:   Head: Normocephalic. MMM. Eyes: Pupils are equal, round, and reactive to light. Conjunctivae and EOM are normal.   Neck: Neck supple. No JVD present. Cardiovascular: Normal rate, regular rhythm and normal heart sounds. Radial pulses 2+ and equal bilaterally. Pulmonary/Chest: Effort normal.   CTAB. No wheezes, rhonchi, or rales. No chest wall TTP. No crepitus. Abdominal: Soft. There is no tenderness. Soft, NTND. No CVAT. No rebound or guarding. Musculoskeletal: Normal range of motion. She exhibits no edema. No LE edema or calf TTP. Neurological: She is alert and oriented to person, place, and time. No cranial nerve deficit or sensory deficit. Strength 5/5 throughout. Normal sensory exam.    Skin: Skin is warm and dry. Multiple ecchymosis noted to bilateral lower extremities, bilateral upper extremities. Psychiatric: She has a normal mood and affect. Nursing note and vitals reviewed. MDM  Number of Diagnoses or Management Options  Chest pain, unspecified type: new and requires workup  Ecchymosis: new and requires workup  Diagnosis management comments: Discussed with cardiology patient. States that patient could attempt Plavix as an alternative to Delta but that bruising with still be expected. Troponin negative. EKG with no acute ST changes. Chest x-ray clear. Patient denies any symptoms at this time. Instructed the patient follow up with Cards in 24-48 hrs. Given return precautions.          Amount and/or Complexity of Data Reviewed  Clinical lab tests: ordered and reviewed  Tests in the radiology section of CPT®: ordered and reviewed  Tests in the medicine section of CPT®: ordered and reviewed  Review and summarize past medical records: yes  Independent visualization of images, tracings, or specimens: yes    Risk of Complications, Morbidity, and/or Mortality  Presenting problems: moderate  Diagnostic procedures: moderate  Management options: moderate    Patient Progress  Patient progress: stable    ED Course as of Jul 18 1409   Thu Jul 18, 2019   1337 CXR     IMPRESSION: Negative for acute abnormality. [DF]      ED Course User Index  [DF] Eliu Hernandez MD       EKG  Date/Time: 7/18/2019 12:21 PM  Performed by: Eliu Hernandez MD  Authorized by:  Eliu Hernandez MD     ECG reviewed by ED Physician in the absence of a cardiologist: yes    Rate:     ECG rate:  66    ECG rate assessment: normal    Rhythm:     Rhythm: sinus rhythm    Ectopy:     Ectopy: none    QRS:     QRS axis:  Normal    QRS intervals:  Normal  Conduction:     Conduction: normal    ST segments:     ST segments:  Normal  T waves:     T waves: normal        Results Include:    Recent Results (from the past 24 hour(s))   EKG, 12 LEAD, INITIAL    Collection Time: 07/18/19 11:51 AM   Result Value Ref Range    Ventricular Rate 66 BPM    Atrial Rate 66 BPM    P-R Interval 158 ms    QRS Duration 68 ms    Q-T Interval 388 ms    QTC Calculation (Bezet) 406 ms    Calculated P Axis 59 degrees    Calculated R Axis 66 degrees    Calculated T Axis 48 degrees    Diagnosis       Normal sinus rhythm  Possible Left atrial enlargement  Borderline ECG  When compared with ECG of 29-NOV-2014 16:43,  Nonspecific T wave abnormality no longer evident in Anterior leads     TROPONIN I    Collection Time: 07/18/19 11:59 AM   Result Value Ref Range    Troponin-I, Qt. <0.02 (L) 0.02 - 0.05 NG/ML   CBC WITH AUTOMATED DIFF    Collection Time: 07/18/19 11:59 AM   Result Value Ref Range    WBC 4.6 4.3 - 11.1 K/uL    RBC 4.04 (L) 4.05 - 5.2 M/uL    HGB 12.6 11.7 - 15.4 g/dL    HCT 39.1 35.8 - 46.3 %    MCV 96.8 79.6 - 97.8 FL    MCH 31.2 26.1 - 32.9 PG    MCHC 32.2 31.4 - 35.0 g/dL    RDW 13.4 11.9 - 14.6 %    PLATELET 768 932 - 300 K/uL    MPV 10.4 9.4 - 12.3 FL    ABSOLUTE NRBC 0.00 0.0 - 0.2 K/uL    DF AUTOMATED      NEUTROPHILS 61 43 - 78 %    LYMPHOCYTES 30 13 - 44 % MONOCYTES 8 4.0 - 12.0 %    EOSINOPHILS 1 0.5 - 7.8 %    BASOPHILS 0 0.0 - 2.0 %    IMMATURE GRANULOCYTES 0 0.0 - 5.0 %    ABS. NEUTROPHILS 2.8 1.7 - 8.2 K/UL    ABS. LYMPHOCYTES 1.4 0.5 - 4.6 K/UL    ABS. MONOCYTES 0.4 0.1 - 1.3 K/UL    ABS. EOSINOPHILS 0.0 0.0 - 0.8 K/UL    ABS. BASOPHILS 0.0 0.0 - 0.2 K/UL    ABS. IMM. GRANS. 0.0 0.0 - 0.5 K/UL   METABOLIC PANEL, COMPREHENSIVE    Collection Time: 07/18/19 11:59 AM   Result Value Ref Range    Sodium 140 136 - 145 mmol/L    Potassium 4.1 3.5 - 5.1 mmol/L    Chloride 106 98 - 107 mmol/L    CO2 25 21 - 32 mmol/L    Anion gap 9 7 - 16 mmol/L    Glucose 87 65 - 100 mg/dL    BUN 13 8 - 23 MG/DL    Creatinine 0.82 0.6 - 1.0 MG/DL    GFR est AA >60 >60 ml/min/1.73m2    GFR est non-AA >60 >60 ml/min/1.73m2    Calcium 9.1 8.3 - 10.4 MG/DL    Bilirubin, total 0.5 0.2 - 1.1 MG/DL    ALT (SGPT) 31 12 - 65 U/L    AST (SGOT) 24 15 - 37 U/L    Alk. phosphatase 118 50 - 136 U/L    Protein, total 7.4 6.3 - 8.2 g/dL    Albumin 3.6 3.2 - 4.6 g/dL    Globulin 3.8 (H) 2.3 - 3.5 g/dL    A-G Ratio 0.9 (L) 1.2 - 3.5     CK    Collection Time: 07/18/19 11:59 AM   Result Value Ref Range     21 - 215 U/L   PROTHROMBIN TIME + INR    Collection Time: 07/18/19 11:59 AM   Result Value Ref Range    Prothrombin time 12.9 11.7 - 14.5 sec    INR 1.0     PTT    Collection Time: 07/18/19 11:59 AM   Result Value Ref Range    aPTT 31.6 24.7 - 39.8 SEC   EKG, 12 LEAD, SUBSEQUENT    Collection Time: 07/18/19  2:04 PM   Result Value Ref Range    Ventricular Rate 55 BPM    Atrial Rate 55 BPM    P-R Interval 168 ms    QRS Duration 76 ms    Q-T Interval 434 ms    QTC Calculation (Bezet) 415 ms    Calculated P Axis 75 degrees    Calculated R Axis 72 degrees    Calculated T Axis 55 degrees    Diagnosis       !! AGE AND GENDER SPECIFIC ECG ANALYSIS !!   Sinus bradycardia  Otherwise normal ECG  When compared with ECG of 18-JUL-2019 11:51,  No significant change was found         Xander Dillon MD; 7/18/2019 @12:22 PM Voice dictation software was used during the making of this note. This software is not perfect and grammatical and other typographical errors may be present.   This note has not been proofread for errors.  ===================================================================

## 2019-07-18 NOTE — PROGRESS NOTES
Spoke with daughter and patient in the ER. Daughter inquiring about home health services. MD in agreement. Verified information. Agreeable to [facility]. Order and  already in place.

## 2019-07-18 NOTE — PROGRESS NOTES
[address]  Face to Face Encounter    Patients Name: Laura Rangel    YOB: 1935    Ordering Physician: Dr. Jamal Phillips    Primary Diagnosis: weakness and fatigue    Date of Face to Face:   7/18/2019                                  Face to Face Encounter findings are related to primary reason for home care:   yes. 1. I certify that the patient needs intermittent care as follows: skilled nursing care:  skilled observation/assessment, patient education  physical therapy: strengthening, stretching/ROM, transfer training and gait/stair training  occupational therapy:  ADL safety (ie. cooking, bathing, dressing), ROM and pt/caregiver education    2. I certify that this patient is homebound, that is: 1) patient requires the use of a cane device, special transportation, or assistance of another to leave the home; or 2) patient's condition makes leaving the home medically contraindicated; and 3) patient has a normal inability to leave the home and leaving the home requires considerable and taxing effort. Patient may leave the home for infrequent and short duration for medical reasons, and occasional absences for non-medical reasons. Homebound status is due to the following functional limitations: Patient with strength deficits limiting the performance of all ADL's without caregiver assistance or the use of an assistive device. Patient with poor safety awareness and is at risk for falls without assistance of another person and the use of an assistive device. Patient with poor ambulation endurance limiting their safe ability to ascend/descend the required number of steps to leave the home. 3. I certify that this patient is under my care and that I, or a nurse practitioner or [facility], or clinical nurse specialist, or certified nurse midwife, working with me, had a Face-to-Face Encounter that meets the physician Face-to-Face Encounter requirements.   The following are the clinical findings from the [address] [place] [address] encounter that support the need for skilled services and is a summary of the encounter:     See attached progess note      Trey Ledezma RN  7/18/2019      THE FOLLOWING TO BE COMPLETED BY THE COMMUNITY PHYSICIAN:    I concur with the findings described above from the F2F encounter that this patient is homebound and in need of a skilled service.     Certifying Physician: _____________________________________      Printed Certifying Physician Name: _____________________________________    Date: _________________

## 2019-07-18 NOTE — DISCHARGE INSTRUCTIONS
Patient Education        Chest Pain: Care Instructions  Your Care Instructions    There are many things that can cause chest pain. Some are not serious and will get better on their own in a few days. But some kinds of chest pain need more testing and treatment. Your doctor may have recommended a follow-up visit in the next 8 to 12 hours. If you are not getting better, you may need more tests or treatment. Even though your doctor has released you, you still need to watch for any problems. The doctor carefully checked you, but sometimes problems can develop later. If you have new symptoms or if your symptoms do not get better, get medical care right away. If you have worse or different chest pain or pressure that lasts more than 5 minutes or you passed out (lost consciousness), call 911 or seek other emergency help right away. A medical visit is only one step in your treatment. Even if you feel better, you still need to do what your doctor recommends, such as going to all suggested follow-up appointments and taking medicines exactly as directed. This will help you recover and help prevent future problems. How can you care for yourself at home? · Rest until you feel better. · Take your medicine exactly as prescribed. Call your doctor if you think you are having a problem with your medicine. · Do not drive after taking a prescription pain medicine. When should you call for help? Call 911 if:    · You passed out (lost consciousness).     · You have severe difficulty breathing.     · You have symptoms of a heart attack. These may include:  ? Chest pain or pressure, or a strange feeling in your chest.  ? Sweating. ? Shortness of breath. ? Nausea or vomiting. ? Pain, pressure, or a strange feeling in your back, neck, jaw, or upper belly or in one or both shoulders or arms. ? Lightheadedness or sudden weakness. ? A fast or irregular heartbeat.   After you call 911, the  may tell you to chew 1 adult-strength or 2 to 4 low-dose aspirin. Wait for an ambulance. Do not try to drive yourself.    Call your doctor today if:    · You have any trouble breathing.     · Your chest pain gets worse.     · You are dizzy or lightheaded, or you feel like you may faint.     · You are not getting better as expected.     · You are having new or different chest pain. Where can you learn more? Go to http://moisés-barbara.info/. Enter A120 in the search box to learn more about \"Chest Pain: Care Instructions. \"  Current as of: September 23, 2018  Content Version: 11.9  © 0675-7332 [employer]. Care instructions adapted under license by [employer] (which disclaims liability or warranty for this information). If you have questions about a medical condition or this instruction, always ask your healthcare professional. [place] [address] any warranty or liability for your use of this information. Bruises: Care Instructions  Your Care Instructions    Bruises occur when small blood vessels under the skin tear or rupture, most often from a twist, bump, or fall. Blood leaks into tissues under the skin and causes a black-and-blue spot that often turns colors, including purplish black, reddish blue, or yellowish green, as the bruise heals. Bruises hurt, but most are not serious and will go away on their own within 2 to 4 weeks. Sometimes, gravity causes them to spread down the body. A leg bruise usually will take longer to heal than a bruise on the face or arms. Follow-up care is a key part of your treatment and safety. Be sure to make and go to all appointments, and call your doctor if you are having problems. It's also a good idea to know your test results and keep a list of the medicines you take. How can you care for yourself at home? · Take pain medicines exactly as directed. ? If the doctor gave you a prescription medicine for pain, take it as prescribed. ?  If you are not taking a prescription pain medicine, ask your doctor if you can take an over-the-counter medicine. · Put ice or a cold pack on the area for 10 to 20 minutes at a time. Put a thin cloth between the ice and your skin. · If you can, prop up the bruised area on pillows as much as possible for the next few days. Try to keep the bruise above the level of your heart. When should you call for help? Call your doctor now or seek immediate medical care if:    · You have signs of infection, such as:  ? Increased pain, swelling, warmth, or redness. ? Red streaks leading from the bruise. ? Pus draining from the bruise. ? A fever.     · You have a bruise on your leg and signs of a blood clot, such as:  ? Increasing redness and swelling along with warmth, tenderness, and pain in the bruised area. ? Pain in your calf, back of the knee, thigh, or groin. ? Redness and swelling in your leg or groin.     · Your pain gets worse.    Watch closely for changes in your health, and be sure to contact your doctor if:    · You do not get better as expected. Where can you learn more? Go to http://moisés-barbara.info/. Enter [phone] in the search box to learn more about \"Bruises: Care Instructions. \"  Current as of: September 23, 2018  Content Version: 11.9  © 4140-7200 [employer]. Care instructions adapted under license by [employer] (which disclaims liability or warranty for this information).  If you have questions about a medical condition or this instruction, always ask your healthcare professional. [place] [address] any warranty or liability for your use of this information.

## 2019-07-18 NOTE — ED NOTES
I have reviewed discharge instructions with the patient. The patient verbalized understanding. Patient left ED via Discharge Method: ambulatory to Home with daughter. Opportunity for questions and clarification provided. Patient given 0 scripts. To continue your aftercare when you leave the hospital, you may receive an automated call from our care team to check in on how you are doing. This is a free service and part of our promise to provide the best care and service to meet your aftercare needs.  If you have questions, or wish to unsubscribe from this service please call [phone]. Thank you for Choosing our [facility] Emergency Department.

## 2019-07-21 ENCOUNTER — HOME CARE VISIT (OUTPATIENT)
Dept: SCHEDULING | Facility: HOME HEALTH | Age: 84
End: 2019-07-21
Payer: MEDICARE

## 2019-07-21 PROCEDURE — 3331090002 HH PPS REVENUE DEBIT

## 2019-07-21 PROCEDURE — 400013 HH SOC

## 2019-07-21 PROCEDURE — 3331090001 HH PPS REVENUE CREDIT

## 2019-07-21 PROCEDURE — G0299 HHS/HOSPICE OF RN EA 15 MIN: HCPCS

## 2019-07-22 PROCEDURE — 3331090002 HH PPS REVENUE DEBIT

## 2019-07-22 PROCEDURE — 3331090001 HH PPS REVENUE CREDIT

## 2019-07-23 ENCOUNTER — HOME CARE VISIT (OUTPATIENT)
Dept: SCHEDULING | Facility: HOME HEALTH | Age: 84
End: 2019-07-23
Payer: MEDICARE

## 2019-07-23 VITALS
DIASTOLIC BLOOD PRESSURE: 84 MMHG | SYSTOLIC BLOOD PRESSURE: 132 MMHG | RESPIRATION RATE: 20 BRPM | TEMPERATURE: 98.7 F | HEART RATE: 78 BPM

## 2019-07-23 VITALS
DIASTOLIC BLOOD PRESSURE: 68 MMHG | RESPIRATION RATE: 16 BRPM | HEART RATE: 80 BPM | TEMPERATURE: 97.6 F | SYSTOLIC BLOOD PRESSURE: 134 MMHG

## 2019-07-23 VITALS
RESPIRATION RATE: 20 BRPM | HEART RATE: 78 BPM | OXYGEN SATURATION: 97 % | SYSTOLIC BLOOD PRESSURE: 132 MMHG | DIASTOLIC BLOOD PRESSURE: 84 MMHG | TEMPERATURE: 98.7 F

## 2019-07-23 PROCEDURE — 3331090002 HH PPS REVENUE DEBIT

## 2019-07-23 PROCEDURE — G0152 HHCP-SERV OF OT,EA 15 MIN: HCPCS

## 2019-07-23 PROCEDURE — 3331090001 HH PPS REVENUE CREDIT

## 2019-07-23 PROCEDURE — G0156 HHCP-SVS OF AIDE,EA 15 MIN: HCPCS

## 2019-07-23 PROCEDURE — G0151 HHCP-SERV OF PT,EA 15 MIN: HCPCS

## 2019-07-24 PROCEDURE — 3331090001 HH PPS REVENUE CREDIT

## 2019-07-24 PROCEDURE — 3331090002 HH PPS REVENUE DEBIT

## 2019-07-25 ENCOUNTER — HOME CARE VISIT (OUTPATIENT)
Dept: SCHEDULING | Facility: HOME HEALTH | Age: 84
End: 2019-07-25
Payer: MEDICARE

## 2019-07-25 VITALS
TEMPERATURE: 97.6 F | RESPIRATION RATE: 16 BRPM | SYSTOLIC BLOOD PRESSURE: 130 MMHG | DIASTOLIC BLOOD PRESSURE: 78 MMHG | HEART RATE: 80 BPM

## 2019-07-25 PROCEDURE — G0156 HHCP-SVS OF AIDE,EA 15 MIN: HCPCS

## 2019-07-25 PROCEDURE — 3331090002 HH PPS REVENUE DEBIT

## 2019-07-25 PROCEDURE — 3331090001 HH PPS REVENUE CREDIT

## 2019-07-26 ENCOUNTER — HOME CARE VISIT (OUTPATIENT)
Dept: SCHEDULING | Facility: HOME HEALTH | Age: 84
End: 2019-07-26
Payer: MEDICARE

## 2019-07-26 VITALS
HEART RATE: 76 BPM | SYSTOLIC BLOOD PRESSURE: 110 MMHG | RESPIRATION RATE: 18 BRPM | TEMPERATURE: 97.5 F | DIASTOLIC BLOOD PRESSURE: 58 MMHG

## 2019-07-26 PROCEDURE — G0299 HHS/HOSPICE OF RN EA 15 MIN: HCPCS

## 2019-07-26 PROCEDURE — G0157 HHC PT ASSISTANT EA 15: HCPCS

## 2019-07-26 PROCEDURE — 3331090001 HH PPS REVENUE CREDIT

## 2019-07-26 PROCEDURE — 3331090002 HH PPS REVENUE DEBIT

## 2019-07-27 PROCEDURE — 3331090001 HH PPS REVENUE CREDIT

## 2019-07-27 PROCEDURE — 3331090002 HH PPS REVENUE DEBIT

## 2019-07-28 VITALS
RESPIRATION RATE: 18 BRPM | TEMPERATURE: 97.9 F | OXYGEN SATURATION: 98 % | SYSTOLIC BLOOD PRESSURE: 130 MMHG | DIASTOLIC BLOOD PRESSURE: 74 MMHG | HEART RATE: 88 BPM

## 2019-07-28 PROCEDURE — 3331090002 HH PPS REVENUE DEBIT

## 2019-07-28 PROCEDURE — 3331090001 HH PPS REVENUE CREDIT

## 2019-07-29 PROCEDURE — 3331090001 HH PPS REVENUE CREDIT

## 2019-07-29 PROCEDURE — 3331090002 HH PPS REVENUE DEBIT

## 2019-07-30 ENCOUNTER — HOME CARE VISIT (OUTPATIENT)
Dept: SCHEDULING | Facility: HOME HEALTH | Age: 84
End: 2019-07-30
Payer: MEDICARE

## 2019-07-30 VITALS
OXYGEN SATURATION: 99 % | RESPIRATION RATE: 18 BRPM | DIASTOLIC BLOOD PRESSURE: 68 MMHG | HEART RATE: 76 BPM | SYSTOLIC BLOOD PRESSURE: 122 MMHG | TEMPERATURE: 97.7 F

## 2019-07-30 VITALS
DIASTOLIC BLOOD PRESSURE: 72 MMHG | TEMPERATURE: 97 F | SYSTOLIC BLOOD PRESSURE: 118 MMHG | HEART RATE: 78 BPM | RESPIRATION RATE: 16 BRPM

## 2019-07-30 VITALS
DIASTOLIC BLOOD PRESSURE: 68 MMHG | TEMPERATURE: 97.7 F | HEART RATE: 74 BPM | SYSTOLIC BLOOD PRESSURE: 122 MMHG | RESPIRATION RATE: 18 BRPM

## 2019-07-30 PROCEDURE — 3331090001 HH PPS REVENUE CREDIT

## 2019-07-30 PROCEDURE — G0156 HHCP-SVS OF AIDE,EA 15 MIN: HCPCS

## 2019-07-30 PROCEDURE — G0299 HHS/HOSPICE OF RN EA 15 MIN: HCPCS

## 2019-07-30 PROCEDURE — 3331090002 HH PPS REVENUE DEBIT

## 2019-07-30 PROCEDURE — G0157 HHC PT ASSISTANT EA 15: HCPCS

## 2019-07-31 PROCEDURE — 3331090001 HH PPS REVENUE CREDIT

## 2019-07-31 PROCEDURE — 3331090002 HH PPS REVENUE DEBIT

## 2019-08-01 PROCEDURE — 3331090002 HH PPS REVENUE DEBIT

## 2019-08-01 PROCEDURE — 3331090001 HH PPS REVENUE CREDIT

## 2019-08-02 ENCOUNTER — HOME CARE VISIT (OUTPATIENT)
Dept: HOME HEALTH SERVICES | Facility: HOME HEALTH | Age: 84
End: 2019-08-02
Payer: MEDICARE

## 2019-08-02 ENCOUNTER — HOME CARE VISIT (OUTPATIENT)
Dept: SCHEDULING | Facility: HOME HEALTH | Age: 84
End: 2019-08-02
Payer: MEDICARE

## 2019-08-02 VITALS
DIASTOLIC BLOOD PRESSURE: 78 MMHG | HEART RATE: 81 BPM | RESPIRATION RATE: 17 BRPM | TEMPERATURE: 98 F | SYSTOLIC BLOOD PRESSURE: 130 MMHG

## 2019-08-02 VITALS
SYSTOLIC BLOOD PRESSURE: 122 MMHG | HEART RATE: 72 BPM | TEMPERATURE: 97.6 F | DIASTOLIC BLOOD PRESSURE: 64 MMHG | RESPIRATION RATE: 18 BRPM

## 2019-08-02 PROCEDURE — G0156 HHCP-SVS OF AIDE,EA 15 MIN: HCPCS

## 2019-08-02 PROCEDURE — 3331090002 HH PPS REVENUE DEBIT

## 2019-08-02 PROCEDURE — 3331090001 HH PPS REVENUE CREDIT

## 2019-08-02 PROCEDURE — G0157 HHC PT ASSISTANT EA 15: HCPCS

## 2019-08-03 PROCEDURE — 3331090001 HH PPS REVENUE CREDIT

## 2019-08-03 PROCEDURE — 3331090002 HH PPS REVENUE DEBIT

## 2019-08-04 PROCEDURE — 3331090002 HH PPS REVENUE DEBIT

## 2019-08-04 PROCEDURE — 3331090001 HH PPS REVENUE CREDIT

## 2019-08-05 PROCEDURE — 3331090001 HH PPS REVENUE CREDIT

## 2019-08-05 PROCEDURE — 3331090002 HH PPS REVENUE DEBIT

## 2019-08-06 ENCOUNTER — HOME CARE VISIT (OUTPATIENT)
Dept: HOME HEALTH SERVICES | Facility: HOME HEALTH | Age: 84
End: 2019-08-06
Payer: MEDICARE

## 2019-08-06 ENCOUNTER — HOME CARE VISIT (OUTPATIENT)
Dept: SCHEDULING | Facility: HOME HEALTH | Age: 84
End: 2019-08-06
Payer: MEDICARE

## 2019-08-06 VITALS
SYSTOLIC BLOOD PRESSURE: 110 MMHG | DIASTOLIC BLOOD PRESSURE: 60 MMHG | HEART RATE: 76 BPM | TEMPERATURE: 98 F | RESPIRATION RATE: 16 BRPM

## 2019-08-06 VITALS
DIASTOLIC BLOOD PRESSURE: 60 MMHG | SYSTOLIC BLOOD PRESSURE: 110 MMHG | TEMPERATURE: 98 F | HEART RATE: 76 BPM | RESPIRATION RATE: 16 BRPM

## 2019-08-06 PROCEDURE — 3331090002 HH PPS REVENUE DEBIT

## 2019-08-06 PROCEDURE — 3331090001 HH PPS REVENUE CREDIT

## 2019-08-06 PROCEDURE — G0156 HHCP-SVS OF AIDE,EA 15 MIN: HCPCS

## 2019-08-06 PROCEDURE — G0151 HHCP-SERV OF PT,EA 15 MIN: HCPCS

## 2019-08-07 PROCEDURE — 3331090002 HH PPS REVENUE DEBIT

## 2019-08-07 PROCEDURE — 3331090001 HH PPS REVENUE CREDIT

## 2019-08-08 ENCOUNTER — HOME CARE VISIT (OUTPATIENT)
Dept: SCHEDULING | Facility: HOME HEALTH | Age: 84
End: 2019-08-08
Payer: MEDICARE

## 2019-08-08 VITALS
HEART RATE: 71 BPM | SYSTOLIC BLOOD PRESSURE: 118 MMHG | RESPIRATION RATE: 16 BRPM | DIASTOLIC BLOOD PRESSURE: 66 MMHG | TEMPERATURE: 98.2 F

## 2019-08-08 PROCEDURE — G0156 HHCP-SVS OF AIDE,EA 15 MIN: HCPCS

## 2019-08-08 PROCEDURE — 3331090001 HH PPS REVENUE CREDIT

## 2019-08-08 PROCEDURE — G0151 HHCP-SERV OF PT,EA 15 MIN: HCPCS

## 2019-08-08 PROCEDURE — 3331090002 HH PPS REVENUE DEBIT

## 2019-08-09 VITALS
HEART RATE: 71 BPM | RESPIRATION RATE: 16 BRPM | DIASTOLIC BLOOD PRESSURE: 66 MMHG | TEMPERATURE: 98.2 F | SYSTOLIC BLOOD PRESSURE: 118 MMHG

## 2020-02-17 ENCOUNTER — APPOINTMENT (OUTPATIENT)
Dept: CT IMAGING | Age: 85
End: 2020-02-17
Attending: EMERGENCY MEDICINE
Payer: MEDICARE

## 2020-02-17 ENCOUNTER — HOSPITAL ENCOUNTER (EMERGENCY)
Age: 85
Discharge: HOME | End: 2020-02-17
Attending: EMERGENCY MEDICINE
Payer: MEDICARE

## 2020-02-17 ENCOUNTER — APPOINTMENT (OUTPATIENT)
Dept: GENERAL RADIOLOGY | Age: 85
End: 2020-02-17
Attending: EMERGENCY MEDICINE
Payer: MEDICARE

## 2020-02-17 VITALS
WEIGHT: 130 LBS | TEMPERATURE: 97.5 F | SYSTOLIC BLOOD PRESSURE: 181 MMHG | RESPIRATION RATE: 16 BRPM | HEART RATE: 65 BPM | DIASTOLIC BLOOD PRESSURE: 89 MMHG | OXYGEN SATURATION: 98 % | HEIGHT: 60 IN | BODY MASS INDEX: 25.52 KG/M2

## 2020-02-17 DIAGNOSIS — M25.511 PAIN IN JOINT OF RIGHT SHOULDER: ICD-10-CM

## 2020-02-17 DIAGNOSIS — M54.2 NECK PAIN: Primary | ICD-10-CM

## 2020-02-17 LAB
ANION GAP SERPL CALC-SCNC: 9 MMOL/L (ref 7–16)
BUN SERPL-MCNC: 17 MG/DL (ref 8–23)
CALCIUM SERPL-MCNC: 9.3 MG/DL (ref 8.3–10.4)
CHLORIDE SERPL-SCNC: 108 MMOL/L (ref 98–107)
CO2 SERPL-SCNC: 25 MMOL/L (ref 21–32)
CREAT SERPL-MCNC: 0.94 MG/DL (ref 0.6–1)
ERYTHROCYTE [DISTWIDTH] IN BLOOD BY AUTOMATED COUNT: 13.3 % (ref 11.9–14.6)
GLUCOSE SERPL-MCNC: 117 MG/DL (ref 65–100)
HCT VFR BLD AUTO: 40.7 % (ref 35.8–46.3)
HGB BLD-MCNC: 13.2 G/DL (ref 11.7–15.4)
MAGNESIUM SERPL-MCNC: 2.2 MG/DL (ref 1.8–2.4)
MCH RBC QN AUTO: 31.5 PG (ref 26.1–32.9)
MCHC RBC AUTO-ENTMCNC: 32.4 G/DL (ref 31.4–35)
MCV RBC AUTO: 97.1 FL (ref 79.6–97.8)
NRBC # BLD: 0 K/UL (ref 0–0.2)
PLATELET # BLD AUTO: 222 K/UL (ref 150–450)
PMV BLD AUTO: 10.9 FL (ref 9.4–12.3)
POTASSIUM SERPL-SCNC: 4 MMOL/L (ref 3.5–5.1)
RBC # BLD AUTO: 4.19 M/UL (ref 4.05–5.2)
SODIUM SERPL-SCNC: 142 MMOL/L (ref 136–145)
WBC # BLD AUTO: 3.9 K/UL (ref 4.3–11.1)

## 2020-02-17 PROCEDURE — 80048 BASIC METABOLIC PNL TOTAL CA: CPT

## 2020-02-17 PROCEDURE — 70450 CT HEAD/BRAIN W/O DYE: CPT

## 2020-02-17 PROCEDURE — 85027 COMPLETE CBC AUTOMATED: CPT

## 2020-02-17 PROCEDURE — 99283 EMERGENCY DEPT VISIT LOW MDM: CPT

## 2020-02-17 PROCEDURE — 83735 ASSAY OF MAGNESIUM: CPT

## 2020-02-17 PROCEDURE — 73030 X-RAY EXAM OF SHOULDER: CPT

## 2020-02-17 NOTE — DISCHARGE INSTRUCTIONS
Follow-up with therapy center for your appointment. Follow-up with your primary care provider for additional evaluation as needed. Return if any emergency.

## 2020-02-17 NOTE — ED NOTES
I have reviewed discharge instructions with the patient. The patient verbalized understanding. Patient left ED via Discharge Method: wheelchair to Home with (family    Opportunity for questions and clarification provided. Patient given 0 scripts. To continue your aftercare when you leave the hospital, you may receive an automated call from our care team to check in on how you are doing. This is a free service and part of our promise to provide the best care and service to meet your aftercare needs.  If you have questions, or wish to unsubscribe from this service please call [phone]. Thank you for Choosing our [facility] Emergency Department.

## 2020-02-17 NOTE — ED PROVIDER NOTES
66-year-old lady presents with concerns about 2 to 3 weeks of right shoulder pain that radiates up towards the right half of her head. She says it is a throbbing pain that will come and go. She says she has no injury and she is had no redness or swelling. She said she had similar symptoms several months ago and saw her primary care doctor in December for. At that time she was told she likely has arthritis. Additionally, she says that she has been \"wobbly\" when she is trying to walk. She says that has been present for about 3 weeks. She has not seen any other providers for that. She said she is not having any problems with her arms and has no speech or language difficulty. She denies any other symptoms including no chest pressure or tightness. No fevers or chills and no nausea, vomiting, or diarrhea. Triage plan: I was unable to perform an adequate neuro exam in triage. I will plan to get a CT scan of her head, x-ray of her shoulder, check her basic electrolytes and magnesium. I evaluated this patient in triage. I did a limited history and physical to create a quick triage plan. Patient may need further history, physical, and diagnostics pending placement in the main emergency department and evaluation by the treating emergency physician. Cheyenne Fuentes. Bolivar Pitt MD    Elements of this note were created using speech recognition software. As such, errors of speech recognition may be present.            Past Medical History:   Diagnosis Date    Chronic constipation     Colon cancer ([facility] [address] 1992    In Situ    Coronary atherosclerosis of native coronary artery 6/11/2013    stent placement    Hyperlipidemia LDL goal <70     Hypertension     controlled with med    Menopause     Mild diastolic dysfunction 82/57/3006    Per ECHO    Mitral regurgitation 02/18/2009    Mild to Moderate Per ECHO    OA (osteoarthritis) 6/11/2013    PUD (peptic ulcer disease) 1980's    Urge incontinence     Vitamin D deficiency        Past Surgical History:   Procedure Laterality Date    COLONOSCOPY N/A 3/10/2017    COLONOSCOPY  BMI 28 performed by Zayra Abel MD at [facility]  4/2012 & 6/2019    HX HEMORRHOIDECTOMY  1970    HX HIP REPLACEMENT Right 11/13/2014    HX ORTHOPAEDIC Left     Hammer Toe Repair/Reconstruction    HX TUBAL LIGATION           Family History:   Problem Relation Age of Onset    Stroke Father 80    Breast Cancer Sister     Cancer Sister     Cancer Sister     Hypertension Mother     Cancer Mother         Colon    Breast Cancer Sister     Alzheimer Brother     Cancer Brother     Diabetes Sister     Dementia Brother     Seizures Brother     Alcohol abuse Brother     Alcohol abuse Brother     Coronary Artery Disease Brother     Arthritis-osteo Brother     Seizures Brother     No Known Problems Maternal Grandmother     Breast Cancer Daughter        Social History     Socioeconomic History    Marital status:      Spouse name: Not on file    Number of children: Not on file    Years of education: Not on file    Highest education level: Not on file   Occupational History    Not on file   Social Needs    Financial resource strain: Not on file    Food insecurity:     Worry: Not on file     Inability: Not on file    Transportation needs:     Medical: Not on file     Non-medical: Not on file   Tobacco Use    Smoking status: Never Smoker    Smokeless tobacco: Never Used   Substance and Sexual Activity    Alcohol use: No    Drug use:  Yes    Sexual activity: Not on file   Lifestyle    Physical activity:     Days per week: Not on file     Minutes per session: Not on file    Stress: Not on file   Relationships    Social connections:     Talks on phone: Not on file     Gets together: Not on file     Attends Yazidism service: Not on file     Active member of club or organization: Not on file     Attends meetings of clubs or organizations: Not on file     Relationship status: Not on file    Intimate partner violence:     Fear of current or ex partner: Not on file     Emotionally abused: Not on file     Physically abused: Not on file     Forced sexual activity: Not on file   Other Topics Concern    Not on file   Social History Narrative    Not on file         ALLERGIES: Penicillins; Plavix [clopidogrel]; Aspirin; Brilinta [ticagrelor]; Lipitor [atorvastatin]; Lyrica [pregabalin]; Polyethylene glycol ointment; Soy; Tramadol; and Voltaren [diclofenac sodium]    Review of Systems   Constitutional: Negative for activity change, appetite change, fatigue and fever. HENT: Negative. Respiratory: Negative. Cardiovascular: Negative. Endocrine: Negative. Genitourinary: Negative. Musculoskeletal: Positive for neck pain and neck stiffness. Negative for back pain, gait problem and myalgias. Skin: Negative. Neurological: Negative for dizziness, seizures, facial asymmetry, speech difficulty, light-headedness, numbness and headaches. Psychiatric/Behavioral: Negative. Vitals:    02/17/20 1255   BP: 156/70   Pulse: 69   Resp: 16   Temp: 97.5 °F (36.4 °C)   SpO2: 98%            Physical Exam  Constitutional:       Appearance: Normal appearance. HENT:      Head: Normocephalic and atraumatic. Nose: Nose normal.      Mouth/Throat:      Mouth: Mucous membranes are moist.   Neck:      Musculoskeletal: Normal range of motion and neck supple. Cardiovascular:      Rate and Rhythm: Normal rate and regular rhythm. Pulses: Normal pulses. Heart sounds: Normal heart sounds. Pulmonary:      Effort: Pulmonary effort is normal. No respiratory distress. Breath sounds: Normal breath sounds. No stridor. Abdominal:      General: Abdomen is flat. Bowel sounds are normal.      Palpations: Abdomen is soft. Musculoskeletal: Normal range of motion. Skin:     General: Skin is warm and dry.       Capillary Refill: Capillary refill takes less than 2 seconds. Neurological:      General: No focal deficit present. Mental Status: She is alert and oriented to person, place, and time. Psychiatric:         Mood and Affect: Mood normal.         Behavior: Behavior normal.          MDM  Number of Diagnoses or Management Options  Neck pain:   Pain in joint of right shoulder:   Diagnosis management comments: CT head and x-ray unremarkable. She likely has arthralgia of the right shoulder causing her to decrease use of the right shoulder which cause muscle atrophy in the right more so than the left causing neck discomfort and spasm more in the right side compared to the left. No signs of any CVA, TIA. Do not suspect dissection, ACS. Spoke with case management. They have help schedule her an appointment with [facility] therapy [place] for further evaluation and treatment. She is otherwise stable for discharge at this time. I do not feel additional work-up is necessary. Xr Shoulder Rt Ap/lat Min 2 V    Result Date: 2/17/2020  Right shoulder radiographs, 3 views INDICATION: Right shoulder pain after a fall 2 weeks ago. COMPARISON: None. FINDINGS: The bones are demineralized. Severe degenerative changes the right shoulder and AC joint characterized by joint space narrowing and prominent marginal osteophytes with spurring into the subacromial space. The soft tissues are unremarkable. IMPRESSION: 1. No acute radiographic abnormality of the right shoulder. 2.  Advanced degenerative changes. Ct Head Wo Cont    Result Date: 2/17/2020  Head CT INDICATION: Left head spasm, neck pain Multiple axial images obtained through the brain without intravenous contrast. Radiation dose reduction techniques were used for this study: All CT scans performed at this facility use one or all of the following: Automated exposure control, adjustment of the mA and/or kVp according to patient's size, iterative reconstruction.  FINDINGS: No areas of abnormal attenuation are seen in the brain. There is no CT evidence of acute hemorrhage or infarction. The ventricles are normal in size. There are no extra-axial fluid collections. No masses are seen. The sinuses are clear. There are no bony lesions. IMPRESSION: No CT evidence of acute intracranial abnormality.      Recent Results (from the past 12 hour(s))  -CBC W/O DIFF  Collection Time: 02/17/20 12:58 PM       Result                      Value             Ref Range           WBC                         3.9 (L)           4.3 - 11.1 K*       RBC                         4.19              4.05 - 5.2 M*       HGB                         13.2              11.7 - 15.4 *       HCT                         40.7              35.8 - 46.3 %       MCV                         97.1              79.6 - 97.8 *       MCH                         31.5              26.1 - 32.9 *       MCHC                        32.4              31.4 - 35.0 *       RDW                         13.3              11.9 - 14.6 %       PLATELET                    222               150 - 450 K/*       MPV                         10.9              9.4 - 12.3 FL       ABSOLUTE NRBC               0.00              0.0 - 0.2 K/*  -METABOLIC PANEL, BASIC  Collection Time: 02/17/20 12:58 PM       Result                      Value             Ref Range           Sodium                      142               136 - 145 mm*       Potassium                   4.0               3.5 - 5.1 mm*       Chloride                    108 (H)           98 - 107 mmo*       CO2                         25                21 - 32 mmol*       Anion gap                   9                 7 - 16 mmol/L       Glucose                     117 (H)           65 - 100 mg/*       BUN                         17                8 - 23 MG/DL        Creatinine                  0.94              0.6 - 1.0 MG*       GFR est AA                  >60               >60 ml/min/1*       GFR est non-AA >60               >60 ml/min/1*       Calcium                     9.3               8.3 - 10.4 M*  -MAGNESIUM  Collection Time: 02/17/20 12:58 PM       Result                      Value             Ref Range           Magnesium                   2.2               1.8 - 2.4 mg*         Procedures

## 2020-02-17 NOTE — PROGRESS NOTES
Met with patient in ER per MD request to discuss option of outpatient PT. Patient agreeable, requesting [address]. Appt:  2-19-20 at 1:45pm for initial evaluation.

## 2020-02-17 NOTE — ED TRIAGE NOTES
Patient states she is having some spasms in the left side of her head and neck. Patient states she had a chest pain when checking in, but pain is now gone. States spasm in neck has been going on for 2 weeks and she has been seen for this but was told to put a heating pad on it. Patient states she also loses her balance off and on. Dr. Faisal José in to see patient in triage.

## 2020-02-19 ENCOUNTER — HOSPITAL ENCOUNTER (OUTPATIENT)
Dept: PHYSICAL THERAPY | Age: 85
Discharge: HOME | End: 2020-02-19
Payer: MEDICARE

## 2020-02-19 PROCEDURE — 97110 THERAPEUTIC EXERCISES: CPT

## 2020-02-19 PROCEDURE — 97162 PT EVAL MOD COMPLEX 30 MIN: CPT

## 2020-02-19 NOTE — THERAPY EVALUATION
Deyanira Lydia  : 1935  Primary: [employer] H[employer]  Secondary:  [address]  at [address], [address] [place] [address],[address], [address] [facility]  [phone]   [phone]          Deanna 53 Assessment 2020   ICD-10: Treatment Diagnosis: RIGHT shoulder pain M25.511     Precautions/Allergies:   Penicillins; Plavix [clopidogrel]; Aspirin; Brilinta [ticagrelor]; Lipitor [atorvastatin]; Lyrica [pregabalin]; Polyethylene glycol ointment; Soy; Tramadol; and Voltaren [diclofenac sodium]   TREATMENT PLAN:  Effective Dates: 2020 TO 2020 (60 days). Frequency/Duration: 2 times a week for 60 Day(s) MEDICAL/REFERRING DIAGNOSIS:  shoulder, right pain M25.511  DATE OF ONSET: chronic  REFERRING PHYSICIAN: Pavithra Cordero MD MD Orders: eval and treat  Return MD Appointment: Early 2020     INITIAL ASSESSMENT:  Ms. Betsey Hernández presents severe strength and ROM deficits in R UE, decreased cervical AROM with end-range pain in all planes, poor joint mobility in cervical spine and GH joint and hypertonic and tender R UT and levator scap that limits ability to reach and sleep. Pt would benefit from skilled therapy to address these deficits for return to previous level of function. PROBLEM LIST (Impacting functional limitations):  1. Decreased Strength  2. Decreased ADL/Functional Activities  3. Increased Pain  4. Decreased Flexibility/Joint Mobility  5. Decreased [place] with Home Exercise Program INTERVENTIONS PLANNED: (Treatment may consist of any combination of the following)  1. Cold  2. Home Exercise Program (HEP)  3. Manual Therapy  4. Range of Motion (ROM)  5. Therapeutic Exercise/Strengthening   GOALS: (Goals have been discussed and agreed upon with patient.)  Short-Term Functional Goals: Time Frame: 4 weeks  1. Pt will be I with HEP to allow for continued progress with symptom management.   2. Pt will improve passive flexion to 145 degrees, ER to 15 degrees and IR to 15 degrees for improved function. Discharge Goals: Time Frame: 8 weeks  1. Pt will improve qDASH score to <30 to reflect an improvement in function. 2. Pt will improve c/o pain to 3/10 to allow for improved tolerance for return to previous level of function. 3. Pt will improve RIGHT UE active flexion to 150 degrees, ER to 30 degrees and IR to 30 degrees for improved function  4. Pt will improve Apley's Scratch to near equal bilaterally for improved dressing and reaching. 5. L UE strength will increase to 5-/5 for return to previous level of function. OUTCOME MEASURE:   Tool Used: Disabilities of the Arm, Shoulder and Hand (DASH) Questionnaire - Quick Version  Score:  Initial: 38/55  Most Recent: X/55 (Date: -- )   Interpretation of Score: The DASH is designed to measure the activities of daily living in person's with upper extremity dysfunction or pain. Each section is scored on a 1-5 scale, 5 representing the greatest disability. The scores of each section are added together for a total score of 55. MEDICAL NECESSITY:   · Patient is expected to demonstrate progress in strength and range of motion to return to previous level of function. REASON FOR SERVICES/OTHER COMMENTS:  · Patient continues to require present interventions due to patient's inability to sleep. Total Duration:  PT Patient Time In/Time Out  Time In: 1335  Time Out: 1420    Rehabilitation Potential For Stated Goals: Good  Regarding Kerry Welch's therapy, I certify that the treatment plan above will be carried out by a therapist or under their direction. Thank you for this referral,  Abrahan Kilpatrick, PT     Referring Physician Signature:  Nyae Hollins MD _______________________________ Date _____________     PAIN/SUBJECTIVE:   Initial: Pain Intensity 1: (5-10/10)  Post Session:  5/10   HISTORY:   History of Injury/Illness (Reason for Referral):  Pt is an 80 y.o. female presenting to PT with RIGHT shoulder pain. Pt reports that 6-7 years ago she got up to go to the restroom and fell. She hit her right side of her head on the bed side table. Had a concussion and injured a nerve. States that it will flare up and will \"lock down\" her arm. Had previously been on strong narcotics for a while got off of the narcotics and started exercising. In September she fell again and hit the right side of her head and shoulder again. States that rain makes the pain worse. Describes the pain as starting in her head and running down into her right arm. Heating pads will help temporarily. Lying down at night will cause spasms and headaches. Went to the ER on  Monday due to headaches and \"locked down\" shoulder. When her arm is bad she has to use her other hand to assist moving it. Has difficulty lying on the right side at night. Several years ago she was told she needed a shoulder surgery due to severe arthritis. Enjoys going to [employer]. Past Medical History/Comorbidities:   Ms. Cecil Smith  has a past medical history of Chronic constipation, Colon cancer ([facility] [address] (1992), Coronary atherosclerosis of native coronary artery (6/11/2013), Hyperlipidemia LDL goal <70, Hypertension, Menopause, Mild diastolic dysfunction (54/15/7662), Mitral regurgitation (02/18/2009), OA (osteoarthritis) (6/11/2013), PUD (peptic ulcer disease) (1980's), Urge incontinence, and Vitamin D deficiency. Ms. Cecil Smith  has a past surgical history that includes hx hip replacement (Right, 11/13/2014); hx orthopaedic (Left); colonoscopy (N/A, 3/10/2017); hx coronary stent placement (4/2012 & 6/2019); hx tubal ligation; and hx hemorrhoidectomy (1970).   Social History/Living Environment:     unknown  Prior Level of Function/Work/Activity:  Works out at [employer]     Ambulatory/Rehab [address] [place] [address] Assessment   Risk Factors:       (5)  History of Recent Falls [w/in 3 months] Ability to Rise from Chair:       (0)  Ability to rise in a single movement   Falls Prevention Plan:       No modifications necessary   Total: (5 or greater = High Risk): 5   ©2010 [facility]. All Rights Reserved. [facility] Patent #[id number],222. Federal Law prohibits the replication, distribution or use without written permission from [facility] [employer]   Current Medications:       Current Outpatient Medications:     NORVASC 10 mg tablet, Take 1 Tab by mouth every morning. Brand medically necessary! Indications: high blood pressure, Disp: 90 Tab, Rfl: 3    aspirin delayed-release 81 mg tablet, Take 1 Tab by mouth daily. , Disp: 1 Tab, Rfl: 1    loratadine (CLARITIN) 10 mg tablet, Take 10 mg by mouth daily as needed. , Disp: , Rfl:     nitroglycerin (NITROSTAT) 0.4 mg SL tablet, 1 Tab by SubLINGual route every five (5) minutes as needed for Chest Pain (If no relief after 2 tabs take the third tab and call EMS). , Disp: 1 Bottle, Rfl: 1    cholecalciferol (VITAMIN D3) 1,000 unit cap, Take 1,000 Units by mouth daily. , Disp: , Rfl:     polyethylene glycol (MIRALAX) 17 gram/dose powder, Take 17 g by mouth nightly. (Patient taking differently: Take 17 g by mouth as needed (constipation). mix with beverage of choice), Disp: 510 g, Rfl: 11    ascorbic acid, vitamin C, (VITAMIN C) 1,000 mg tablet, Take 100 mg by mouth daily. , Disp: , Rfl:    Date Last Reviewed:  2/19/2020    Number of Personal Factors/Comorbidities that affect the Plan of Care: 1-2: MODERATE COMPLEXITY   EXAMINATION:   Observation/Orthostatic Postural Assessment:        APLEYs SCRATCH Cross Reach ER FIR   Left posterior shoulder T3 L2   Right anterior shoulder ear hip         Scapular posture-  Elevated on R  Palpation:          Tenderness in UT  Hypertonicity in UT and levator scap  Joint mobility - decreased in [facility]  ROM:            UPPER EXTREMITY      Left  Right    Shoulder Flexion   Shoulder ABD  Shoulder ER  Shoulder IR  Elbow Flexion  Elbow Extension 160 Degrees  160 Degrees  60 Degrees  60 Degrees  WNL  WNL Shoulder Flexion   Shoulder ABD  Shoulder ER  Shoulder IR  Elbow Flexion  Elbow Extension   135 Degrees  100 Degrees  9 Degrees  5 Degrees  WNL  WNL        CERVICAL SPINE    AROM    Flexion   Extension  Right Rotation  Left Rotation  Right Sidebend  Left Sidebend 80 % tight  50 % pain  50 % pain  50 % pain  20 %  20 % pain        Strength:            UPPER EXTREMITY      Left  Right    Shoulder Flexion   Shoulder ABD  Shoulder ER  Shoulder IR  Elbow Flexion  Elbow Extension 5-/5  5-/5  5-/5  5-/5  5-/5  5-/5 Shoulder Flexion   Shoulder ABD  Shoulder ER  Shoulder IR  Elbow Flexion  Elbow Extension   4-/5  4-/5  4-/5  4-/5  4-/5  4-/5              Body Structures Involved:  1. Joints  2. Muscles Body Functions Affected:  1. Sensory/Pain  2. Movement Related Activities and Participation Affected:  1.  General Tasks and Demands   Number of elements (examined above) that affect the Plan of Care: 3: MODERATE COMPLEXITY   CLINICAL PRESENTATION:   Presentation: Evolving clinical presentation with changing clinical characteristics: MODERATE COMPLEXITY   CLINICAL DECISION MAKING:   Use of outcome tool(s) and clinical judgement create a POC that gives a: Questionable prediction of patient's progress: MODERATE COMPLEXITY

## 2020-02-19 NOTE — PROGRESS NOTES
Michael Lomeli  : 1935  Primary: [employer] Hmo  Secondary:  Therapy Center at [facility]  [address], [address], [address]  [phone]   [phone]        OUTPATIENT PHYSICAL THERAPY: Daily Treatment Note 2020  Visit Count:  1  ICD-10: Treatment Diagnosis: RIGHT shoulder pain M25.511     Precautions/Allergies:   Penicillins; Plavix [clopidogrel]; Aspirin; Brilinta [ticagrelor]; Lipitor [atorvastatin]; Lyrica [pregabalin]; Polyethylene glycol ointment; Soy; Tramadol; and Voltaren [diclofenac sodium]   TREATMENT PLAN:  Effective Dates: 2020 TO 2020 (60 days). Frequency/Duration: 2 times a week for 60 Day(s)    Pre-treatment Symptoms/Complaints:  RIGHT shoulder pain  Pain: Initial: Pain Intensity 1: (5-10/10)  Post Session:  5/10   Medications Last Reviewed:  2020  Updated Objective Findings:  See evaluation note from today  TREATMENT:     THERAPEUTIC EXERCISE: (13 minutes):  Exercises per grid below to improve mobility and strength. Required moderate visual, verbal, manual and tactile cues to promote proper body alignment, promote proper body posture and promote proper body mechanics. Progressed resistance, range, repetitions and complexity of movement as indicated. Date:  20 Date:   Date:     Activity/Exercise Parameters Parameters Parameters   Chin tuck 20 reps     Row 20 reps red     Pull down 20 reps red                                 MedBridge Portal  Treatment/Session Summary:    · Response to Treatment:  Pt would benefit from skilled therapy to address the aforementioned deficits that limit functional ability to return to previous level of function. · Communication/Consultation:  eval sent to referring physician  · Equipment provided today:  HEP  · Recommendations/Intent for next treatment session: Next visit will focus on progressing core strengthening.     Total Treatment Billable Duration:  13 minutes     PT Patient Time In/Time Out  Time In: 1335  Time Out: [address], PT    Future Appointments   Date Time Provider Yolie Connors   2/20/2020  2:30 PM Vijaya Zulma, [place] [facility] SFE   2/25/2020  1:45 PM Vijaya Leer, PTA [facility] SFE   2/27/2020 11:15 AM SFE MARSHAL BI ROOM 2 [facility]AM SFE   2/27/2020  2:30 PM Vijaya Mengr, PTA [facility] SFE   3/3/2020  2:30 PM Rocío Hylton, PT [facility] SFE   3/5/2020  1:45 PM Vijaya Leer, PTA [facility] SFE   3/10/2020  1:45 PM Vijaya Leer, PTA [facility] SFE   3/12/2020  2:30 PM Alisson Lobo, PTA [facility] SFE   6/25/2020 10:00 AM MAT LAB SSA MAT MAT   7/2/2020 10:00 AM Aniceto Unger, MISSAEL SSA MAT MAT

## 2020-02-20 ENCOUNTER — HOSPITAL ENCOUNTER (OUTPATIENT)
Dept: PHYSICAL THERAPY | Age: 85
Discharge: HOME | End: 2020-02-20
Payer: MEDICARE

## 2020-02-20 PROCEDURE — 97110 THERAPEUTIC EXERCISES: CPT

## 2020-02-20 PROCEDURE — 97140 MANUAL THERAPY 1/> REGIONS: CPT

## 2020-02-20 NOTE — PROGRESS NOTES
Mitra Skinner  : 1935  Primary: [employer] o  Secondary:  Therapy Center at [address] [place] [address], [address] [place] [address],[address], [place] [address].  [phone]   [phone]        OUTPATIENT PHYSICAL THERAPY: Daily Treatment Note 2020  Visit Count:  1  ICD-10: Treatment Diagnosis: RIGHT shoulder pain M25.511     Precautions/Allergies:   Penicillins; Plavix [clopidogrel]; Aspirin; Brilinta [ticagrelor]; Lipitor [atorvastatin]; Lyrica [pregabalin]; Polyethylene glycol ointment; Soy; Tramadol; and Voltaren [diclofenac sodium]   TREATMENT PLAN:  Effective Dates: 2020 TO 2020 (60 days). Frequency/Duration: 2 times a week for 60 Day(s)    Pre-treatment Symptoms/Complaints:  RIGHT shoulder pain \"the exercises have helped\"  Pain: Initial:    Post Session:  5/10   Medications Last Reviewed:  2020  Updated Objective Findings:  None Today  TREATMENT:     THERAPEUTIC EXERCISE: (13 minutes):  Exercises per grid below to improve mobility and strength. Required moderate visual, verbal, manual and tactile cues to promote proper body alignment, promote proper body posture and promote proper body mechanics. Progressed resistance, range, repetitions and complexity of movement as indicated. Date:  20 Date:   Date:     Activity/Exercise Parameters Parameters Parameters   Chin tuck 20 reps     Row 20 reps red     Pull down 20 reps red     ER Bilaterally x 15 reps     Pulleys Flexion x 15 reps flexion                 Manual:(30 min)  Right Upper Trap soft tissue to decrease tightness. ROM all planes in Right Shoulder to increase ROM and decrease pain. Moist Heat (10) to Right shoulder secondary to pain and soreness    [employer] Portal  Treatment/Session Summary:    · Response to Treatment:  Patient did well today. Patient had more relieve after therapy today in right upper trap and shoulder.     · Communication/Consultation:  None today  · Equipment provided today: HEP  · Recommendations/Intent for next treatment session: Next visit will focus on progressing core strengthening.     Total Treatment Billable Duration:  43 minutes  10 min non billable    PT Patient Time In/Time Out  Time In: 1430  Time Out: Cheikh Marvin, KARAN    Future Appointments   Date Time Provider Yolie Connors   2/20/2020  2:30 PM Rumaldo Heimlich, [place] [facility] SFE   2/25/2020  1:45 PM Angel Heimlich, PTA [facility] SFE   2/27/2020 11:15 AM SFE MARSHAL BI ROOM 2 SFERMAM SFE   2/27/2020  2:30 PM Angel Heimlich, PTA [facility] SFE   3/3/2020  2:30 PM Kalpana Hylton, PT [facility] SFE   3/5/2020  1:45 PM Angel Heimlich, PTA [facility] SFE   3/10/2020  1:45 PM Angel Heimlich, PTA [facility] SFE   3/12/2020  2:30 PM Ny Lobo, PTA [facility] SFE   6/25/2020 10:00 AM MAT LAB [facility] MAT MAT   7/2/2020 10:00 AM Conrad Unger PA-C [facility] MAT MAT

## 2020-02-25 ENCOUNTER — HOSPITAL ENCOUNTER (OUTPATIENT)
Dept: PHYSICAL THERAPY | Age: 85
Discharge: HOME | End: 2020-02-25
Payer: MEDICARE

## 2020-02-25 PROCEDURE — 97140 MANUAL THERAPY 1/> REGIONS: CPT

## 2020-02-25 PROCEDURE — 97110 THERAPEUTIC EXERCISES: CPT

## 2020-02-25 NOTE — PROGRESS NOTES
Angella Smoker  : 1935  [facility]: [facility] [employer] Medicare o  Secondary:  Therapy Center at [place] [address],[address], [place] [address].  [phone]   [phone]        OUTPATIENT PHYSICAL THERAPY: Daily Treatment Note 2020  Visit Count:  1  ICD-10: Treatment Diagnosis: RIGHT shoulder pain M25.511     Precautions/Allergies:   Penicillins; Plavix [clopidogrel]; Aspirin; Brilinta [ticagrelor]; Lipitor [atorvastatin]; Lyrica [pregabalin]; Polyethylene glycol ointment; Soy; Tramadol; and Voltaren [diclofenac sodium]   TREATMENT PLAN:  Effective Dates: 2020 TO 2020 (60 days). Frequency/Duration: 2 times a week for 60 Day(s)    Pre-treatment Symptoms/Complaints:  RIGHT shoulder pain \"the exercises have helped and senior action\"  Pain: Initial:   0/10 but night is the worst 5/10  Post Session:  5/10   Medications Last Reviewed:  2020  Updated Objective Findings:  None Today  TREATMENT:     THERAPEUTIC EXERCISE: (13 minutes):  Exercises per grid below to improve mobility and strength. Required moderate visual, verbal, manual and tactile cues to promote proper body alignment, promote proper body posture and promote proper body mechanics. Progressed resistance, range, repetitions and complexity of movement as indicated. Date:  20 Date:   Date:     Activity/Exercise Parameters Parameters Parameters   Chin tuck 20 reps     Row 20 reps red     Pull down 20 reps red     ER Bilaterally x 15 reps     Pulleys Flexion x 15 reps flexion     Supine cane into flexion X 10 reps     Supine push up with cane X 10 reps       Manual:(30 min)  Right Upper Trap soft tissue to decrease tightness. ROM all planes in Right Shoulder to increase ROM and decrease pain. Moist Heat (10) to Right shoulder secondary to pain and soreness    MedBridge Portal  Treatment/Session Summary:    · Response to Treatment:  Patient did well today.  Increased ROM today into flexion-limited ER/IR. Patient feels therapy is helping and she continue's the exercises at home and also goes to senior action. Most her pain is a night when she can't get comfortable. · Communication/Consultation:  None today  · Equipment provided today:  HEP  · Recommendations/Intent for next treatment session: Next visit will focus on progressing core strengthening.     Total Treatment Billable Duration:  43 minutes    PT Patient Time In/Time Out  Time In: 1345  Time Out: [address], PTA    Future Appointments   Date Time Provider Yolie Connors   2/27/2020 11:15 AM SFE MARSHAL BI ROOM 2 [facility]MAM SFE   2/27/2020  2:30 PM Idalia Pinzon, PTA [facility] SFE   3/2/2020  1:00 PM Julio Singh PA-C [facility] MAT MAT   3/3/2020  2:30 PM Paola Hylton, PT [facility] SFE   3/5/2020  1:45 PM Idalia Pinzon, PTA [facility] SFE   3/10/2020  1:45 PM Idalia Pinzon, PTA [facility] SFE   3/12/2020  2:30 PM Reba Lobo, PTA [facility] SFE   6/25/2020 10:00 AM MAT LAB [facility] MAT MAT   7/2/2020 10:00 AM Ngoc Unger PA-C [facility]SA MAT MAT

## 2020-02-27 ENCOUNTER — HOSPITAL ENCOUNTER (OUTPATIENT)
Dept: PHYSICAL THERAPY | Age: 85
Discharge: HOME | End: 2020-02-27
Payer: MEDICARE

## 2020-02-27 PROCEDURE — 97110 THERAPEUTIC EXERCISES: CPT

## 2020-02-27 PROCEDURE — 97140 MANUAL THERAPY 1/> REGIONS: CPT

## 2020-02-27 NOTE — PROGRESS NOTES
Jayden Anthony  : 1935  Primary: [employer] Hmo  Secondary:  Therapy Center at [facility]  [address], [address], [address]  [phone]   [phone]        OUTPATIENT PHYSICAL THERAPY: Daily Treatment Note 2020  Visit Count:  1  ICD-10: Treatment Diagnosis: RIGHT shoulder pain M25.511     Precautions/Allergies:   Penicillins; Plavix [clopidogrel]; Aspirin; Brilinta [ticagrelor]; Lipitor [atorvastatin]; Lyrica [pregabalin]; Polyethylene glycol ointment; Soy; Tramadol; and Voltaren [diclofenac sodium]   TREATMENT PLAN:  Effective Dates: 2020 TO 2020 (60 days). Frequency/Duration: 2 times a week for 60 Day(s)    Pre-treatment Symptoms/Complaints:  RIGHT shoulder pain \"the exercises have helped and senior action\" \"Sorry I am late, I couldn't find parking and traffic was bad\"  Pain: Initial:   0/10 but night is the worst 5/10  Post Session:  5/10   Medications Last Reviewed:  2020  Updated Objective Findings:  None Today  TREATMENT:     THERAPEUTIC EXERCISE: (15 minutes):  Exercises per grid below to improve mobility and strength. Required moderate visual, verbal, manual and tactile cues to promote proper body alignment, promote proper body posture and promote proper body mechanics. Progressed resistance, range, repetitions and complexity of movement as indicated. Date:  20 Date:   Date:     Activity/Exercise Parameters Parameters Parameters   Chin tuck 20 reps     Row 20 reps red     Pull down 20 reps red     ER Bilaterally x 15 reps     Pulleys Flexion x 15 reps flexion     Supine cane into flexion X 10 reps     Supine push up with cane X 10 reps       Manual:(15 min)  Right Upper Trap soft tissue to decrease tightness. ROM all planes in Right Shoulder to increase ROM and decrease pain.   Moist Heat (10) to Right shoulder secondary to pain and soreness    MedBridge Portal  Treatment/Session Summary:    · Response to Treatment:  More pain today in shoulder -improving with ROM overall. Tightness ER. Patient continues to do HEP and senior action. Patient was 15 minutes late for appointment . · Communication/Consultation:  None today  · Equipment provided today:  HEP  · Recommendations/Intent for next treatment session: Next visit will focus on progressing core strengthening.     Total Treatment Billable Duration:  33 minutes    PT Patient Time In/Time Out  Time In: 6752  Time Out: Cheikh Marvin, PTA    Future Appointments   Date Time Provider Yolie Connors   3/2/2020  1:00 PM Sj Bihsop PA-C [facility] MAT MAT   3/3/2020  2:30 PM Gordy Hylton, PT [facility] SFE   3/5/2020  1:45 PM Eli Doshi, PTA [facility] SFE   3/10/2020  1:45 PM Eli Doshi, PTA [facility] SFE   3/12/2020  2:30 PM Yoshi Lobo, PTA [facility] SFE   6/25/2020 10:00 AM MAT LAB [facility] MAT MAT   7/2/2020 10:00 AM Chaparro Unger PA-C [facility] MAT MAT

## 2020-03-03 ENCOUNTER — HOSPITAL ENCOUNTER (OUTPATIENT)
Dept: PHYSICAL THERAPY | Age: 85
Discharge: HOME | End: 2020-03-03
Payer: MEDICARE

## 2020-03-03 PROCEDURE — 97110 THERAPEUTIC EXERCISES: CPT

## 2020-03-03 PROCEDURE — 97140 MANUAL THERAPY 1/> REGIONS: CPT

## 2020-03-03 NOTE — PROGRESS NOTES
Torrie Miller  : 1935  Primary: [employer] o  Secondary:  Therapy Center at [address] [place] [address], [address] [place] [address],[address], [address] [facility] [place]  [phone]   [phone]        OUTPATIENT PHYSICAL THERAPY: Daily Treatment Note 3/3/2020  Visit Count:  1  ICD-10: Treatment Diagnosis: RIGHT shoulder pain M25.511     Precautions/Allergies:   Penicillins; Plavix [clopidogrel]; Aspirin; Brilinta [ticagrelor]; Lipitor [atorvastatin]; Lyrica [pregabalin]; Polyethylene glycol ointment; Soy; Tramadol; and Voltaren [diclofenac sodium]   TREATMENT PLAN:  Effective Dates: 2020 TO 2020 (60 days). Frequency/Duration: 2 times a week for 60 Day(s)    Pre-treatment Symptoms/Complaints:  RIGHT shoulder pain 3/3/2020 Pt states that she is having pain at night but her doctor wants her to do PT for another 2 weeks. Pain: Initial:   0/10 but night is the worst 5/10  Post Session:  5/10   Medications Last Reviewed:  3/3/2020  Updated Objective Findings:  None Today  TREATMENT:     THERAPEUTIC EXERCISE: (18 minutes):  Exercises per grid below to improve mobility and strength. Required moderate visual, verbal, manual and tactile cues to promote proper body alignment, promote proper body posture and promote proper body mechanics. Progressed resistance, range, repetitions and complexity of movement as indicated. Date:  20 Date:  20 Date:     Activity/Exercise Parameters Parameters Parameters   Chin tuck 20 reps     Row 20 reps red 20 reps green    Pull down 20 reps red 20 reps green    ER Bilaterally x 15 reps Bilaterally 15 reps red    Pulleys Flexion x 15 reps flexion     Supine cane into flexion X 10 reps     Supine push up with cane X 10 reps       UBE level 2.0  5 min fwd    pendulum  3 min                      Manual:(25 min)  Right Upper Trap soft tissue to decrease tightness. ROM all planes in Right Shoulder to increase ROM and decrease pain.       [facility] Portal  Treatment/Session Summary:    · Response to Treatment: decreased intensity of HEP and therapeutic exercise in PT to assess response. .  · Communication/Consultation:  None today  · Equipment provided today:  HEP  · Recommendations/Intent for next treatment session: Next visit will focus on progressing core strengthening.     Total Treatment Billable Duration:  43 minutes       Abiel Leon PT    Future Appointments   Date Time Provider Yolie Connors   3/5/2020  1:45 PM Ileene [place], PTA [facility]   3/10/2020  1:45 PM Ileene [place], PTA [facility]   3/12/2020  2:30 PM Ileene [place], PTA [facility] E   6/25/2020 10:00 AM MAT LAB [facility] MAT MAT   7/10/2020  1:00 PM Meredith Unger PA-C [facility] MAT MAT

## 2020-03-05 ENCOUNTER — HOSPITAL ENCOUNTER (OUTPATIENT)
Dept: PHYSICAL THERAPY | Age: 85
Discharge: HOME | End: 2020-03-05
Payer: MEDICARE

## 2020-03-05 PROCEDURE — 97110 THERAPEUTIC EXERCISES: CPT

## 2020-03-05 PROCEDURE — 97140 MANUAL THERAPY 1/> REGIONS: CPT

## 2020-03-05 NOTE — PROGRESS NOTES
Andrea Mace  : 1935  Primary: [employer]  Secondary:  [address] at [place] [id number] [facility], [address] [place] [address],[address], [place] [address].  [phone]   [phone]        OUTPATIENT PHYSICAL THERAPY: Daily Treatment Note 3/5/2020  Visit Count:  1  ICD-10: Treatment Diagnosis: RIGHT shoulder pain M25.511     Precautions/Allergies:   Penicillins; Plavix [clopidogrel]; Aspirin; Brilinta [ticagrelor]; Lipitor [atorvastatin]; Lyrica [pregabalin]; Polyethylene glycol ointment; Soy; Tramadol; and Voltaren [diclofenac sodium]   TREATMENT PLAN:  Effective Dates: 2020 TO 2020 (60 days). Frequency/Duration: 2 times a week for 60 Day(s)    Pre-treatment Symptoms/Complaints:  RIGHT shoulder pain 3/5/2020 . Pain: Initial:   0/10 but night is the worst 5/10  Post Session:  5/10   Medications Last Reviewed:  3/5/2020  Updated Objective Findings:  None Today  TREATMENT:     THERAPEUTIC EXERCISE: (18 minutes):  Exercises per grid below to improve mobility and strength. Required moderate visual, verbal, manual and tactile cues to promote proper body alignment, promote proper body posture and promote proper body mechanics. Progressed resistance, range, repetitions and complexity of movement as indicated. Date:  20 Date:  20 Date:     Activity/Exercise Parameters Parameters Parameters   Chin tuck 20 reps     Row 20 reps red 20 reps green    Pull down 20 reps red 20 reps green    ER Bilaterally x 15 reps Bilaterally 15 reps red    Pulleys Flexion x 15 reps flexion     Supine cane into flexion X 10 reps     Supine push up with cane X 10 reps       UBE level 2.0  5 min fwd    pendulum  3 min                Moist heat  5 min xlayerd skin clear. Manual:(25 min)  Right Upper Trap soft tissue to decrease tightness. ROM all planes in Right Shoulder to increase ROM and decrease pain.       [employer] Portal  Treatment/Session Summary:    · Response to Treatment: Worked a lot today on ROM and manual trigger point through right upper trap and into shoulder and scapula to decrease tightness and soreness after tx. · Communication/Consultation:  None today  · Equipment provided today:  HEP  · Recommendations/Intent for next treatment session: Next visit will focus on progressing core strengthening.     Total Treatment Billable Duration:  43 minutes    PT Patient Time In/Time Out  Time In: 1345  Time Out: [address] [place], [place]    Future Appointments   Date Time Provider Yolie Connors   3/5/2020  1:45 PM Rumaldo Heimlich, PTA [facility] SFE   3/10/2020  1:45 PM Rumaldo Heimlich, PTA [facility] SFE   3/12/2020  2:30 PM Rumaldo Heimlich, PTA [facility] SFE   6/25/2020 10:00 AM MAT LAB [facility] MAT MAT   7/10/2020  1:00 PM Conrad Unger, MISSAEL [facility] MAT MAT

## 2020-03-10 ENCOUNTER — HOSPITAL ENCOUNTER (OUTPATIENT)
Dept: PHYSICAL THERAPY | Age: 85
Discharge: HOME | End: 2020-03-10
Payer: MEDICARE

## 2020-03-10 PROCEDURE — 97110 THERAPEUTIC EXERCISES: CPT

## 2020-03-10 PROCEDURE — 97140 MANUAL THERAPY 1/> REGIONS: CPT

## 2020-03-10 NOTE — PROGRESS NOTES
Angella Smoker  : 1935  Primary: [employer] Hmo  Secondary:  Therapy Center at [place] [id number] [facility], [address] [place] [address],[address], [facility] [address].  [phone]   [phone]        OUTPATIENT PHYSICAL THERAPY: Daily Treatment Note 3/10/2020  Visit Count:  1  ICD-10: Treatment Diagnosis: RIGHT shoulder pain M25.511     Precautions/Allergies:   Penicillins; Plavix [clopidogrel]; Aspirin; Brilinta [ticagrelor]; Lipitor [atorvastatin]; Lyrica [pregabalin]; Polyethylene glycol ointment; Soy; Tramadol; and Voltaren [diclofenac sodium]   TREATMENT PLAN:  Effective Dates: 2020 TO 2020 (60 days). Frequency/Duration: 2 times a week for 60 Day(s)    Pre-treatment Symptoms/Complaints:  RIGHT shoulder pain 3/10/2020 . \"My hip is giving me fits and my shoulder is locking up\". Pain: Initial:   10 but night is the worst 10  Post Session:  5/10   Medications Last Reviewed:  3/10/2020  Updated Objective Findings:  None Today  TREATMENT:     THERAPEUTIC EXERCISE: (18 minutes):  Exercises per grid below to improve mobility and strength. Required moderate visual, verbal, manual and tactile cues to promote proper body alignment, promote proper body posture and promote proper body mechanics. Progressed resistance, range, repetitions and complexity of movement as indicated. Date:  20 Date:  03/10/20 Date:     Activity/Exercise Parameters Parameters Parameters   Chin tuck 20 reps     Row 20 reps red 20 reps green    Pull down 20 reps red 20 reps green    ER Bilaterally x 15 reps Bilaterally 15 reps red    Pulleys Flexion x 15 reps flexion     Supine cane into flexion X 10 reps     Supine push up with cane X 10 reps       UBE level 2.0  5 min fwd    pendulum  3 min                      Manual:(25 min)  Right Upper Trap soft tissue to decrease tightness. ROM all planes in Right Shoulder to increase ROM and decrease pain.       [facility] Portal  Treatment/Session Summary: · Response to Treatment: Worked a lot today on ROM and manual trigger point through right upper trap and into shoulder and scapula to decrease tightness and soreness after tx. Patient has one more appointment and wants to continue it at home at this time. · Communication/Consultation:  None today  · Equipment provided today:  HEP  · Recommendations/Intent for next treatment session: Next visit will focus on progressing core strengthening.     Total Treatment Billable Duration:  43 minutes    PT Patient Time In/Time Out  Time In: 1345  Time Out: [address] [place], [place]    Future Appointments   Date Time Provider Yolie Connors   3/10/2020  1:45 PM Steven Cost, PTA [facility] SFE   3/12/2020  2:30 PM Steven Cost, PTA [facility] SFE   6/25/2020 10:00 AM MAT LAB [facility] MAT MAT   7/10/2020  1:00 PM Rubin Unger PA-C [facility] MAT MAT

## 2020-03-12 ENCOUNTER — HOSPITAL ENCOUNTER (OUTPATIENT)
Dept: PHYSICAL THERAPY | Age: 85
Discharge: HOME | End: 2020-03-12
Payer: MEDICARE

## 2020-03-12 PROCEDURE — 97110 THERAPEUTIC EXERCISES: CPT

## 2020-03-12 PROCEDURE — 97140 MANUAL THERAPY 1/> REGIONS: CPT

## 2020-03-12 NOTE — PROGRESS NOTES
Pauletta Siemens  : 1935  Primary: [employer] Hmo  Secondary:  Therapy Center at [address] [facility], [address] [facility],[address], [address] [facility]  [phone]   [phone]        OUTPATIENT PHYSICAL THERAPY: Daily Treatment Note 3/12/2020  Visit Count:  1  ICD-10: Treatment Diagnosis: RIGHT shoulder pain M25.511     Precautions/Allergies:   Penicillins; Plavix [clopidogrel]; Aspirin; Brilinta [ticagrelor]; Lipitor [atorvastatin]; Lyrica [pregabalin]; Polyethylene glycol ointment; Soy; Tramadol; and Voltaren [diclofenac sodium]   TREATMENT PLAN:  Effective Dates: 2020 TO 2020 (60 days). Frequency/Duration: 2 times a week for 60 Day(s)    Pre-treatment Symptoms/Complaints:  RIGHT shoulder pain 3/12/2020 . \"I am doing ok\"  Pain: Initial:   10 but night is the worst 10  Post Session:  5/10   Medications Last Reviewed:  3/12/2020  Updated Objective Findings:  None Today  TREATMENT:     THERAPEUTIC EXERCISE: (18 minutes):  Exercises per grid below to improve mobility and strength. Required moderate visual, verbal, manual and tactile cues to promote proper body alignment, promote proper body posture and promote proper body mechanics. Progressed resistance, range, repetitions and complexity of movement as indicated. Date:  20 Date:  20 Date:     Activity/Exercise Parameters Parameters Parameters   Chin tuck 20 reps     Row 20 reps red 20 reps green    Pull down 20 reps red 20 reps green    ER Bilaterally x 15 reps Bilaterally 15 reps red    Pulleys Flexion x 15 reps flexion     Supine cane into flexion X 10 reps     Supine push up with cane X 10 reps       UBE level 2.0  5 min fwd    pendulum  3 min                      Manual:(30 min)  Right Upper Trap soft tissue to decrease tightness. ROM all planes in Right Shoulder to increase ROM and decrease pain.       [employer] Portal  Treatment/Session Summary:    · Response to Treatment: Patient had improving with ROM today- less pain today as well. She is going to make another 3-4 weeks of appointment to help maximize function of Right shoulder. · Communication/Consultation:  None today  · Equipment provided today:  HEP  · Recommendations/Intent for next treatment session: Next visit will focus on progressing core strengthening.     Total Treatment Billable Duration:  43 minutes    PT Patient Time In/Time Out  Time In: 1430  Time Out: Cheikh HENDRICKS [address] [place] [address]    Future Appointments   Date Time Provider Yolie Connors   3/12/2020  2:30 PM Idalia Pinzon PTA [facility] SFE   6/25/2020 10:00 AM MAT LAB [facility] MAT MAT   7/10/2020  1:00 PM Ngoc Unger PA-C [facility] MAT MAT

## 2020-03-18 ENCOUNTER — APPOINTMENT (OUTPATIENT)
Dept: PHYSICAL THERAPY | Age: 85
End: 2020-03-18
Payer: MEDICARE

## 2020-03-25 ENCOUNTER — APPOINTMENT (OUTPATIENT)
Dept: PHYSICAL THERAPY | Age: 85
End: 2020-03-25
Payer: MEDICARE

## 2020-06-18 NOTE — PROGRESS NOTES
Shonda Anthony  : 1935  Primary: [employer] Hmo  Secondary:  [address]  at [place] [address],[address], [place] [address].  [phone]   [phone]          OUTPATIENT PHYSICAL THERAPY:Discontinuation Summary 2020   ICD-10: Treatment Diagnosis: RIGHT shoulder pain M25.511     Precautions/Allergies:   Penicillins; Plavix [clopidogrel]; Aspirin; Brilinta [ticagrelor]; Lipitor [atorvastatin]; Lyrica [pregabalin]; Polyethylene glycol ointment; Soy; Tramadol; and Voltaren [diclofenac sodium]   TREATMENT PLAN:  Effective Dates: 2020 TO 2020 (60 days). Frequency/Duration: 2 times a week for 60 Day(s) MEDICAL/REFERRING DIAGNOSIS:  shoulder, right pain M25.511  DATE OF ONSET: chronic  REFERRING PHYSICIAN: Bertha Amador MD MD Orders: eval and treat  Return MD Appointment: Early 2020     INITIAL ASSESSMENT:  Ms. Azul Jeronimo attended 6 visits since 20 with fair improvement of symptoms but self-D/C. PROBLEM LIST (Impacting functional limitations):  1. Decreased Strength  2. Decreased ADL/Functional Activities  3. Increased Pain  4. Decreased Flexibility/Joint Mobility  5. Decreased [place] with Home Exercise Program INTERVENTIONS PLANNED: (Treatment may consist of any combination of the following)  1. Cold  2. Home Exercise Program (HEP)  3. Manual Therapy  4. Range of Motion (ROM)  5. Therapeutic Exercise/Strengthening   Pt discontinued care therefore unable to assess progress. GOALS: (Goals have been discussed and agreed upon with patient.)  Short-Term Functional Goals: Time Frame: 4 weeks  1. Pt will be I with HEP to allow for continued progress with symptom management. 2. Pt will improve passive flexion to 145 degrees, ER to 15 degrees and IR to 15 degrees for improved function. Discharge Goals: Time Frame: 8 weeks  1. Pt will improve qDASH score to <30 to reflect an improvement in function.    2. Pt will improve c/o pain to 3/10 to allow for improved tolerance for return to previous level of function. 3. Pt will improve RIGHT UE active flexion to 150 degrees, ER to 30 degrees and IR to 30 degrees for improved function  4. Pt will improve Apley's Scratch to near equal bilaterally for improved dressing and reaching. 5. L UE strength will increase to 5-/5 for return to previous level of function. OUTCOME MEASURE:   Tool Used: Disabilities of the Arm, Shoulder and Hand (DASH) Questionnaire - Quick Version  Score:  Initial: 38/55  Most Recent: X/55 (Date: -- )   Interpretation of Score: The DASH is designed to measure the activities of daily living in person's with upper extremity dysfunction or pain. Each section is scored on a 1-5 scale, 5 representing the greatest disability. The scores of each section are added together for a total score of 55. MEDICAL NECESSITY:   · Patient is expected to demonstrate progress in strength and range of motion to return to previous level of function. REASON FOR SERVICES/OTHER COMMENTS:  · Patient continues to require present interventions due to patient's inability to sleep. Total Duration:  PT Patient Time In/Time Out  Time In: 1335  Time Out: 1420    Rehabilitation Potential For Stated Goals: Good  Regarding Kerry Welch's therapy, I certify that the treatment plan above will be carried out by a therapist or under their direction. Thank you for this referral,  Betty Verdin PT          HISTORY:   History of Injury/Illness (Reason for Referral):  Pt is an 80 y.o. female presenting to PT with RIGHT shoulder pain. Pt reports that 6-7 years ago she got up to go to the restroom and fell. She hit her right side of her head on the bed side table. Had a concussion and injured a nerve. States that it will flare up and will \"lock down\" her arm. Had previously been on strong narcotics for a while got off of the narcotics and started exercising.   In September she fell again and hit the right side of her head and shoulder again. States that rain makes the pain worse. Describes the pain as starting in her head and running down into her right arm. Heating pads will help temporarily. Lying down at night will cause spasms and headaches. Went to the ER on  Monday due to headaches and \"locked down\" shoulder. When her arm is bad she has to use her other hand to assist moving it. Has difficulty lying on the right side at night. Several years ago she was told she needed a shoulder surgery due to severe arthritis. Enjoys [place] to [place]. Past Medical History/Comorbidities:   Ms. Lucia Aviles  has a past medical history of Chronic constipation, Colon cancer ([facility] [address] (1992), Coronary atherosclerosis of native coronary artery (6/11/2013), Hyperlipidemia LDL goal <70, Hypertension, Menopause, Mild diastolic dysfunction (33/99/6682), Mitral regurgitation (02/18/2009), OA (osteoarthritis) (6/11/2013), PUD (peptic ulcer disease) (1980's), Urge incontinence, and Vitamin D deficiency. Ms. Lucia Aviles  has a past surgical history that includes hx hip replacement (Right, 11/13/2014); hx orthopaedic (Left); colonoscopy (N/A, 3/10/2017); hx coronary stent placement (4/2012 & 6/2019); hx tubal ligation; and hx hemorrhoidectomy (1970). Social History/Living Environment:     unknown  Prior Level of Function/Work/Activity:  Works out at [employer]     Ambulatory/Rehab [address] Factors:       (5)  History of Recent Falls [w/in 3 months] Ability to Rise from Chair:       (0)  Ability to rise in a single movement   Falls Prevention Plan:       No modifications necessary   Total: (5 or greater = High Risk): 5   ©2010 [facility] [place] [address] [place] [address] States Patent #[id number].  Federal Law prohibits the replication, distribution or use without written permission from [facility] [employer]   Current Medications:       Current Outpatient Medications:    hydrocortisone (HYTONE) 2.5 % topical cream, Apply  to affected area two (2) times a day. use thin layer, Disp: 30 g, Rfl: 0    NORVASC 10 mg tablet, Take 1 Tab by mouth every morning. Brand medically necessary! Indications: high blood pressure, Disp: 90 Tab, Rfl: 3    aspirin delayed-release 81 mg tablet, Take 1 Tab by mouth daily. , Disp: 1 Tab, Rfl: 1    loratadine (CLARITIN) 10 mg tablet, Take 10 mg by mouth daily as needed. , Disp: , Rfl:     nitroglycerin (NITROSTAT) 0.4 mg SL tablet, 1 Tab by SubLINGual route every five (5) minutes as needed for Chest Pain (If no relief after 2 tabs take the third tab and call EMS). , Disp: 1 Bottle, Rfl: 1    polyethylene glycol (MIRALAX) 17 gram/dose powder, Take 17 g by mouth nightly. (Patient taking differently: Take 17 g by mouth as needed (constipation). mix with beverage of choice), Disp: 510 g, Rfl: 11    ascorbic acid, vitamin C, (VITAMIN C) 1,000 mg tablet, Take 100 mg by mouth daily. , Disp: , Rfl:    Date Last Reviewed:  6/18/2020    Number of Personal Factors/Comorbidities that affect the Plan of Care: 1-2: MODERATE COMPLEXITY   EXAMINATION:   Pt discontinued care therefore unable to assess progress.   Observation/Orthostatic Postural Assessment:        APLEYs SCRATCH Cross Reach ER FIR   Left posterior shoulder T3 L2   Right anterior shoulder ear hip         Scapular posture-  Elevated on R  Palpation:          Tenderness in UT  Hypertonicity in UT and levator scap  Joint mobility - decreased in [address]  ROM:            UPPER EXTREMITY      Left  Right    Shoulder Flexion   Shoulder ABD  Shoulder ER  Shoulder IR  Elbow Flexion  Elbow Extension 160 Degrees  160 Degrees  60 Degrees  60 Degrees  WNL  WNL Shoulder Flexion   Shoulder ABD  Shoulder ER  Shoulder IR  Elbow Flexion  Elbow Extension   135 Degrees  100 Degrees  9 Degrees  5 Degrees  WNL  WNL        CERVICAL SPINE    AROM    Flexion   Extension  Right Rotation  Left Rotation  Right Sidebend  Left Sidebend 80 % tight  50 % pain  50 % pain  50 % pain  20 %  20 % pain        Strength:            UPPER EXTREMITY      Left  Right    Shoulder Flexion   Shoulder ABD  Shoulder ER  Shoulder IR  Elbow Flexion  Elbow Extension 5-/5  5-/5  5-/5  5-/5  5-/5  5-/5 Shoulder Flexion   Shoulder ABD  Shoulder ER  Shoulder IR  Elbow Flexion  Elbow Extension   4-/5  4-/5  4-/5  4-/5  4-/5  4-/5              Body Structures Involved:  1. Joints  2. Muscles Body Functions Affected:  1. Sensory/Pain  2. Movement Related Activities and Participation Affected:  1.  General Tasks and Demands   Number of elements (examined above) that affect the Plan of Care: 3: MODERATE COMPLEXITY   CLINICAL PRESENTATION:   Presentation: Evolving clinical presentation with changing clinical characteristics: MODERATE COMPLEXITY   CLINICAL DECISION MAKING:   Use of outcome tool(s) and clinical judgement create a POC that gives a: Questionable prediction of patient's progress: MODERATE COMPLEXITY

## 2020-07-24 ENCOUNTER — HOSPITAL ENCOUNTER (EMERGENCY)
Age: 85
Discharge: HOME | End: 2020-07-24
Attending: EMERGENCY MEDICINE
Payer: MEDICARE

## 2020-07-24 ENCOUNTER — APPOINTMENT (OUTPATIENT)
Dept: GENERAL RADIOLOGY | Age: 85
End: 2020-07-24
Attending: EMERGENCY MEDICINE
Payer: MEDICARE

## 2020-07-24 VITALS
RESPIRATION RATE: 16 BRPM | HEIGHT: 60 IN | BODY MASS INDEX: 24.54 KG/M2 | TEMPERATURE: 98.2 F | SYSTOLIC BLOOD PRESSURE: 152 MMHG | DIASTOLIC BLOOD PRESSURE: 87 MMHG | HEART RATE: 69 BPM | OXYGEN SATURATION: 98 % | WEIGHT: 125 LBS

## 2020-07-24 DIAGNOSIS — R07.89 CHEST DISCOMFORT: Primary | ICD-10-CM

## 2020-07-24 LAB
ALBUMIN SERPL-MCNC: 3.5 G/DL (ref 3.2–4.6)
ALBUMIN/GLOB SERPL: 0.8 {RATIO} (ref 1.2–3.5)
ALP SERPL-CCNC: 152 U/L (ref 50–136)
ALT SERPL-CCNC: 28 U/L (ref 12–65)
ANION GAP SERPL CALC-SCNC: 6 MMOL/L (ref 7–16)
APPEARANCE UR: CLEAR
AST SERPL-CCNC: 20 U/L (ref 15–37)
BACTERIA URNS QL MICRO: 0 /HPF
BASOPHILS # BLD: 0 K/UL (ref 0–0.2)
BASOPHILS NFR BLD: 1 % (ref 0–2)
BILIRUB SERPL-MCNC: 0.1 MG/DL (ref 0.2–1.1)
BILIRUB UR QL: NEGATIVE
BUN SERPL-MCNC: 17 MG/DL (ref 8–23)
CALCIUM SERPL-MCNC: 8.8 MG/DL (ref 8.3–10.4)
CASTS URNS QL MICRO: ABNORMAL /LPF
CHLORIDE SERPL-SCNC: 106 MMOL/L (ref 98–107)
CO2 SERPL-SCNC: 29 MMOL/L (ref 21–32)
COLOR UR: YELLOW
CREAT SERPL-MCNC: 0.85 MG/DL (ref 0.6–1)
DIFFERENTIAL METHOD BLD: ABNORMAL
EOSINOPHIL # BLD: 0.1 K/UL (ref 0–0.8)
EOSINOPHIL NFR BLD: 1 % (ref 0.5–7.8)
EPI CELLS #/AREA URNS HPF: ABNORMAL /HPF
ERYTHROCYTE [DISTWIDTH] IN BLOOD BY AUTOMATED COUNT: 13.1 % (ref 11.9–14.6)
GLOBULIN SER CALC-MCNC: 4.3 G/DL (ref 2.3–3.5)
GLUCOSE SERPL-MCNC: 98 MG/DL (ref 65–100)
GLUCOSE UR STRIP.AUTO-MCNC: NEGATIVE MG/DL
HCT VFR BLD AUTO: 40.4 % (ref 35.8–46.3)
HGB BLD-MCNC: 13.2 G/DL (ref 11.7–15.4)
HGB UR QL STRIP: NEGATIVE
IMM GRANULOCYTES # BLD AUTO: 0 K/UL (ref 0–0.5)
IMM GRANULOCYTES NFR BLD AUTO: 0 % (ref 0–5)
KETONES UR QL STRIP.AUTO: NEGATIVE MG/DL
LEUKOCYTE ESTERASE UR QL STRIP.AUTO: ABNORMAL
LYMPHOCYTES # BLD: 2 K/UL (ref 0.5–4.6)
LYMPHOCYTES NFR BLD: 49 % (ref 13–44)
MAGNESIUM SERPL-MCNC: 2.1 MG/DL (ref 1.8–2.4)
MCH RBC QN AUTO: 31.8 PG (ref 26.1–32.9)
MCHC RBC AUTO-ENTMCNC: 32.7 G/DL (ref 31.4–35)
MCV RBC AUTO: 97.3 FL (ref 79.6–97.8)
MONOCYTES # BLD: 0.3 K/UL (ref 0.1–1.3)
MONOCYTES NFR BLD: 8 % (ref 4–12)
NEUTS SEG # BLD: 1.7 K/UL (ref 1.7–8.2)
NEUTS SEG NFR BLD: 42 % (ref 43–78)
NITRITE UR QL STRIP.AUTO: NEGATIVE
NRBC # BLD: 0 K/UL (ref 0–0.2)
PH UR STRIP: 7 [PH] (ref 5–9)
PLATELET # BLD AUTO: 240 K/UL (ref 150–450)
PMV BLD AUTO: 10.8 FL (ref 9.4–12.3)
POTASSIUM SERPL-SCNC: 3.9 MMOL/L (ref 3.5–5.1)
PROT SERPL-MCNC: 7.8 G/DL (ref 6.3–8.2)
PROT UR STRIP-MCNC: NEGATIVE MG/DL
RBC # BLD AUTO: 4.15 M/UL (ref 4.05–5.2)
RBC #/AREA URNS HPF: ABNORMAL /HPF
SODIUM SERPL-SCNC: 141 MMOL/L (ref 136–145)
SP GR UR REFRACTOMETRY: 1 (ref 1–1.02)
TROPONIN-HIGH SENSITIVITY: 6 PG/ML (ref 0–14)
UROBILINOGEN UR QL STRIP.AUTO: 0.2 EU/DL (ref 0.2–1)
WBC # BLD AUTO: 4.2 K/UL (ref 4.3–11.1)
WBC URNS QL MICRO: ABNORMAL /HPF

## 2020-07-24 PROCEDURE — 99284 EMERGENCY DEPT VISIT MOD MDM: CPT

## 2020-07-24 PROCEDURE — 81001 URINALYSIS AUTO W/SCOPE: CPT

## 2020-07-24 PROCEDURE — 83735 ASSAY OF MAGNESIUM: CPT

## 2020-07-24 PROCEDURE — 84484 ASSAY OF TROPONIN QUANT: CPT

## 2020-07-24 PROCEDURE — 85025 COMPLETE CBC W/AUTO DIFF WBC: CPT

## 2020-07-24 PROCEDURE — 71045 X-RAY EXAM CHEST 1 VIEW: CPT

## 2020-07-24 PROCEDURE — 80053 COMPREHEN METABOLIC PANEL: CPT

## 2020-07-25 NOTE — ED TRIAGE NOTES
EMS: Pt arriving from home via [facility] with mask in place. Pt st she has heard her heart clicking for the last two weeks. PT has been apprehensive about being seen be 131/64, 67 Hr, 98.2 oral, . PT has hx of stents and HTN. 20g LAC. Dr Laure Gomes at bedside during triage. Pt sts \"I've been hearing the clicks and I couldn't tell tonight if it was my mouth \". Pt complains of diaphoresis with \"clicking\" episodes. PT denies pacemaker. Pt also complains of pain in right arm and shoulder. Pt complains of urinary frequency as well. Pt was supposed to follow up with her cardiologist after this issue was addressed with her PCP, but did not follow up. Pt denies exposure to sick persons.  Pt denies cough and fever, pt confirms increased dyspnea with exertion \"since the start of the virus since I can't go to the [facility] to walk anymore\"

## 2020-07-25 NOTE — DISCHARGE INSTRUCTIONS
As we discussed, your testing shows no signs of any serious or life-threatening illness  It is important that you follow-up with your primary care physician as well as her cardiologist  Return to the ER for any new, worsening or life-threatening symptoms    Chest Pain: Care Instructions  Your Care Instructions     There are many things that can cause chest pain. Some are not serious and will get better on their own in a few days. But some kinds of chest pain need more testing and treatment. Your doctor may have recommended a follow-up visit in the next 8 to 12 hours. If you are not getting better, you may need more tests or treatment. Even though your doctor has released you, you still need to watch for any problems. The doctor carefully checked you, but sometimes problems can develop later. If you have new symptoms or if your symptoms do not get better, get medical care right away. If you have worse or different chest pain or pressure that lasts more than 5 minutes or you passed out (lost consciousness), [id number] or seek other emergency help right away. A medical visit is only one step in your treatment. Even if you feel better, you still need to do what your doctor recommends, such as going to all suggested follow-up appointments and taking medicines exactly as directed. This will help you recover and help prevent future problems. How can you care for yourself at home? · Rest until you feel better. · Take your medicine exactly as prescribed. Call your doctor if you think you are having a problem with your medicine. · Do not drive after taking a prescription pain medicine. When should you call for help? [id number]:   · You passed out (lost consciousness). · You have severe difficulty breathing. · You have symptoms of a heart attack. These may include:  ? Chest pain or pressure, or a strange feeling in your chest.  ? Sweating. ? Shortness of breath. ? Nausea or vomiting.   ? Pain, pressure, or a strange feeling in your back, neck, jaw, or upper belly or in one or both shoulders or arms. ? Lightheadedness or sudden weakness. ? A fast or irregular heartbeat. After you call 911, the  may tell you to chew 1 adult-strength or 2 to 4 low-dose aspirin. Wait for an ambulance. Do not try to drive yourself. Call your doctor today if:   · You have any trouble breathing. · Your chest pain gets worse. · You are dizzy or lightheaded, or you feel like you may faint. · You are not getting better as expected. · You are having new or different chest pain. Where can you learn more? Go to http://moisés-barbara.info/  Enter A120 in the search box to learn more about \"Chest Pain: Care Instructions. \"  Current as of: June 26, 2019               Content Version: 12.5  © 6795-3206 [employer]. Care instructions adapted under license by [employer] (which disclaims liability or warranty for this information). If you have questions about a medical condition or this instruction, always ask your healthcare professional. [place] [address] any warranty or liability for your use of this information.

## 2020-07-25 NOTE — ED PROVIDER NOTES
Patient presents to the ER complaint of \"a clicking sensation in her left part of her chest\". States she has noticed this been recurrent over the past couple weeks. States she initially thought this could be potentially related to her dentures. States she had an episode tonight where the clicking became very prominent. States she frequently gets episodes where she gets very sweaty and hot. But denies any chest pain or shortness of breath. States she also has been experiencing increased urinary frequency to started last evening. Denies any vomiting. Denies any cough. The history is provided by the patient. Chest Pain    This is a recurrent problem. The current episode started more than 2 days ago. The problem has not changed since onset. The patient is experiencing no pain. Associated symptoms include malaise/fatigue, nausea and weakness. Pertinent negatives include no back pain, no fever, no leg pain and no vomiting.         Past Medical History:   Diagnosis Date    Chronic constipation     Colon cancer ([facility] [address] 1992    In Situ    Coronary atherosclerosis of native coronary artery 6/11/2013    stent placement    Hyperlipidemia LDL goal <70     Hypertension     controlled with med    Menopause     Mild diastolic dysfunction 31/93/1958    Per ECHO    Mitral regurgitation 02/18/2009    Mild to Moderate Per ECHO    OA (osteoarthritis) 6/11/2013    PUD (peptic ulcer disease) 1980's    Urge incontinence     Vitamin D deficiency        Past Surgical History:   Procedure Laterality Date    COLONOSCOPY N/A 3/10/2017    COLONOSCOPY  BMI 28 performed by Ashlee Rodriguez MD at [address] [place] [address]  4/2012 & 6/2019    HX HEMORRHOIDECTOMY  1970    HX HIP REPLACEMENT Right 11/13/2014    HX ORTHOPAEDIC Left     Hammer Toe Repair/Reconstruction    HX TUBAL LIGATION           Family History:   Problem Relation Age of Onset    Stroke Father 80    Breast Cancer Sister     Cancer Sister     Cancer Sister     Hypertension Mother     Cancer Mother         Colon    Breast Cancer Sister     Alzheimer Brother     Cancer Brother     Diabetes Sister     Dementia Brother     Seizures Brother     Alcohol abuse Brother     Alcohol abuse Brother     Coronary Artery Disease Brother     Arthritis-osteo Brother     Seizures Brother     No Known Problems Maternal Grandmother     Breast Cancer Daughter        Social History     Socioeconomic History    Marital status:      Spouse name: Not on file    Number of children: Not on file    Years of education: Not on file    Highest education level: Not on file   Occupational History    Not on file   Social Needs    Financial resource strain: Not on file    Food insecurity     Worry: Not on file     Inability: Not on file   [employer] needs     Medical: Not on file     Non-medical: Not on file   Tobacco Use    Smoking status: Never Smoker    Smokeless tobacco: Never Used   Substance and Sexual Activity    Alcohol use: No    Drug use: Yes    Sexual activity: Not on file   Lifestyle    Physical activity     Days per week: Not on file     Minutes per session: Not on file    Stress: Not on file   Relationships    Social connections     Talks on phone: Not on file     Gets together: Not on file     Attends Synagogue service: Not on file     Active member of club or organization: Not on file     Attends meetings of clubs or organizations: Not on file     Relationship status: Not on file    Intimate partner violence     Fear of current or ex partner: Not on file     Emotionally abused: Not on file     Physically abused: Not on file     Forced sexual activity: Not on file   Other Topics Concern    Not on file   Social History Narrative    Not on file         ALLERGIES: Penicillins; Plavix [clopidogrel]; Aspirin; Brilinta [ticagrelor]; Lipitor [atorvastatin]; Lyrica [pregabalin];  Polyethylene glycol ointment; Soy; Tramadol; and Voltaren [diclofenac sodium]    Review of Systems   Constitutional: Positive for malaise/fatigue. Negative for fever. HENT: Negative for congestion and dental problem. Respiratory: Negative for chest tightness. Cardiovascular: Positive for chest pain. Gastrointestinal: Positive for nausea. Negative for vomiting. Genitourinary: Negative for flank pain and frequency. Musculoskeletal: Negative for back pain and gait problem. Skin: Negative for color change and pallor. Neurological: Positive for weakness. Negative for syncope, speech difficulty and light-headedness. All other systems reviewed and are negative. There were no vitals filed for this visit. Physical Exam  Vitals signs and nursing note reviewed. Constitutional:       Appearance: Normal appearance. HENT:      Head: Normocephalic and atraumatic. Eyes:      Extraocular Movements: Extraocular movements intact. Conjunctiva/sclera: Conjunctivae normal.      Pupils: Pupils are equal, round, and reactive to light. Neck:      Musculoskeletal: Normal range of motion and neck supple. Cardiovascular:      Rate and Rhythm: Normal rate and regular rhythm. Pulses: Normal pulses. Heart sounds: Normal heart sounds. Pulmonary:      Effort: Pulmonary effort is normal.      Breath sounds: Normal breath sounds. Abdominal:      General: Abdomen is flat. Bowel sounds are normal.      Palpations: Abdomen is soft. Musculoskeletal: Normal range of motion. General: No tenderness. Skin:     General: Skin is warm and dry. Capillary Refill: Capillary refill takes less than 2 seconds. Neurological:      General: No focal deficit present. Mental Status: She is alert. Mental status is at baseline. MDM  Number of Diagnoses or Management Options  Diagnosis management comments: Low suspicion for any emergent pathology given reported \"clicking sensation\".   Patient does have coronary disease will obtain chest x-ray, EKG basic labs. Also obtain urinalysis given reported urinary symptoms. 11:05 PM  Stable EKG. Normal basic labs including normal cardiac enzymes. Urinalysis is negative for signs of infection gross hematuria   Discussed with patient results of her testing, no signs of any emergent or life-threatening pathology    She still continues to report \"clicking sensation\". This may be related to her anxiety nevertheless she likely needs follow-up with her PCP and cardiology. Amount and/or Complexity of Data Reviewed  Clinical lab tests: ordered and reviewed  Tests in the radiology section of CPT®: ordered and reviewed    Risk of Complications, Morbidity, and/or Mortality  Presenting problems: moderate  Diagnostic procedures: low  Management options: moderate    Patient Progress  Patient progress: stable         Procedures               Results Include:    Recent Results (from the past 24 hour(s))   CBC WITH AUTOMATED DIFF    Collection Time: 07/24/20  9:51 PM   Result Value Ref Range    WBC 4.2 (L) 4.3 - 11.1 K/uL    RBC 4.15 4.05 - 5.2 M/uL    HGB 13.2 11.7 - 15.4 g/dL    HCT 40.4 35.8 - 46.3 %    MCV 97.3 79.6 - 97.8 FL    MCH 31.8 26.1 - 32.9 PG    MCHC 32.7 31.4 - 35.0 g/dL    RDW 13.1 11.9 - 14.6 %    PLATELET 082 030 - 894 K/uL    MPV 10.8 9.4 - 12.3 FL    ABSOLUTE NRBC 0.00 0.0 - 0.2 K/uL    DF AUTOMATED      NEUTROPHILS 42 (L) 43 - 78 %    LYMPHOCYTES 49 (H) 13 - 44 %    MONOCYTES 8 4.0 - 12.0 %    EOSINOPHILS 1 0.5 - 7.8 %    BASOPHILS 1 0.0 - 2.0 %    IMMATURE GRANULOCYTES 0 0.0 - 5.0 %    ABS. NEUTROPHILS 1.7 1.7 - 8.2 K/UL    ABS. LYMPHOCYTES 2.0 0.5 - 4.6 K/UL    ABS. MONOCYTES 0.3 0.1 - 1.3 K/UL    ABS. EOSINOPHILS 0.1 0.0 - 0.8 K/UL    ABS. BASOPHILS 0.0 0.0 - 0.2 K/UL    ABS. IMM.  GRANS. 0.0 0.0 - 0.5 K/UL   METABOLIC PANEL, COMPREHENSIVE    Collection Time: 07/24/20  9:51 PM   Result Value Ref Range    Sodium 141 136 - 145 mmol/L    Potassium 3.9 3.5 - 5.1 mmol/L Chloride 106 98 - 107 mmol/L    CO2 29 21 - 32 mmol/L    Anion gap 6 (L) 7 - 16 mmol/L    Glucose 98 65 - 100 mg/dL    BUN 17 8 - 23 MG/DL    Creatinine 0.85 0.6 - 1.0 MG/DL    GFR est AA >60 >60 ml/min/1.73m2    GFR est non-AA >60 >60 ml/min/1.73m2    Calcium 8.8 8.3 - 10.4 MG/DL    Bilirubin, total 0.1 (L) 0.2 - 1.1 MG/DL    ALT (SGPT) 28 12 - 65 U/L    AST (SGOT) 20 15 - 37 U/L    Alk. phosphatase 152 (H) 50 - 136 U/L    Protein, total 7.8 6.3 - 8.2 g/dL    Albumin 3.5 3.2 - 4.6 g/dL    Globulin 4.3 (H) 2.3 - 3.5 g/dL    A-G Ratio 0.8 (L) 1.2 - 3.5     TROPONIN-HIGH SENSITIVITY    Collection Time: 07/24/20  9:51 PM   Result Value Ref Range    Troponin-High Sensitivity 6.0 0 - 14 pg/mL   MAGNESIUM    Collection Time: 07/24/20  9:51 PM   Result Value Ref Range    Magnesium 2.1 1.8 - 2.4 mg/dL   URINALYSIS W/ RFLX MICROSCOPIC    Collection Time: 07/24/20 10:05 PM   Result Value Ref Range    Color YELLOW      Appearance CLEAR      Specific gravity 1.005 1.001 - 1.023      pH (UA) 7.0 5.0 - 9.0      Protein Negative NEG mg/dL    Glucose Negative mg/dL    Ketone Negative NEG mg/dL    Bilirubin Negative NEG      Blood Negative NEG      Urobilinogen 0.2 0.2 - 1.0 EU/dL    Nitrites Negative NEG      Leukocyte Esterase TRACE (A) NEG      WBC 3-5 0 /hpf    RBC 5-10 0 /hpf    Epithelial cells 0-3 0 /hpf    Bacteria 0 0 /hpf    Casts 0-3 0 /lpf     Voice dictation software was used during the making of this note. This software is not perfect and grammatical and other typographical errors may be present. This note has been proofread, but may still contain errors.   Evy Salamanca MD; 7/24/2020 @11:06 PM   ===================================================================

## 2020-07-25 NOTE — ED NOTES
Patient states that it hurts before she has to urinate. States that she feels a clicking noise.  Patient states that she has shoulder pain

## 2020-10-16 ENCOUNTER — HOSPITAL ENCOUNTER (OUTPATIENT)
Dept: MAMMOGRAPHY | Age: 85
Discharge: HOME | End: 2020-10-16
Attending: PHYSICIAN ASSISTANT
Payer: MEDICARE

## 2020-10-16 DIAGNOSIS — Z12.31 ENCOUNTER FOR SCREENING MAMMOGRAM FOR BREAST CANCER: ICD-10-CM

## 2020-10-16 PROCEDURE — 77067 SCR MAMMO BI INCL CAD: CPT

## 2020-10-20 NOTE — PROGRESS NOTES
Mammogram is stable - no suspicious changes. Remind to do monthly self breast exams. Plan to repeat imaging in 1 year.

## 2020-11-03 ENCOUNTER — HOSPITAL ENCOUNTER (OUTPATIENT)
Dept: CT IMAGING | Age: 85
Discharge: HOME | End: 2020-11-03
Attending: PHYSICIAN ASSISTANT
Payer: MEDICARE

## 2020-11-03 DIAGNOSIS — H93.A9 PULSATILE TINNITUS: ICD-10-CM

## 2020-11-03 LAB — CREAT BLD-MCNC: 0.8 MG/DL (ref 0.8–1.5)

## 2020-11-03 PROCEDURE — 82565 ASSAY OF CREATININE: CPT

## 2020-11-03 PROCEDURE — 70481 CT ORBIT/EAR/FOSSA W/DYE: CPT

## 2020-11-03 PROCEDURE — 74011000258 HC RX REV CODE- 258: Performed by: PHYSICIAN ASSISTANT

## 2020-11-03 PROCEDURE — 74011000636 HC RX REV CODE- 636: Performed by: PHYSICIAN ASSISTANT

## 2020-11-03 RX ORDER — SODIUM CHLORIDE 0.9 % (FLUSH) 0.9 %
10 SYRINGE (ML) INJECTION
Status: COMPLETED | OUTPATIENT
Start: 2020-11-03 | End: 2020-11-03

## 2020-11-03 RX ADMIN — SODIUM CHLORIDE 100 ML: 900 INJECTION, SOLUTION INTRAVENOUS at 10:01

## 2020-11-03 RX ADMIN — Medication 10 ML: at 10:01

## 2020-11-03 RX ADMIN — IOPAMIDOL 100 ML: 755 INJECTION, SOLUTION INTRAVENOUS at 10:01

## 2021-09-01 PROBLEM — Z95.5 S/P CORONARY ARTERY STENT PLACEMENT: Status: ACTIVE | Noted: 2021-09-01

## 2021-09-01 PROBLEM — C18.9 MALIGNANT TUMOR OF COLON (HCC): Status: ACTIVE | Noted: 2017-05-20

## 2021-09-01 PROBLEM — E78.2 MIXED HYPERLIPIDEMIA: Status: ACTIVE | Noted: 2021-09-01

## 2021-09-01 PROBLEM — K57.30 DIVERTICULOSIS OF SIGMOID COLON: Status: ACTIVE | Noted: 2017-05-20

## 2021-10-27 PROBLEM — I35.1 NONRHEUMATIC AORTIC VALVE INSUFFICIENCY: Status: ACTIVE | Noted: 2021-10-27

## 2021-10-27 PROBLEM — I34.0 NONRHEUMATIC MITRAL VALVE REGURGITATION: Status: ACTIVE | Noted: 2021-10-27

## 2021-12-09 PROBLEM — C18.9 MALIGNANT TUMOR OF COLON (HCC): Status: RESOLVED | Noted: 2017-05-20 | Resolved: 2021-12-09

## 2022-03-18 PROBLEM — E78.2 MIXED HYPERLIPIDEMIA: Status: ACTIVE | Noted: 2021-09-01

## 2022-03-18 PROBLEM — F41.9 CHRONIC ANXIETY: Status: ACTIVE | Noted: 2017-01-19

## 2022-03-19 PROBLEM — R07.9 CHEST PAIN: Status: ACTIVE | Noted: 2019-06-05

## 2022-03-19 PROBLEM — Z95.5 S/P CORONARY ARTERY STENT PLACEMENT: Status: ACTIVE | Noted: 2021-09-01

## 2022-03-19 PROBLEM — I34.0 NONRHEUMATIC MITRAL VALVE REGURGITATION: Status: ACTIVE | Noted: 2021-10-27

## 2022-03-19 PROBLEM — K57.30 DIVERTICULOSIS OF SIGMOID COLON: Status: ACTIVE | Noted: 2017-05-20

## 2022-03-19 PROBLEM — I35.1 NONRHEUMATIC AORTIC VALVE INSUFFICIENCY: Status: ACTIVE | Noted: 2021-10-27

## 2022-03-19 PROBLEM — F32.A DEPRESSION: Status: ACTIVE | Noted: 2017-01-19

## 2022-03-19 PROBLEM — K21.9 GERD (GASTROESOPHAGEAL REFLUX DISEASE): Status: ACTIVE | Noted: 2017-01-19

## 2022-06-01 DIAGNOSIS — I10 ESSENTIAL HYPERTENSION: ICD-10-CM

## 2022-06-01 DIAGNOSIS — E55.9 VITAMIN D DEFICIENCY: Primary | ICD-10-CM

## 2022-06-01 DIAGNOSIS — E78.5 HYPERLIPIDEMIA LDL GOAL <100: ICD-10-CM

## 2022-06-01 DIAGNOSIS — I25.119 ATHEROSCLEROSIS OF NATIVE CORONARY ARTERY OF NATIVE HEART WITH ANGINA PECTORIS (HCC): ICD-10-CM

## 2022-06-02 ENCOUNTER — NURSE ONLY (OUTPATIENT)
Dept: INTERNAL MEDICINE CLINIC | Facility: CLINIC | Age: 87
End: 2022-06-02

## 2022-06-02 DIAGNOSIS — E55.9 VITAMIN D DEFICIENCY: ICD-10-CM

## 2022-06-02 DIAGNOSIS — I25.119 ATHEROSCLEROSIS OF NATIVE CORONARY ARTERY OF NATIVE HEART WITH ANGINA PECTORIS (HCC): ICD-10-CM

## 2022-06-02 DIAGNOSIS — E78.5 HYPERLIPIDEMIA LDL GOAL <100: ICD-10-CM

## 2022-06-02 DIAGNOSIS — I10 ESSENTIAL HYPERTENSION: ICD-10-CM

## 2022-06-02 LAB
25(OH)D3 SERPL-MCNC: 44.9 NG/ML (ref 30–100)
ALBUMIN SERPL-MCNC: 3.8 G/DL (ref 3.2–4.6)
ALBUMIN/GLOB SERPL: 1.2 {RATIO} (ref 1.2–3.5)
ALP SERPL-CCNC: 119 U/L (ref 50–136)
ALT SERPL-CCNC: 75 U/L (ref 12–65)
ANION GAP SERPL CALC-SCNC: 7 MMOL/L (ref 7–16)
AST SERPL-CCNC: 48 U/L (ref 15–37)
BASOPHILS # BLD: 0 K/UL (ref 0–0.2)
BASOPHILS NFR BLD: 0 % (ref 0–2)
BILIRUB SERPL-MCNC: 0.5 MG/DL (ref 0.2–1.1)
BUN SERPL-MCNC: 14 MG/DL (ref 8–23)
CALCIUM SERPL-MCNC: 9.2 MG/DL (ref 8.3–10.4)
CHLORIDE SERPL-SCNC: 107 MMOL/L (ref 98–107)
CHOLEST SERPL-MCNC: 165 MG/DL
CO2 SERPL-SCNC: 27 MMOL/L (ref 21–32)
CREAT SERPL-MCNC: 0.8 MG/DL (ref 0.6–1)
DIFFERENTIAL METHOD BLD: ABNORMAL
EOSINOPHIL # BLD: 0 K/UL (ref 0–0.8)
EOSINOPHIL NFR BLD: 1 % (ref 0.5–7.8)
ERYTHROCYTE [DISTWIDTH] IN BLOOD BY AUTOMATED COUNT: 13.5 % (ref 11.9–14.6)
GLOBULIN SER CALC-MCNC: 3.1 G/DL (ref 2.3–3.5)
GLUCOSE SERPL-MCNC: 69 MG/DL (ref 65–100)
HCT VFR BLD AUTO: 41.3 % (ref 35.8–46.3)
HDLC SERPL-MCNC: 83 MG/DL (ref 40–60)
HDLC SERPL: 2 {RATIO}
HGB BLD-MCNC: 13 G/DL (ref 11.7–15.4)
IMM GRANULOCYTES # BLD AUTO: 0 K/UL (ref 0–0.5)
IMM GRANULOCYTES NFR BLD AUTO: 0 % (ref 0–5)
LDLC SERPL CALC-MCNC: 72.4 MG/DL
LYMPHOCYTES # BLD: 1.2 K/UL (ref 0.5–4.6)
LYMPHOCYTES NFR BLD: 32 % (ref 13–44)
MCH RBC QN AUTO: 31.9 PG (ref 26.1–32.9)
MCHC RBC AUTO-ENTMCNC: 31.5 G/DL (ref 31.4–35)
MCV RBC AUTO: 101.5 FL (ref 79.6–97.8)
MONOCYTES # BLD: 0.3 K/UL (ref 0.1–1.3)
MONOCYTES NFR BLD: 6 % (ref 4–12)
NEUTS SEG # BLD: 2.4 K/UL (ref 1.7–8.2)
NEUTS SEG NFR BLD: 61 % (ref 43–78)
NRBC # BLD: 0 K/UL (ref 0–0.2)
PLATELET # BLD AUTO: 227 K/UL (ref 150–450)
PMV BLD AUTO: 11.4 FL (ref 9.4–12.3)
POTASSIUM SERPL-SCNC: 4.3 MMOL/L (ref 3.5–5.1)
PROT SERPL-MCNC: 6.9 G/DL (ref 6.3–8.2)
RBC # BLD AUTO: 4.07 M/UL (ref 4.05–5.2)
SODIUM SERPL-SCNC: 141 MMOL/L (ref 136–145)
TRIGL SERPL-MCNC: 48 MG/DL (ref 35–150)
VLDLC SERPL CALC-MCNC: 9.6 MG/DL (ref 6–23)
WBC # BLD AUTO: 3.9 K/UL (ref 4.3–11.1)

## 2022-06-09 ENCOUNTER — OFFICE VISIT (OUTPATIENT)
Dept: INTERNAL MEDICINE CLINIC | Facility: CLINIC | Age: 87
End: 2022-06-09
Payer: COMMERCIAL

## 2022-06-09 VITALS
TEMPERATURE: 97.8 F | SYSTOLIC BLOOD PRESSURE: 100 MMHG | OXYGEN SATURATION: 99 % | DIASTOLIC BLOOD PRESSURE: 70 MMHG | BODY MASS INDEX: 22.38 KG/M2 | WEIGHT: 114 LBS | HEIGHT: 60 IN | HEART RATE: 72 BPM

## 2022-06-09 DIAGNOSIS — E78.5 HYPERLIPIDEMIA LDL GOAL <100: ICD-10-CM

## 2022-06-09 DIAGNOSIS — R74.8 ELEVATED LIVER ENZYMES: ICD-10-CM

## 2022-06-09 DIAGNOSIS — I10 ESSENTIAL HYPERTENSION: Primary | ICD-10-CM

## 2022-06-09 DIAGNOSIS — M62.838 MUSCLE SPASM: ICD-10-CM

## 2022-06-09 DIAGNOSIS — I25.119 ATHEROSCLEROSIS OF NATIVE CORONARY ARTERY OF NATIVE HEART WITH ANGINA PECTORIS (HCC): ICD-10-CM

## 2022-06-09 DIAGNOSIS — N39.41 URGE INCONTINENCE: ICD-10-CM

## 2022-06-09 DIAGNOSIS — E55.9 VITAMIN D DEFICIENCY: ICD-10-CM

## 2022-06-09 DIAGNOSIS — R71.8 ELEVATED MCV: ICD-10-CM

## 2022-06-09 DIAGNOSIS — R53.83 FATIGUE, UNSPECIFIED TYPE: ICD-10-CM

## 2022-06-09 DIAGNOSIS — K59.09 CHRONIC CONSTIPATION: ICD-10-CM

## 2022-06-09 DIAGNOSIS — R63.4 WEIGHT LOSS: ICD-10-CM

## 2022-06-09 PROCEDURE — 99215 OFFICE O/P EST HI 40 MIN: CPT | Performed by: PHYSICIAN ASSISTANT

## 2022-06-09 PROCEDURE — 1123F ACP DISCUSS/DSCN MKR DOCD: CPT | Performed by: PHYSICIAN ASSISTANT

## 2022-06-09 RX ORDER — MAGNESIUM OXIDE 420 MG
1 TABLET ORAL NIGHTLY
COMMUNITY
End: 2022-07-08 | Stop reason: ALTCHOICE

## 2022-06-09 ASSESSMENT — ENCOUNTER SYMPTOMS
CONSTIPATION: 1
ABDOMINAL PAIN: 1
SHORTNESS OF BREATH: 1

## 2022-06-09 NOTE — PROGRESS NOTES
Ruy Robledo (:  1935) is a 80 y.o. female,Established patient, here for evaluation of the following chief complaint(s):  Follow-up (Hypertension, Hyperlipidemia), Spasms, and Constipation         ASSESSMENT/PLAN:  1. Essential hypertension  2. Urge incontinence  3. Atherosclerosis of native coronary artery of native heart with angina pectoris ([address]  4. Hyperlipidemia LDL goal <100  5. Vitamin D deficiency  6. Chronic constipation  -     TSH; Future  7. Muscle spasm  8. Weight loss  -     TSH; Future  9. Elevated MCV  -     CBC with Auto Differential; Future  -     Vitamin B12; Future  -     Folate; Future  10. Elevated liver enzymes  -     Hepatic Function Panel; Future  11. Fatigue, unspecified type  -     CBC with Auto Differential; Future  -     Vitamin B12; Future  -     Folate; Future      Patient Instructions   Temporary trial off Zetia to see what/if any symptoms improve  Discontinue OTC magnesium supplement  Advised Gentle Move (stool softener) x 2 nightly (this already has enough magnesium in it that the other magnesium supplement is not needed)  Utilize Lower Bowel Stimulator only as needed to avoid bowels getting dependent on the laxative effect  Emphasized the importance of staying well hydrated with water and eating high fiber foods throughout the day  Reminded that she will need to call cardiologist to request refills of Isosorbide and schedule overdue follow-up appointment with him  Continue chronic medications as prescribed  Monitor blood pressure 2-4 times/month and keep record to bring to next appointment        Return in about 4 weeks (around 2022). Subjective   SUBJECTIVE/OBJECTIVE:  HPI  The patient is a 80 y.o. female who is seen for evaluation of hyperlipidemia. She was tested because of hyperlipidemia w/ LDL goal < 100 + CAD.   The current state of this condition is no significant medication side effects noted and well controlled on Zetia 10 mg daily - taking as prescribed, adverse effects: muscle spasms. Last Lipid Panel:   Lab Results   Component Value Date    CHOL 165 06/02/2022    CHOL 178 11/09/2021    CHOL 218 (H) 06/22/2021     Lab Results   Component Value Date    TRIG 48 06/02/2022    TRIG 48 11/09/2021    TRIG 39 06/22/2021     Lab Results   Component Value Date    HDL 83 (H) 06/02/2022    HDL 81 11/09/2021    HDL 85 06/22/2021     Lab Results   Component Value Date    LDLCALC 72.4 06/02/2022    LDLCALC 87 11/09/2021    LDLCALC 126 (H) 06/22/2021     Lab Results   Component Value Date    LABVLDL 9.6 06/02/2022    LABVLDL 13 06/25/2020    LABVLDL 9 12/09/2019    VLDL 10 11/09/2021    VLDL 7 06/22/2021    VLDL 10 01/13/2021     Lab Results   Component Value Date    CHOLHDLRATIO 2.0 06/02/2022         Patient is here for follow-up of vitamin d deficiency. The current state of this condition is no significant medication side effects noted and well controlled on OTC vitamin d3 1000 iu daily - taking as prescribed. Lab Results   Component Value Date    VITD25 44.9 06/02/2022        The patient is a 80 y.o. female who is seen for follow-up of hypertension. She is doing home exercises and walks short distances and is adherent to low salt diet. Daily caffiene intake: a known amount (none). Current medication regimen consists of: Norvasc 10 mg daily. Blood pressure is not measured at home. BP Readings from Last 3 Encounters:   06/09/22 100/70   12/09/21 110/62   10/27/21 120/80       Additional concerns addressed today include constipation despite magnesium supplement + stool softener or natural laxative. She has a long history of constipation complaints previously treated with Miralax which became expensive to by OTC and insurance would not cover. Latest colonoscopy was done in 2017 with melanosis coli, diverticulosis, anal stenosis, and several small polyps. She was evaluated by colorectal surgeon - Dr. Husam Lopez with normal anoscopy findings.   She states she no longer takes Myrbetriq due to constipating effects which were never reported to me but states that when she stopped taking it her stools improved. Review of Systems   Constitutional: Positive for fatigue and unexpected weight change (gradual decrease since October). Negative for appetite change. Respiratory: Positive for shortness of breath. Cardiovascular: Positive for chest pain (begins in L side of neck and refers down into chest), palpitations (occasional) and leg swelling (bilateral legs/feet). Gastrointestinal: Positive for abdominal pain and constipation. Genitourinary: Positive for enuresis, frequency and urgency. Musculoskeletal: Positive for myalgias (bilateral leg cramps). Neurological: Positive for dizziness and headaches. /70 (Site: Left Upper Arm, Position: Sitting, Cuff Size: Small Adult)   Pulse 72   Temp 97.8 °F (36.6 °C) (Temporal)   Ht 5' (1.524 m)   Wt 114 lb (51.7 kg)   SpO2 99%   BMI 22.26 kg/m²       Objective   Physical Exam  Constitutional:       Appearance: Normal appearance. HENT:      Head: Normocephalic and atraumatic. Eyes:      Conjunctiva/sclera: Conjunctivae normal.      Pupils: Pupils are equal, round, and reactive to light. Neck:      Vascular: No carotid bruit. Cardiovascular:      Rate and Rhythm: Normal rate and regular rhythm. Heart sounds: Murmur heard. Systolic murmur is present with a grade of 2/6. Pulmonary:      Effort: Pulmonary effort is normal.      Breath sounds: Normal breath sounds. Abdominal:      General: Bowel sounds are normal.      Palpations: Abdomen is soft. Tenderness: There is abdominal tenderness (generalized). Musculoskeletal:         General: Normal range of motion. Cervical back: Normal range of motion. Right lower leg: Edema (1+ pitting) present. Left lower leg: Edema (trace) present. Skin:     General: Skin is warm and dry.    Neurological:      Mental Status: She is alert and oriented to person, place, and time. Psychiatric:         Mood and Affect: Mood normal.         Behavior: Behavior normal.         Thought Content: Thought content normal.         Judgment: Judgment normal.            On this date 6/9/2022 I have spent 40 minutes reviewing previous notes, test results and face to face with the patient discussing the diagnosis and importance of compliance with the treatment plan as well as documenting on the day of the visit. An electronic signature was used to authenticate this note.     --Ling Ashford PA-C

## 2022-06-09 NOTE — PATIENT INSTRUCTIONS
Temporary trial off Zetia to see what/if any symptoms improve  Discontinue OTC magnesium supplement  Advised Gentle Move (stool softener) x 2 nightly (this already has enough magnesium in it that the other magnesium supplement is not needed)  Utilize Lower Bowel Stimulator only as needed to avoid bowels getting dependent on the laxative effect  Emphasized the importance of staying well hydrated with water and eating high fiber foods throughout the day  Reminded that she will need to call cardiologist to request refills of Isosorbide and schedule overdue follow-up appointment with him  Continue chronic medications as prescribed  Monitor blood pressure 2-4 times/month and keep record to bring to next appointment

## 2022-06-23 RX ORDER — ISOSORBIDE MONONITRATE 30 MG/1
30 TABLET, EXTENDED RELEASE ORAL DAILY
Qty: 90 TABLET | Refills: 3 | Status: SHIPPED | OUTPATIENT
Start: 2022-06-23

## 2022-06-23 NOTE — TELEPHONE ENCOUNTER
Requested Prescriptions     Signed Prescriptions Disp Refills    isosorbide mononitrate (IMDUR) 30 MG extended release tablet 90 tablet 3     Sig: Take 1 tablet by mouth daily     Authorizing Provider: Sakshi Brewer, [address] [place] [address]     Ordering User: Dell Woo

## 2022-07-08 ENCOUNTER — OFFICE VISIT (OUTPATIENT)
Dept: CARDIOLOGY CLINIC | Age: 87
End: 2022-07-08
Payer: COMMERCIAL

## 2022-07-08 VITALS
SYSTOLIC BLOOD PRESSURE: 130 MMHG | HEART RATE: 64 BPM | WEIGHT: 111.6 LBS | BODY MASS INDEX: 21.91 KG/M2 | DIASTOLIC BLOOD PRESSURE: 62 MMHG | HEIGHT: 60 IN

## 2022-07-08 DIAGNOSIS — I35.1 NONRHEUMATIC AORTIC VALVE INSUFFICIENCY: ICD-10-CM

## 2022-07-08 DIAGNOSIS — I25.118 ATHEROSCLEROSIS OF NATIVE CORONARY ARTERY OF NATIVE HEART WITH STABLE ANGINA PECTORIS (HCC): Primary | ICD-10-CM

## 2022-07-08 DIAGNOSIS — I10 PRIMARY HYPERTENSION: ICD-10-CM

## 2022-07-08 DIAGNOSIS — Z95.5 S/P CORONARY ARTERY STENT PLACEMENT: ICD-10-CM

## 2022-07-08 DIAGNOSIS — E78.2 MIXED HYPERLIPIDEMIA: ICD-10-CM

## 2022-07-08 DIAGNOSIS — I34.0 NONRHEUMATIC MITRAL VALVE REGURGITATION: ICD-10-CM

## 2022-07-08 PROCEDURE — 99214 OFFICE O/P EST MOD 30 MIN: CPT | Performed by: INTERNAL MEDICINE

## 2022-07-08 PROCEDURE — 1123F ACP DISCUSS/DSCN MKR DOCD: CPT | Performed by: INTERNAL MEDICINE

## 2022-07-08 ASSESSMENT — ENCOUNTER SYMPTOMS
DOUBLE VISION: 0
HEMOPTYSIS: 0
HEMATEMESIS: 0
ABDOMINAL PAIN: 0
WHEEZING: 0
STRIDOR: 0
EYE REDNESS: 0
HEMATOCHEZIA: 0
HOARSE VOICE: 0

## 2022-07-08 NOTE — PROGRESS NOTES
[facility] CARDIOLOGY  [address] [facility], [address]  PHONE: [phone]          22    NAME:  Lisbeth Hendricks  : 1935  MRN: [id number]         SUBJECTIVE:   Lisbeth Hendricks is a 80 y.o. female seen for a visit regarding the following:     Chief Complaint   Patient presents with    Hypertension    Coronary Artery Disease    Valvular Heart Disease           HPI:    Cardiology problem list:   1. Coronary artery disease   - Diagonal stent in    -Coronary angiography on 2019 showed 80 to 90% distal RCA disease. Stented with a 3.5 x 18 mm Xience Janae drug-eluting stent   Left main was normal, proximal LAD had diffuse moderate atherosclerosis. Diagonal branch had previously a stent that was widely patent. -Mild irregularities noted in the proximal circumflex. Moderate disease was noted in the proximal first obtuse marginal.   EF was 65%. -Nuclear Stress test from 10/5/2021-EF 73%, normal perfusion.     -Echo from 10/14/2021 shows an EF of 60 to 65% mild aortic regurgitation, mild to moderate mitral regurgitation   2. Hypertension   3. Hyperlipidemia-statin intolerance( lipitor and crestor)   4. PACs-atrial bigeminy noted on EKG from 2021   5. Aortic regurgitation-mild from echo 2021   6. Mitral regurgitation-mild to moderate from echo 2021         I saw Ms. Deb Dunn who is a pleasant 59-year-old -American woman in cardiovascular follow-up on 2022, we last saw her in follow-up on 10/27/2021 for coronary artery disease with previous PCI, stable angina, , hypertension, hyperlipidemia, aortic and mitral regurgitation.     -Nuclear stress test from 2021 showed no evidence of significant inducible ischemia with preserved LV function. The echo showed normal LV systolic function with an EF of 60 to 65%, no significant regional wall motion abnormalities, mild aortic and mild to moderate  mitral regurgitation.       Chest pain: Says her chest pain is much better. When she gets emotionally upset at this point, she does not have the chest tightness that she used to have before we started her on isosorbide. Already on amlodipine 10 mg once daily. Now on dual antianginal  therapy. Hypertension: Blood pressures have been well controlled even at home. Denies headaches or blurry vision. Denies significant lower extremity edema apart from minimal edema towards the end of the day. Edema resolves by the next morning with elevation of legs. Hyperlipidemia: Statin intolerance. Was recently started on Zetia and appears to be tolerating this well for now. Follow-up lipid profile showed significant improvement.      -Palpitations: Used to be a problem in the past but no complaints at this point related to her significant palpitations. Past Medical History, Past Surgical History, Family history, Social History, and Medications were all reviewed with the patient today and updated as necessary.      Allergies   Allergen Reactions    Clopidogrel Other (See Comments)     Joints ache    Penicillins Anaphylaxis    Aspirin Other (See Comments)     Stomach burn with uncoated aspirin; pt takes coated 81 mg aspirin daily    Diclofenac Sodium Other (See Comments)    Pregabalin Other (See Comments)     inflammation    Ticagrelor Other (See Comments)     Pt get bruising, dry mouth, cant sleep, constipation, itching    Tramadol Nausea Only    Atorvastatin Other (See Comments), Rash and Swelling     Joint swelling     Patient Active Problem List   Diagnosis    Arthritis, degenerative    Mixed hyperlipidemia    Chronic anxiety    IBS (irritable bowel syndrome)    HTN (hypertension)    Vitamin D deficiency    Nonrheumatic aortic valve insufficiency    Nonrheumatic mitral valve regurgitation    Chest pain    Diverticulosis of sigmoid colon    S/P coronary artery stent placement    Depression    GERD (gastroesophageal reflux disease)    Coronary atherosclerosis of native coronary artery     Past Medical History:   Diagnosis Date    Anal stenosis     Per GI    Aortic valve insufficiency     Chronic constipation     Colon cancer ([facility] [address] 1992    In Situ    Coronary atherosclerosis of native coronary artery 6/11/2013    stent placement    DDD (degenerative disc disease), cervical     Hyperlipidemia LDL goal <70     Hypertension     controlled with med    Menopause     Mild diastolic dysfunction 18/14/8624    Per ECHO    Mitral regurgitation 02/18/2009    Mild to Moderate Per ECHO    OA (osteoarthritis) 6/11/2013    PUD (peptic ulcer disease) 1980's    Urge incontinence     Vitamin D deficiency      Past Surgical History:   Procedure Laterality Date    COLONOSCOPY N/A 3/10/2017    COLONOSCOPY  BMI 28 performed by Nury Hill MD at [address]  4/2012 & 6/2019    HEMORRHOID SURGERY  1970    ORTHOPEDIC SURGERY Left     Hammer Toe Repair/Reconstruction    TOTAL HIP ARTHROPLASTY Right 11/13/2014    TUBAL LIGATION       Family History   Problem Relation Age of Onset    Cancer Sister     Cancer Mother         Colon    Breast Cancer Sister     Alzheimer's Disease Brother     Cancer Brother     Diabetes Sister     Osteoarthritis Sister     Dementia Brother     Seizures Brother     Alcohol Abuse Brother     Cancer Sister         Bone    Hypertension Mother     Alcohol Abuse Brother     Colon Cancer Brother     Coronary Art Dis Brother     Osteoarthritis Brother     Other Brother         PAD    Seizures Brother     No Known Problems Maternal Grandmother     No Known Problems Maternal Grandfather     Breast Cancer Daughter     Stroke Father 80    Breast Cancer Sister     Alcohol Abuse Sister      Social History     Tobacco Use    Smoking status: Never Smoker    Smokeless tobacco: Never Used   Substance Use Topics    Alcohol use: No     Current Outpatient Medications bruits  Cardiovascular: S1 and S2 heard, regular rate and rhythm, no significant murmurs rubs or gallops. Respiratory: Clear to auscultation bilaterally with no adventitious sounds, respirations are normal  Abdomen: Soft, nontender, nondistended, bowel sounds present. Extremities: No cyanosis clubbing or edema  Peripheral pulses: Bilateral radial artery pulses are palpated. Bilateral pedal pulses are well felt. Neuro: No facial droop and no gross focal motor deficits  Lymphatic: No significant cervical lymphadenopathy noted. Musculoskeletal: No significant redness or swelling noted in all exposed joints. Skin: No significant rashes noted the of the exposed regions. Medical problems and test results were reviewed with the patient today. No results found for this or any previous visit (from the past 672 hour(s)). Lab Results   Component Value Date/Time    CHOL 165 06/02/2022 11:10 AM    HDL 83 06/02/2022 11:10 AM    VLDL 10 11/09/2021 10:46 AM   ,hemoglobin, basic metabolic panel, No results found for: TSH, TSH2, TSH3 ,  Lab Results   Component Value Date/Time     06/02/2022 11:10 AM    K 4.3 06/02/2022 11:10 AM     06/02/2022 11:10 AM    CO2 27 06/02/2022 11:10 AM    BUN 14 06/02/2022 11:10 AM    GFRAA >60 06/02/2022 11:10 AM    GLOB 3.1 06/02/2022 11:10 AM    ALT 75 06/02/2022 11:10 AM    AST 48 06/02/2022 11:10 AM      Lab Results   Component Value Date    LDLCALC 72.4 06/02/2022      Lab Results   Component Value Date    CREATININE 0.80 06/02/2022      10/14/21    TRANSTHORACIC ECHOCARDIOGRAM (TTE) COMPLETE (CONTRAST/BUBBLE/3D PRN) 10/14/2021  2:19 PM (Final)    Narrative  This is a summary report. The complete report is available in the patient's medical record. If you cannot access the medical record, please contact the sending organization for a detailed fax or copy. · LV: Estimated LVEF is 60 - 65%.  Normal cavity size, wall thickness, systolic function (ejection fraction normal) and diastolic function. · AV: Mild aortic valve regurgitation is present. · TV: Mild tricuspid valve regurgitation is present. Right Ventricular Arterial Pressure (RVSP) is 21 mmHg. Pulmonary hypertension not suggested by Doppler findings. · MV: Mild to moderate mitral valve regurgitation is present. Signed by: Mary Lawson MD on 10/14/2021  2:19 PM       ASSESSMENT and PLAN    Atherosclerosis of native coronary artery of native heart with stable angina pectoris [facility])  -S/p previous PCI in 2012. Doing well with no complaints of any significant angina at this point unless she is emotionally upset-continue dual antianginal therapy with isosorbide and amlodipine at current dosing. Continue aspirin 81 mg daily. Nonrheumatic mitral valve regurgitation  -Mild mitral regurgitation noted on last echo    Nonrheumatic aortic valve insufficiency  -Mild aortic regurgitation on last echo-euvolemic otherwise on exam    Primary hypertension  -Well-controlled on current combination of amlodipine and isosorbide. Mixed hyperlipidemia  -Much better control with Zetia 10 mg daily. She said she quit taking it but I encouraged her to go back to taking it. It appears that she did not tolerate statin therapy well in the past with myalgias. S/P coronary artery stent placement  -Previous PCI in 2012-doing well at this point-on aspirin 81 mg daily. Nuclear stress test from 2011 with normal perfusion. Overall Impression  -Has multiple complaints but none of them cardiac. No complaints of any significant anginal chest discomfort in any way. Blood pressures and heart rates are well controlled on her current therapy. Occasional episodes of chest pain but only when she is emotionally upset but not otherwise. Zetia 10 mg daily has helped her numbers-lipids: Substantially and I believe she should continue it especially in view of coronary artery disease.     Return in about 6 months (around 1/8/2023) for cad, htn, hld. Thank you Sushil Hays for allowing us to participate in the care of your patient. If you have any further questions, please do not hesitate to contact us.   Sincerely,        Dominic Álvarez MD   7/8/2022

## 2022-07-12 ENCOUNTER — OFFICE VISIT (OUTPATIENT)
Dept: INTERNAL MEDICINE CLINIC | Facility: CLINIC | Age: 87
End: 2022-07-12
Payer: COMMERCIAL

## 2022-07-12 ENCOUNTER — NURSE ONLY (OUTPATIENT)
Dept: INTERNAL MEDICINE CLINIC | Facility: CLINIC | Age: 87
End: 2022-07-12

## 2022-07-12 VITALS
TEMPERATURE: 98.2 F | BODY MASS INDEX: 22.38 KG/M2 | HEART RATE: 72 BPM | SYSTOLIC BLOOD PRESSURE: 120 MMHG | WEIGHT: 114 LBS | DIASTOLIC BLOOD PRESSURE: 66 MMHG | HEIGHT: 60 IN | OXYGEN SATURATION: 98 %

## 2022-07-12 DIAGNOSIS — R53.83 FATIGUE, UNSPECIFIED TYPE: ICD-10-CM

## 2022-07-12 DIAGNOSIS — K59.09 CHRONIC CONSTIPATION: ICD-10-CM

## 2022-07-12 DIAGNOSIS — M62.838 MUSCLE SPASM: Primary | ICD-10-CM

## 2022-07-12 DIAGNOSIS — I25.119 ATHEROSCLEROSIS OF NATIVE CORONARY ARTERY OF NATIVE HEART WITH ANGINA PECTORIS (HCC): ICD-10-CM

## 2022-07-12 DIAGNOSIS — R74.8 ELEVATED LIVER ENZYMES: ICD-10-CM

## 2022-07-12 DIAGNOSIS — R71.8 ELEVATED MCV: ICD-10-CM

## 2022-07-12 DIAGNOSIS — E78.5 HYPERLIPIDEMIA LDL GOAL <100: ICD-10-CM

## 2022-07-12 DIAGNOSIS — R41.89 IMPAIRED COGNITIVE ABILITY: ICD-10-CM

## 2022-07-12 DIAGNOSIS — N39.41 URGE INCONTINENCE: ICD-10-CM

## 2022-07-12 DIAGNOSIS — R63.4 WEIGHT LOSS: ICD-10-CM

## 2022-07-12 LAB
ALBUMIN SERPL-MCNC: 3.9 G/DL (ref 3.2–4.6)
ALBUMIN/GLOB SERPL: 1.1 {RATIO} (ref 1.2–3.5)
ALP SERPL-CCNC: 155 U/L (ref 50–136)
ALT SERPL-CCNC: 58 U/L (ref 12–65)
AST SERPL-CCNC: 36 U/L (ref 15–37)
BASOPHILS # BLD: 0 K/UL (ref 0–0.2)
BASOPHILS NFR BLD: 0 % (ref 0–2)
BILIRUB DIRECT SERPL-MCNC: 0.1 MG/DL
BILIRUB SERPL-MCNC: 0.2 MG/DL (ref 0.2–1.1)
DIFFERENTIAL METHOD BLD: ABNORMAL
EOSINOPHIL # BLD: 0 K/UL (ref 0–0.8)
EOSINOPHIL NFR BLD: 1 % (ref 0.5–7.8)
ERYTHROCYTE [DISTWIDTH] IN BLOOD BY AUTOMATED COUNT: 13.4 % (ref 11.9–14.6)
FOLATE SERPL-MCNC: 18.4 NG/ML (ref 3.1–17.5)
GLOBULIN SER CALC-MCNC: 3.5 G/DL (ref 2.3–3.5)
HCT VFR BLD AUTO: 40.9 % (ref 35.8–46.3)
HGB BLD-MCNC: 13 G/DL (ref 11.7–15.4)
IMM GRANULOCYTES # BLD AUTO: 0 K/UL (ref 0–0.5)
IMM GRANULOCYTES NFR BLD AUTO: 1 % (ref 0–5)
LYMPHOCYTES # BLD: 1.1 K/UL (ref 0.5–4.6)
LYMPHOCYTES NFR BLD: 35 % (ref 13–44)
MCH RBC QN AUTO: 31.6 PG (ref 26.1–32.9)
MCHC RBC AUTO-ENTMCNC: 31.8 G/DL (ref 31.4–35)
MCV RBC AUTO: 99.5 FL (ref 79.6–97.8)
MONOCYTES # BLD: 0.3 K/UL (ref 0.1–1.3)
MONOCYTES NFR BLD: 8 % (ref 4–12)
NEUTS SEG # BLD: 1.7 K/UL (ref 1.7–8.2)
NEUTS SEG NFR BLD: 55 % (ref 43–78)
NRBC # BLD: 0 K/UL (ref 0–0.2)
PLATELET # BLD AUTO: 232 K/UL (ref 150–450)
PMV BLD AUTO: 11.8 FL (ref 9.4–12.3)
PROT SERPL-MCNC: 7.4 G/DL (ref 6.3–8.2)
RBC # BLD AUTO: 4.11 M/UL (ref 4.05–5.2)
TSH, 3RD GENERATION: 1.65 UIU/ML (ref 0.36–3.74)
VIT B12 SERPL-MCNC: 752 PG/ML (ref 193–986)
WBC # BLD AUTO: 3.1 K/UL (ref 4.3–11.1)

## 2022-07-12 PROCEDURE — 99213 OFFICE O/P EST LOW 20 MIN: CPT | Performed by: PHYSICIAN ASSISTANT

## 2022-07-12 PROCEDURE — 1123F ACP DISCUSS/DSCN MKR DOCD: CPT | Performed by: PHYSICIAN ASSISTANT

## 2022-07-12 ASSESSMENT — ENCOUNTER SYMPTOMS
BLOOD IN STOOL: 0
VOMITING: 0
DIARRHEA: 0
ABDOMINAL PAIN: 1
ABDOMINAL DISTENTION: 1
NAUSEA: 0
CONSTIPATION: 1
SHORTNESS OF BREATH: 1

## 2022-07-12 ASSESSMENT — PATIENT HEALTH QUESTIONNAIRE - PHQ9
SUM OF ALL RESPONSES TO PHQ QUESTIONS 1-9: 1
SUM OF ALL RESPONSES TO PHQ9 QUESTIONS 1 & 2: 1
SUM OF ALL RESPONSES TO PHQ QUESTIONS 1-9: 1
2. FEELING DOWN, DEPRESSED OR HOPELESS: 1
1. LITTLE INTEREST OR PLEASURE IN DOING THINGS: 0
SUM OF ALL RESPONSES TO PHQ QUESTIONS 1-9: 1
SUM OF ALL RESPONSES TO PHQ QUESTIONS 1-9: 1

## 2022-07-12 NOTE — PATIENT INSTRUCTIONS
Suggest trial on Red Yeast Rice 600 mg twice daily since she feels certain that Zetia was causing her muscle spasms and is intolerant to statins  Resume Myrbetriq 50 mg every other day for at least a month to see if her bladder control improves without aggravating her constipation  Continue chronic medications as prescribed  Will try to arrange a  to help coordinate meal delivery program for patient that is more sensitive to her low fat/low sodium diet requirements  Praised efforts to hydrate and urged to continue this  Continue chronic medications as prescribed

## 2022-07-12 NOTE — PROGRESS NOTES
Rama Rose (:  1935) is a 80 y.o. female,Established patient, here for evaluation of the following chief complaint(s):  Follow-up (Muscle Spasms) and Other (Lab Abnormalities)         ASSESSMENT/PLAN:  1. Muscle spasm  Comments:  Resolved with discontinuation of Zetia  2. Elevated MCV  3. Elevated liver enzymes  4. Urge incontinence  5. Chronic constipation  6. Impaired cognitive ability  -     [facility] - Care Coordination/Social Work - [facility] Management  7. Hyperlipidemia LDL goal <100  8. Atherosclerosis of native coronary artery of native heart with angina pectoris [facility])      Patient Instructions   Suggest trial on Red Yeast Rice 600 mg twice daily since she feels certain that Zetia was causing her muscle spasms and is intolerant to statins  Resume Myrbetriq 50 mg every other day for at least a month to see if her bladder control improves without aggravating her constipation  Continue chronic medications as prescribed  Will try to arrange a  to help coordinate meal delivery program for patient that is more sensitive to her low fat/low sodium diet requirements  Praised efforts to hydrate and urged to continue this  Continue chronic medications as prescribed          Return in about 5 months (around 2022) for routine f/u. Subjective   SUBJECTIVE/OBJECTIVE:  HPI  Patient is here for follow-up of muscle spasms. The current state of this condition is resolved with discontinuation of Zetia - not currently on medications for this problem. She did see cardiologist recently in follow up and they advised her to restart Zetia which she is hesitant to do and inquires about a supplement alternative that she could try. Patient is here for follow-up of chronic constipation. The current state of this condition is improved, no significant medication side effects noted and reasonably well controlled on OTC Gentle Move (stool softener) x 2 nightly - taking as prescribed.       Patient is here for follow-up of urge incontinence. The current state of this condition is poorly controlled - not taking Myrbetriq as prescribed, medication compliance problems: felt it slowed her bowels down further, but admits it did help improve urinary control. Additional concerns addressed today include need for / to help connect her to community resources for meal delivery programs. She describes significant struggles preparing her own meals due to forgetting stove is on and burning food or dropping things and getting frustrated and anxious while cooking. She was receiving Meals on Wheels but states she had to cancel because they were unable to work with her dietary needs to be low sodium & avoidant of red meats given heart disease history. Review of Systems   Constitutional: Positive for fatigue. Negative for unexpected weight change. Respiratory: Positive for shortness of breath (with moderate exertion). Cardiovascular: Positive for leg swelling (bilateral). Negative for chest pain and palpitations. Gastrointestinal: Positive for abdominal distention, abdominal pain (burning sensation in lower stomach) and constipation. Negative for blood in stool, diarrhea, nausea and vomiting. Endocrine:        Negative for hair loss. Musculoskeletal: Negative for myalgias (improved off Zetia). Neurological: Negative for dizziness and headaches (infrequently when she \"overdoes it\" or gets stressed out with raised BP). /66 (Site: Left Upper Arm, Position: Sitting, Cuff Size: Small Adult)   Pulse 72   Temp 98.2 °F (36.8 °C) (Temporal)   Ht 5' (1.524 m)   Wt 114 lb (51.7 kg)   SpO2 98%   BMI 22.26 kg/m²       Objective   Physical Exam  Constitutional:       Appearance: Normal appearance. HENT:      Head: Normocephalic and atraumatic. Eyes:      Conjunctiva/sclera: Conjunctivae normal.      Pupils: Pupils are equal, round, and reactive to light.    Neck:

## 2022-07-13 ENCOUNTER — CARE COORDINATION (OUTPATIENT)
Dept: CARE COORDINATION | Facility: CLINIC | Age: 87
End: 2022-07-13

## 2022-07-13 NOTE — CARE COORDINATION
DEVAUGHN CM left VM with pt regarding referral for meal delivery services. [facility] can accommodate low sodium but cant accommodate no red meat. Would only Q for [employer] if she is homebound. Don't see that she is in her MD notes.   Awaiting response to discuss further

## 2022-07-14 ENCOUNTER — CARE COORDINATION (OUTPATIENT)
Dept: CARE COORDINATION | Facility: CLINIC | Age: 87
End: 2022-07-14

## 2022-07-15 DIAGNOSIS — E78.5 HYPERLIPIDEMIA LDL GOAL <100: ICD-10-CM

## 2022-07-15 DIAGNOSIS — I25.119 ATHEROSCLEROSIS OF NATIVE CORONARY ARTERY OF NATIVE HEART WITH ANGINA PECTORIS (HCC): ICD-10-CM

## 2022-07-15 DIAGNOSIS — E55.9 VITAMIN D DEFICIENCY: ICD-10-CM

## 2022-07-15 DIAGNOSIS — R71.8 ELEVATED MCV: Primary | ICD-10-CM

## 2022-07-15 DIAGNOSIS — I10 ESSENTIAL HYPERTENSION: ICD-10-CM

## 2022-07-21 ENCOUNTER — CARE COORDINATION (OUTPATIENT)
Dept: CARE COORDINATION | Facility: CLINIC | Age: 87
End: 2022-07-21

## 2022-07-21 NOTE — CARE COORDINATION
DEVAUGHN CM left final VM with pt regarding referral for meal assistance. Will close referral if no call back is received within 5 business days.

## 2022-07-25 ENCOUNTER — OFFICE VISIT (OUTPATIENT)
Dept: ORTHOPEDIC SURGERY | Age: 87
End: 2022-07-25
Payer: COMMERCIAL

## 2022-07-25 VITALS — HEIGHT: 60 IN | WEIGHT: 114 LBS | BODY MASS INDEX: 22.38 KG/M2

## 2022-07-25 DIAGNOSIS — M79.672 BILATERAL FOOT PAIN: Primary | ICD-10-CM

## 2022-07-25 DIAGNOSIS — M25.561 PAIN IN BOTH KNEES, UNSPECIFIED CHRONICITY: ICD-10-CM

## 2022-07-25 DIAGNOSIS — M25.562 PAIN IN BOTH KNEES, UNSPECIFIED CHRONICITY: ICD-10-CM

## 2022-07-25 DIAGNOSIS — M19.071 PRIMARY OSTEOARTHRITIS OF RIGHT FOOT: ICD-10-CM

## 2022-07-25 DIAGNOSIS — M79.671 BILATERAL FOOT PAIN: Primary | ICD-10-CM

## 2022-07-25 PROCEDURE — 99204 OFFICE O/P NEW MOD 45 MIN: CPT | Performed by: ORTHOPAEDIC SURGERY

## 2022-07-25 PROCEDURE — L2397 SUSPENSION SLEEVE LOWER EXT: HCPCS | Performed by: ORTHOPAEDIC SURGERY

## 2022-07-25 PROCEDURE — 1123F ACP DISCUSS/DSCN MKR DOCD: CPT | Performed by: ORTHOPAEDIC SURGERY

## 2022-07-25 PROCEDURE — L1902 AFO ANKLE GAUNTLET PRE OTS: HCPCS | Performed by: ORTHOPAEDIC SURGERY

## 2022-07-25 RX ORDER — LIDOCAINE 5% 5 G/100G
CREAM TOPICAL
Qty: 45 G | Refills: 2 | Status: SHIPPED | OUTPATIENT
Start: 2022-07-25

## 2022-07-25 NOTE — PROGRESS NOTES
The patient was prescribed a Wraptor brace for the patient's rightfoot. The patient wears a size 7.5 shoe and I fitted the patient with a S brace. I explained how to fit the brace properly by pulling the lace tabs across top of foot first then under arch and lastly pulling the strap up firmly and attaching to the lateral Velcro strip. Thus forming a figure 8 across the ankle joint. Once the figure 8 is completed they are to secure the top (short circumferential) straps to help avoid the straps from loosening with normal wear. The patient was able to demonstrate proper fitting in office to ensure compliance with device and acknowledged satisfaction with current fit. The patient was also prescribed and given a Reparel ankle sleeve for the right foot, size MARCELA/MD.     Patient read and signed documenting they understand and agree to [facility] current DME return policy.
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 We explored treatment options. Her main concerns deal with her weakness in her legs felt to be related to her hips and knees  08/10/2022: Appointment with Dr. Alan Marcelo to address those issues  Her main lower extremity complaint for me deals with the leg neuralgia. She lists Lyrica as an allergy but does not ever recall taking it.   She reports being very sensitive to medication therefore hold Neurontin  I do feel she has some superficial peroneal nerve entrapment concerns that may in the future require surgery; however, she feels that her pain comes from her hip down into her leg    Regarding her right, she has rigid hindfoot arthritis that hopefully will be controlled with bracing  More probable need in the future for right triple arthrodesis, but she is concerned about doing that type of surgery at her age  Advanced medical imaging: No indication for MRI scan or CT scan    DME: Right: Lace up ankle brace: Reparel ankle sleeve  We discussed ankle/foot/leg care and protection  PT: No indication for foot or ankle PT  Orthotic/prosthetic: No indication for custom insoles       Medication - OTC meds prn: Prescribed:  Magne sports topical rub with frankincense and myrrh   Alpha Lipoic acid, Boswellia, Devil's claw, Turmeric-curcumin    Prescribed: Topical 5% Neurontin and Xylocaine to apply to the lateral superficial peroneal nerve area pain  We discussed Neurontin but she would like to hold neuropathic medication because she reports being sensitive to most medicine  She will make sure with her pharmacist or provider about the compatibility of the above-mentioned medication    Surgical discussion: Discussed future considerations for:  Right triple

## 2022-07-25 NOTE — LETTER
[employer]  Arthritis oral choices: Individual Turmeric-Curcumin; Hoffman Bucker; Boswellia                           -or-   Combination [employer] Turmeric strength for joints that includes all 3 listed above     Magne topical Sports:   Balm or liquid Spray with frankincense/myrrh      Nerve medication options:   CBD, Alpha Lipoic acid, Lion's ottoniel and any other recommended neuropathic medication            Immune/healing possibilities:  Echinacea, Elderberry    As Needed: Dead sea bath salts, Essential Oils, scar cream, Gout and cramping medications    The above list of recommended medications is only a starting point. Please allow the experts at [facility] to make the final recommendations. Before using any of these medications, please make sure that you have no concerns, senstivities or allergies to the listed ingredients in each bottle/container. Also, please check with your pharmacist or your primary care physician regarding any and all possible interactions with your other daily medications. [facility] Locations:   [address] [place] [address]            Sincerely,      Judith Schuler MD

## 2022-07-25 NOTE — LETTER
DME Patient Authorization Form    Name: Skyler Perry  : 1935  MRN: [id number]   Age: 80 y.o. Gender: female  Delivery Address: [facility]     Diagnosis:     ICD-10-CM    1. Bilateral foot pain  M79.671 XR ANKLE STANDARD BILATERAL    M79.672 XR FOOT LIMITED BILATERAL     Reparel Ankle Sleeve ()     Wraptor Ankle Brace ()     CANCELED: XR Knee Bilateral Standard      2. Pain in both knees, unspecified chronicity  M25.561 XR ANKLE STANDARD BILATERAL    M25.562 XR FOOT LIMITED BILATERAL     Reparel Ankle Sleeve ()     Wraptor Ankle Brace ()     CANCELED: XR Knee Bilateral Standard      3. Primary osteoarthritis of right foot  M19.071 Reparel Ankle Sleeve ()     Wraptor Ankle Brace ()           Requested DME:  Reparel Ankle Sleeve**AANKL ($30) X 1 - right  Wraptor Ankle Brace - -26 ($129.00) X 1 - right        Clinical Notes:     **Indicates non-covered items by insurance. Payment expected on date of service. Electronically signed by  Provider: Caty Finch MD  ______________________________ Date: 2022                             [facility] Tax ID # [id number]        Durable Medical Equipment and/or Orthotics Patient Consent     I understand that my physician has prescribed this medical supply as part of my treatment plan as a matter of Medical Necessity.  I understand that I have a choice in where I receive my prescribed orthopedic supplies and/or services.  I authorize [facility] to furnish this service/product and to provide my insurance carrier with any information requested in order to process for payment.  I instruct my insurance carrier to pay [facility] directly for these services/products.    I understand that my insurance carrier may deny payment for this supply because it is a non-covered item, deemed not medically necessary or considered experimental.   I understand that any cost not covered by my insurance carrier will be solely my financial responsibility.  I have received the Supplier Standards and have reviewed them.  I have received the prescribed item and have been fully instructed on the proper use of the above services/products.    ______ (Patient Initials) I understand that all DME items are non-returnable after being dispensed. Items still in sealed packaging may be returned up to 14 days after purchasing. [address] will replace items that are defective.    ______ (Patient Initials) I understand that [facility] will not file a claim with my insurance carrier for this service/product and I am waiving my right to file a claim on my own for this service/product with my insurance company as this item is NON-COVERED (Denoted by the **) by my Insurance company/policy. ______ (Patient Initials) I understand that I am responsible to bring my equipment to the hospital for any surgery. ______________________________________________  ________________________  Patient / Harlo Copper            Thank you for considering [address] Your physician has prescribed specific medical equipment or devices for your home use. The following describes your rights and responsibilities as our customer. Right to Choose Providers: You have a choice regarding which company supplies your home medical equipment and devices, and to consult your physician in this decision. You may choose a medical supply store, a home medical equipment provider, or a specialist such as [facility]. [facility] will coordinate with your physician to provide the medical equipment or devices prescribed for your home use.     Right to Service:  You have the right to considerate, respectful and nondiscriminatory care. You have the right to receive accurate and easily understood information about your health care. If you speak a foreign language, or don't understand the discussions, assistance will be provided to allow you to make informed health care decisions. You have the right to know your treatment options and to participate in decisions about your care, including the right to accept or refuse treatment. You have the right to expect a reasonable response to your requests for treatment or service. You have the right to talk in confidence with health care providers and to have your health care information protected. You have the right to receive an explanation of your bill. You have the right to complain about the service or product you receive. Patient Responsibilities:  Please provide complete and accurate information about your health insurance benefits and make arrangements for the timely payment of your bill. [facility] will, if possible, assume responsibility for billing your insurance (Medicare, Medicaid and commercial) for the prescribed equipment or devices. If your policy does not cover the prescribed product, or only covers a portion of the bill, you are responsible for any remaining balance. Return and Exchange Policy:  [facility] will honor published  Warranties for products. [facility] will accept returns or exchanges within 14 days from the date of receipt, providin) the product must be in new condition; 2) receipt as required; and 3) used disposable and hygiene products may only be returned due to a defective product. Note: Refunds will be issued in a timely manner, please allow 4-6 weeks for processing. Complaint Procedures and DME Consumer Protection Resources:  [facility] values you as a customer, and is committed to resolving patient concerns.   This commitment includes understanding and documenting your concerns, conducting a review of internal procedures, and operation. A supplier must maintain a primary business telephone listed under the name of the business in a [employer] or a toll free number available through directory assistance. The exclusive use of a beeper, answering machine or cell phone is prohibited. A supplier must have comprehensive liability insurance in the amount of at least $300,000 that covers both the supplier's place of business and all customers and employees of the supplier. If the supplier manufactures its own items, this insurance must also cover product liability and completed operations. A supplier must agree not to initiate telephone contact with beneficiaries, with a few exceptions allowed. This standard prohibits suppliers from calling beneficiaries in order to solicit new business. A supplier is responsible for delivery and must instruct beneficiaries on use of Medicare covered items, and maintain proof of delivery. A supplier must answer questions, and respond to complaints of the beneficiaries, and maintain documentation of such contacts. A supplier must maintain and replace at no charge or repair directly, or through a service contract with another company, Medicare covered items it has rented to beneficiaries. A supplier must accept returns of substandard (less than full quality for the particular item) or unsuitable items (inappropriate for the beneficiary at the time it was fitted and rented or sold) from beneficiaries. A supplier must disclose these supplier standards to each beneficiary to whom it supplies a Medicare-covered item. A supplier must disclose to the government any person having ownership, financial, or control interest in the supplier. A supplier must not convey or reassign a supplier number; i.e., the supplier may not sell or allow another entity to use its Medicare billing number.   A supplier must have a complaint resolution protocol established to address beneficiary complaints that relate to these standards. A record of these complaints must be maintained at the physical facility. Complaint records must include: the name, address, telephone number and health insurance claim number of the beneficiary, a summary of the complaint, and any action taken to resolve it. A supplier must agree to furnish CMS any information required by the Medicare statute and implementing regulations. A supplier of DMEPOS and other items and services must be accredited by a CMS-approved accreditation organization in order to receive and retain a supplier billing number. The accreditation must indicate the specific products and services, for which the supplier is accredited in order for the supplier to receive payment for those specific products and services. A DMEPOS supplier must notify their accreditation organization when a new DMEPOS location is opened. The accreditation organization may accredit the new supplier location for three months after it is operational without requiring a new site visit. All DMEPOS supplier locations, whether owned or subcontracted, must meet the [employer] and be separately accredited in order to bill Medicare. An accredited supplier may be denied enrollment or their enrollment may be revoked, if CMS determines that they are not in compliance with the DMEPOS quality standards. A DMEPOS supplier must disclose upon enrollment all products and services, including the addition of new product lines for which they are seeking accreditation. If a new product line is added after enrollment, the DMEPOS supplier will be responsible for notifying the accrediting body of the new product so that the DMEPOS supplier can be re-surveyed and accredited for these new products. Must meet the surety bond requirements specified in 42 C. F.R. 424.57(c). Implementation date- May 4, 2009. A supplier must obtain oxygen from a state-licensed oxygen supplier.   A supplier must maintain ordering and referring documentation consistent with provisions found in 42 C. F.R. 424.516(f). DMEPOS suppliers are prohibited from sharing a practice location with certain other Medicare providers and suppliers. DMEPOS suppliers must remain open to the public for a minimum of 30 hours per week with certain exceptions.

## 2022-07-29 ENCOUNTER — CARE COORDINATION (OUTPATIENT)
Dept: CARE COORDINATION | Facility: CLINIC | Age: 87
End: 2022-07-29

## 2022-08-10 ENCOUNTER — OFFICE VISIT (OUTPATIENT)
Dept: ORTHOPEDIC SURGERY | Age: 87
End: 2022-08-10
Payer: COMMERCIAL

## 2022-08-10 DIAGNOSIS — Z09 SURGERY FOLLOW-UP: ICD-10-CM

## 2022-08-10 DIAGNOSIS — M16.12 UNILATERAL PRIMARY OSTEOARTHRITIS, LEFT HIP: ICD-10-CM

## 2022-08-10 DIAGNOSIS — Z96.641 HISTORY OF TOTAL RIGHT HIP REPLACEMENT: ICD-10-CM

## 2022-08-10 DIAGNOSIS — M17.11 PRIMARY OSTEOARTHRITIS OF RIGHT KNEE: ICD-10-CM

## 2022-08-10 DIAGNOSIS — G89.29 CHRONIC BILATERAL LOW BACK PAIN, UNSPECIFIED WHETHER SCIATICA PRESENT: ICD-10-CM

## 2022-08-10 DIAGNOSIS — M25.551 RIGHT HIP PAIN: ICD-10-CM

## 2022-08-10 DIAGNOSIS — M70.72 BURSITIS OF LEFT HIP, UNSPECIFIED BURSA: ICD-10-CM

## 2022-08-10 DIAGNOSIS — M25.561 ACUTE PAIN OF RIGHT KNEE: Primary | ICD-10-CM

## 2022-08-10 DIAGNOSIS — M17.12 PRIMARY OSTEOARTHRITIS OF LEFT KNEE: ICD-10-CM

## 2022-08-10 DIAGNOSIS — M54.50 CHRONIC BILATERAL LOW BACK PAIN, UNSPECIFIED WHETHER SCIATICA PRESENT: ICD-10-CM

## 2022-08-10 DIAGNOSIS — M70.71 BURSITIS OF RIGHT HIP, UNSPECIFIED BURSA: ICD-10-CM

## 2022-08-10 PROCEDURE — 1123F ACP DISCUSS/DSCN MKR DOCD: CPT | Performed by: ORTHOPAEDIC SURGERY

## 2022-08-10 PROCEDURE — 99215 OFFICE O/P EST HI 40 MIN: CPT | Performed by: ORTHOPAEDIC SURGERY

## 2022-08-10 NOTE — PROGRESS NOTES
Name: Skyler Perry  YOB: 1935  Gender: female  MRN: [id number]    CC: Low back pain/bilateral hip/bilateral knee and leg pain    HPI: Skyler Perry is a 80 y.o. female who presents with bilateral hip and knee pain. She does have a history of right total hip arthroplasty to my care in 2014. She has been seen in the past for some low back and bursa symptoms on the right with what has been felt to be unstable and secure total of arthroplasty. She states that she is concerned she is starting to feel like her legs [place] out on her\". She describes pain in the lateral aspect of both hips extending into the anterolateral thigh on the left. She states her knees swell and hurt as well. She recently saw Dr. Arsenio Finch for her foot and was provided with a Raptor brace which she does not completely understand how to use. With these issues and advancing age comes in today for further recommendations and treatment. She does note she walks several days a week with her dog and she does use a cane primarily but has not used a walker to speak of. History was obtained from patient    ROS/Meds/PSH/PMH/FH/SH: I personally reviewed the patients standard intake form. Below are the pertinents    Tobacco:  reports that she has never smoked.  She has never used smokeless tobacco.  Past Medical History:   Diagnosis Date    Anal stenosis     Per GI    Aortic valve insufficiency     Chronic constipation     Colon cancer ([facility] [address] 1992    In Situ    Coronary atherosclerosis of native coronary artery 6/11/2013    stent placement    DDD (degenerative disc disease), cervical     Hyperlipidemia LDL goal <70     Hypertension     controlled with med    Menopause     Mild diastolic dysfunction 52/46/7683    Per ECHO    Mitral regurgitation 02/18/2009    Mild to Moderate Per ECHO    OA (osteoarthritis) 6/11/2013    PUD (peptic ulcer disease) 1980's    Urge incontinence     Vitamin D deficiency       Past Surgical History: Procedure Laterality Date    COLONOSCOPY N/A 3/10/2017    COLONOSCOPY  BMI 28 performed by Hernando Sanchez MD at . [address]  4/2012 & 6/2019    HEMORRHOID SURGERY  1970    ORTHOPEDIC SURGERY Left     Hammer Toe Repair/Reconstruction    TOTAL HIP ARTHROPLASTY Right 11/13/2014    TUBAL LIGATION          Physical Examination:  Physical exam: On examination of the patient's gait there is the patient uses a cane in the left hand appropriately. She does appear to be somewhat unsteady and insecure and certainly at risk for fall. On examination while standing there is decreased flexion in the lumbosacral spine without acute list or spasm. He is quite limited in range of motion lumbosacral spine with mild tenderness there. She is significantly tender over the trochanteric regions of both hips more so on the left than the right. While seated ,  the Right hip is examined there is full range of motion without obvious issue . On examination of the  Left hip there is decreased range of motion, there is trochanteric tenderness to direct palpation, and she does have some mild anterolateral groin discomfort at extremes of rotation. .     On examination of the  Bilateral knee :ROM is 0 to 120 There is a mild effusion. Patient is neurologically intact distally. Skin is intact. Imaging:   Radiographs: An AP pelvis and table down lateral the right hip was obtained  This demonstrated There is a well-positioned and well fixed total hip arthroplasty without sign of issue . Incidentally noted relatively significant degenerative joint disease of the left hip    Radiographic impression: Stable bone ingrown right total hip arthroplasty/marked DJD left HIP    Radiographs: An AP standing, skiers, flexion lateral, and sunrise view of the bilateral knees were obtained  This demonstrated  Marked medial joint space narrowing.     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 This is moderate to marked in nature. She additionally has significant degenerative joint disease in the left hip. She has a well-functioning right total hip arthroplasty. 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 I did discuss with the patient the numerous sources of pain and treatment options. We discussed nonsurgical measures including:Injection therapy, Physical Therapy, Activity Modification, Use of assistive device, and specifically I suggested that she see physical therapy for gait training as she does appear to be a fall risk. I broached the subject of her of use of a walker at times. We discussed activity and I have encouraged her to continue moderate activity as she tolerates with an assistive device. .     I counseled the patient regarding the difference between various injection therapies. I explained the role of steroid injections and focused on the use of steroids for more acute issues and flareups of arthritic and other concerns within the joint. Also counseled patient regarding the role of viscosupplementation. I counseled them about the use of viscosupplementation in more moderate issues and more chronic problems. Counseled about the way in which would be administered in the expectation in terms of outcome. We talked about the risks of injection therapy with viscosupplementation. We also discussed the definitive option of total Hip arthroplasty. She has a significantly advanced age and multiple pain generators and I am doubtful that at this time hip replacement surgery would really change her quality of life.   For this reason I suggest that we get her involved in therapy as described above that she try an assistive device, and that she see our back care providers to see if any injections into her back would be helpful. I did  her about viscosupplementation with the knees that might give her some relief and she is going to give that some thought. If she would like to proceed with injection she will let us know. Plan:       Follow up:   Return for Set Up [facility] 3 Visits, Back Care Provider.      Blanca Norwood MD

## 2022-08-12 ENCOUNTER — OFFICE VISIT (OUTPATIENT)
Dept: ORTHOPEDIC SURGERY | Age: 87
End: 2022-08-12
Payer: COMMERCIAL

## 2022-08-12 DIAGNOSIS — G89.29 CHRONIC PAIN OF BOTH KNEES: ICD-10-CM

## 2022-08-12 DIAGNOSIS — M25.562 CHRONIC PAIN OF BOTH KNEES: ICD-10-CM

## 2022-08-12 DIAGNOSIS — M62.81 MUSCLE WEAKNESS: ICD-10-CM

## 2022-08-12 DIAGNOSIS — M25.551 BILATERAL HIP PAIN: Primary | ICD-10-CM

## 2022-08-12 DIAGNOSIS — M25.552 BILATERAL HIP PAIN: Primary | ICD-10-CM

## 2022-08-12 DIAGNOSIS — M54.50 CHRONIC BILATERAL LOW BACK PAIN, UNSPECIFIED WHETHER SCIATICA PRESENT: ICD-10-CM

## 2022-08-12 DIAGNOSIS — G89.29 CHRONIC BILATERAL LOW BACK PAIN, UNSPECIFIED WHETHER SCIATICA PRESENT: ICD-10-CM

## 2022-08-12 DIAGNOSIS — Z74.09 IMPAIRED FUNCTIONAL MOBILITY, BALANCE, GAIT, AND ENDURANCE: ICD-10-CM

## 2022-08-12 DIAGNOSIS — M25.561 CHRONIC PAIN OF BOTH KNEES: ICD-10-CM

## 2022-08-12 PROCEDURE — 97162 PT EVAL MOD COMPLEX 30 MIN: CPT | Performed by: PHYSICAL THERAPIST

## 2022-08-12 PROCEDURE — 97110 THERAPEUTIC EXERCISES: CPT | Performed by: PHYSICAL THERAPIST

## 2022-08-15 ENCOUNTER — TELEPHONE (OUTPATIENT)
Dept: INTERNAL MEDICINE CLINIC | Facility: CLINIC | Age: 87
End: 2022-08-15

## 2022-08-15 ENCOUNTER — TELEPHONE (OUTPATIENT)
Dept: ORTHOPEDIC SURGERY | Age: 87
End: 2022-08-15

## 2022-08-15 ENCOUNTER — CLINICAL DOCUMENTATION (OUTPATIENT)
Dept: ORTHOPEDIC SURGERY | Age: 87
End: 2022-08-15

## 2022-08-15 NOTE — TELEPHONE ENCOUNTER
Pt called stating that she has stopped taking the red yeast rice because it was causing her to sweat, feel weak and fatigued and she wasn't sleeping well. She has been off of the med for about 2 weeks. She was taken off of Zetia previously to taking the red yeast rice due to weight loss. She has also stopped taking Myrbetriq because she believes it was causing constipation but she is still having issues with her bladder. Please advise.

## 2022-08-15 NOTE — TELEPHONE ENCOUNTER
Unfortunately, I have no other options for either condition to offer. She is welcome to try a lower dose of Myrbetriq to see if she could tolerate that better with her bowels but hopefully get some benefit for her bladder. It definitely won't be as effective as the 50 mg for her bladder. Yes, constipation can be a side effect of Myrbetriq but she struggles with constipation anyway to not sure we can blame the medication for causing her constipation.

## 2022-08-15 NOTE — TELEPHONE ENCOUNTER
Pt left voicemail requesting returned call. Pt stated that she was informed of $25 co pay per visit. Pt has concerns about ability to afford sessions. She cancelled appt for 6/41/95 (has a conflict) and will keep appt for 8/18/22 for now. Pt is awaiting a call from a  with [facility] regarding financial assistance. Pt also given number for [phone]  Silvino Cardoso regarding meal assistance.

## 2022-08-16 ENCOUNTER — CARE COORDINATION (OUTPATIENT)
Dept: CARE COORDINATION | Facility: CLINIC | Age: 87
End: 2022-08-16

## 2022-08-16 NOTE — CARE COORDINATION
Initial Contact Social Work Note - Ambulatory  8/16/2022      Date of referral: 8.15.22  Referral received from: PCP office  Reason for referral: community resources, meals    Previous  referral: Yes  If yes, brief summary of outcome: no returned calls to messages left after initial referral    Two Identifiers Verified: Yes    Insurance Provider: [employer], Medicaid denied in 2016    Support System:  Neighbor(s)     Status:     Community Providers: Meals on [employer] and applying for [employer] aid    ADL Assistance Needed:       Housing/Living Concerns or Home Modification Needs: none     Transportation Concern: none    Medication Cost Concern: none     Medication Adherence Concern: becoming less able to comply with medication schedule. Reports getting confused about what to take at times. Financial Concern(s): struggles to pay for medical bills-[employer] working with pt    Income (only if applicable): limited income    Ability to Read/Write: Yes    Advance Care Plan:   on file    Other: Pt lives alone. Reports difficulty with medication schedule. Needs meals but must comply with healthy diet    Identified Needs:  Pt needs someone in the home to assist with light chores, med mgmt, meals. Needs food delivery service    Social Work Plan:  Spoke with Giesl Guardado at [employer] who stated to just apply for CLTC and get that ball rolling-Electronic referral to [employer], [place] referral-specific diet requested but may not be adhered to by MOW  Next Steps: remain available over the next month for any other needs    Method of Communication With Provider (if appropriate): Chart Routing       Goals Addressed                   This Visit's Progress     Supportive resources in place to maintain patient in the community (ie.  Home Health, DME equipment, refer to, medication assistant plan, etc.)        Assist pt with MOW and CLTC aide

## 2022-08-16 NOTE — TELEPHONE ENCOUNTER
Pt notified and verbalized understanding. She does not want to try a different dosage of the Myrbetriq at this time.

## 2022-08-16 NOTE — CARE COORDINATION
Pt called DEVAUGHN BULL requesting that MOW referral be resended for now until she is finished with PT. Pt will update [employer]AUGH[employer] BULL when she would like for the application to be resubmitted.      [facility] aide referral confirmation #[id number]

## 2022-08-17 NOTE — PROGRESS NOTES
[facility] [address] [address]  Dept: [phone]      Physical Therapy Daily Note     Referring MD: Theresa Homans, MD  Diagnosis:     ICD-10-CM    1. Bilateral hip pain  M25.551     M25.552       2. Chronic pain of both knees  M25.561     M25.562     G89.29       3. Muscle weakness  M62.81       4. Impaired functional mobility, balance, gait, and endurance  Z74.09       5. 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 Pt had some L hip pain at that time that improved after R TOYA. Returned to Dr. Montez Sanchez 8/10/22 with report of L>R leg \"giving out\" and pain lateral aspects of both hips. Knees swell and hurt as well. Pt also complains of R toes drawing in, burning pain L>R lower leg. X-rays demonstrated stable bone ingrown R TOYA, marked DJD L hip, and marked DJD B knees. MD notes that biggest issue is likely degenerative disc disease and spondylosis lumbar spine with elements of spinal stenosis as she complains of weakness in the legs as well as numbness/burning sensation calves and feet. MD states pt is a fall risk and referred to PT for gait training and discussed use of a walker.  Pt referred to back specialist and will consider viscosupplementation. Pt prefers to use rubs. Describe current symptoms: poor balance, legs give way, B hip and knee pain, R ankle pain, L lower leg pain  Patient Stated Goals: walk better and use my limbs    Total Timed Procedure Codes: 40 min, Total Time: 45 min    SUBJECTIVE     Pt reports that she has not been as active and has less pain today. She notes mild pain left lower leg but minimal back, hip, and knee pain. Pt cannot pay her co-pay and would like to hold therapy after today. OBJECTIVE     Findings: Gait- entered and exited clinic with cane in R hand (lowered one notch) demonstrating good stability with symmetrical step length and adequate foot clearance    Treatment provided today:  Therapeutic exercise (61251) x 30 min to develop ROM, strength, endurance and flexibility of the trunk and LEs. Seated toe raises 2x10  Seated hip adduction squeeze 2x10  Standing heel raises 2x10  Bridge 2x10  Hooklying abdominal brace 2x10  Sidelying clamshell 2x10 B  Supine SLR 2x10 B  Sit-stand from standard chair with hands on knees 2x10, SBA  Updated HEP    Manual therapy (15832) x 10 min utilizing techniques to improve joint and/or soft tissue mobility, ROM, and function as well as helping to decrease pain/spasms and swelling. Palpation and assessment of soft tissue, muscles, and landmarks   STM B gluteals using tennis ball in sidelying    ASSESSMENT     Pt much more stable today and reports that she has been resting more. Pt has financial difficulties and some unexpected recent bills. Pt unable to attend therapy 2x/week and will need to reduce frequency. Pt did well with exercises today and will focus on home program development with less frequent therapy sessions to progress. PLAN     Complete HEP and return 9/1/22 for progression  Effective Dates: 8/12/2022 TO 10/11/2022 (60 days).       GOALS     Short term goals to be met by 9/9/2022  (4 weeks):  Patient will demonstrate good recall of HEP requiring no more than minimal verbal cuing for proper form and technique. Pt will increase L hip abduction strength to at least 3+/5 in order to increase stability with gait. Pt will transfer sit- 2 attempts without loss of balance on initial standing using BUE support. Pt will walk safely with walker for all community mobility and household mobility as able (space permitting). Pt will improve score on the Tinetti to at least 16/28 demonstrating improved safety with gait. Long term goals to be met by 10/11/2022  (8 weeks):  Patient will be compliant and independent with a comprehensive HEP and activity progression. Pt will increase strength in B hips with MMT to at least 4/5 (hip extension to 3/5) for improved stability with gait. Pt will transfer sit- 1 attempt without loss of balance on initial standing using BUE support. Patient will demonstrate independent supine to sit transfers using proper form and technique. Pt will improve score on the Tinetti to at least 20/28 demonstrating decreased risk of falling. [facility]  Access Code: [email or web address]  URL: [email or web address]  Date: 08/18/2022  Prepared by: Lori Astudillo    Exercises  Seated Toe Raise - 5 x weekly - 3 sets - 10 reps - 5 hold  Seated Hip Adduction Isometrics with Ball - 5 x weekly - 10 reps - 2 sets - 5 hold  Supine Transversus Abdominis Bracing - Hands on Stomach - 5 x weekly - 1 sets - 30 reps - 5 hold  Beginner Bridge - 5 x weekly - 2 sets - 10 reps - 5 hold  Clamshell - 5 x weekly - 2 sets - 10 reps - 3 hold  Supine Active Straight Leg Raise - 5 x weekly - 2 sets - 10 reps - 3 hold  Standing Heel Raise with Support - 5 x weekly - 2 sets - 10 reps

## 2022-08-18 ENCOUNTER — OFFICE VISIT (OUTPATIENT)
Dept: ORTHOPEDIC SURGERY | Age: 87
End: 2022-08-18
Payer: COMMERCIAL

## 2022-08-18 DIAGNOSIS — Z74.09 IMPAIRED FUNCTIONAL MOBILITY, BALANCE, GAIT, AND ENDURANCE: ICD-10-CM

## 2022-08-18 DIAGNOSIS — M25.561 CHRONIC PAIN OF BOTH KNEES: ICD-10-CM

## 2022-08-18 DIAGNOSIS — G89.29 CHRONIC PAIN OF BOTH KNEES: ICD-10-CM

## 2022-08-18 DIAGNOSIS — M25.551 BILATERAL HIP PAIN: Primary | ICD-10-CM

## 2022-08-18 DIAGNOSIS — M25.552 BILATERAL HIP PAIN: Primary | ICD-10-CM

## 2022-08-18 DIAGNOSIS — M25.562 CHRONIC PAIN OF BOTH KNEES: ICD-10-CM

## 2022-08-18 DIAGNOSIS — G89.29 CHRONIC BILATERAL LOW BACK PAIN, UNSPECIFIED WHETHER SCIATICA PRESENT: ICD-10-CM

## 2022-08-18 DIAGNOSIS — M54.50 CHRONIC BILATERAL LOW BACK PAIN, UNSPECIFIED WHETHER SCIATICA PRESENT: ICD-10-CM

## 2022-08-18 DIAGNOSIS — M62.81 MUSCLE WEAKNESS: ICD-10-CM

## 2022-08-18 PROCEDURE — 97110 THERAPEUTIC EXERCISES: CPT | Performed by: PHYSICAL THERAPIST

## 2022-08-18 PROCEDURE — 97140 MANUAL THERAPY 1/> REGIONS: CPT | Performed by: PHYSICAL THERAPIST

## 2022-08-29 ENCOUNTER — CARE COORDINATION (OUTPATIENT)
Dept: CARE COORDINATION | Facility: CLINIC | Age: 87
End: 2022-08-29

## 2022-08-31 ENCOUNTER — TELEPHONE (OUTPATIENT)
Dept: INTERNAL MEDICINE CLINIC | Facility: CLINIC | Age: 87
End: 2022-08-31

## 2022-08-31 NOTE — TELEPHONE ENCOUNTER
Pt called stating that she saw her Ortho provider and he suggested steroid injections for her hip. She was advised to ask her PCP if it was okay with her current medications. Please advise.

## 2022-08-31 NOTE — PROGRESS NOTES
[facility] [address] [address]  Dept: [phone]      Physical Therapy Daily Note     Referring MD: Sukumar Carney MD  Diagnosis:     ICD-10-CM    1. Bilateral hip pain  M25.551     M25.552       2. Chronic pain of both knees  M25.561     M25.562     G89.29       3. Muscle weakness  M62.81       4. Impaired functional mobility, balance, gait, and endurance  Z74.09       5. Chronic bilateral low back pain, unspecified whether sciatica present  M54.50     G89.29       6. 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 Pt had some L hip pain at that time that improved after R TOYA. Returned to Dr. Shi French 8/10/22 with report of L>R leg \"giving out\" and pain lateral aspects of both hips. Knees swell and hurt as well. Pt also complains of R toes drawing in, burning pain L>R lower leg. X-rays demonstrated stable bone ingrown R TOYA, marked DJD L hip, and marked DJD B knees. MD notes that biggest issue is likely degenerative disc disease and spondylosis lumbar spine with elements of spinal stenosis as she complains of weakness in the legs as well as numbness/burning sensation calves and feet. MD states pt is a fall risk and referred to PT for gait training and discussed use of a walker.  Pt referred to back specialist and will consider viscosupplementation. Pt prefers to use rubs. Describe current symptoms: poor balance, legs give way, B hip and knee pain, R ankle pain, L lower leg pain  Patient Stated Goals: walk better and use my limbs    Total Timed Procedure Codes: 43 min, Total Time: 43 min    SUBJECTIVE     Pt reports that she is having trouble completing her home program due to time constraints and medication makes her feel like resting more. She has not been walking as much as the exercises take too long. OBJECTIVE     Findings: Gait- entered and exited clinic with cane in R hand (lowered one notch) demonstrating good stability with symmetrical step length and adequate foot clearance    Treatment provided today:  Therapeutic exercise (27168) x 35 min to develop ROM, strength, endurance and flexibility of the trunk and LEs. Standing heel/toe raises 2x10  Bridge w/ ball squeeze 2x10  Hooklying abdominal brace 2x10  Sidelying clamshell 2x10 B  Supine SLR + abdominal brace 2x10 B  Sit-stand from standard chair with hands on knees 2x10, SBA  Updated HEP- exercise number reduced and most to be performed 3 days/week with walking on 2-3 different days/week    Manual therapy (82994) x 8 min utilizing techniques to improve joint and/or soft tissue mobility, ROM, and function as well as helping to decrease pain/spasms and swelling. Palpation and assessment of soft tissue, muscles, and landmarks   STM B gluteals using tennis ball in sidelying    ASSESSMENT     Pt very stable with walking in and out of the clinic, holding cane but not actually using it. Pt reports difficulty completing Hep due to time constraint as well as limited energy stores to complete exercise and daily chores. Decreased number of exericses in HEP and recommend complete program 3 days/week with exception of seated abdominal brace to be completed several times/day in sitting. T will walk on alternate days.     PLAN     Follow up in 2 weeks  Effective Dates: 8/12/2022 TO 10/11/2022 (60 days). GOALS     Short term goals to be met by 9/9/2022  (4 weeks):  Patient will demonstrate good recall of HEP requiring no more than minimal verbal cuing for proper form and technique. Pt will increase L hip abduction strength to at least 3+/5 in order to increase stability with gait. Pt will transfer sit- 2 attempts without loss of balance on initial standing using BUE support. Pt will walk safely with walker for all community mobility and household mobility as able (space permitting). Pt will improve score on the Tinetti to at least 16/28 demonstrating improved safety with gait. Long term goals to be met by 10/11/2022  (8 weeks):  Patient will be compliant and independent with a comprehensive HEP and activity progression. Pt will increase strength in B hips with MMT to at least 4/5 (hip extension to 3/5) for improved stability with gait. Pt will transfer sit- 1 attempt without loss of balance on initial standing using BUE support. Patient will demonstrate independent supine to sit transfers using proper form and technique. Pt will improve score on the Tinetti to at least 20/28 demonstrating decreased risk of falling. [employer]  Access Code: [email or web address]  URL: [email or web address]  Date: 09/01/2022  Prepared by: Pretty Worley    Exercises  Seated Transversus Abdominis Bracing - 3 x daily - 1 sets - 5 reps - 5 hold  Supine Bridge with Mini Swiss Ball Between Knees - 3 x weekly - 2 sets - 10 reps - 5 hold  Clamshell - 3 x weekly - 2 sets - 10 reps - 3 hold  Supine Pelvic Tilt with Straight Leg Raise - 3 x weekly - 2 sets - 10 reps - 5 hold  Standing Heel Raise with Support - 3 x weekly - 3 sets - 10 reps

## 2022-08-31 NOTE — TELEPHONE ENCOUNTER
----- Message from Kevin Martines sent at 8/31/2022 11:14 AM EDT -----  Subject: Message to Provider    QUESTIONS  Information for Provider? Pt need's a call back regarding went to her hip   doctor wanted to get steroid shots if can get permission with her   medications to take regarding shots ?   ---------------------------------------------------------------------------  --------------  [id number]  [phone]; OK to leave message on voicemail  ---------------------------------------------------------------------------  --------------  SCRIPT ANSWERS  Relationship to Patient?  Self

## 2022-09-01 ENCOUNTER — OFFICE VISIT (OUTPATIENT)
Dept: ORTHOPEDIC SURGERY | Age: 87
End: 2022-09-01
Payer: COMMERCIAL

## 2022-09-01 DIAGNOSIS — G89.29 CHRONIC PAIN OF BOTH KNEES: ICD-10-CM

## 2022-09-01 DIAGNOSIS — M54.50 CHRONIC BILATERAL LOW BACK PAIN, UNSPECIFIED WHETHER SCIATICA PRESENT: ICD-10-CM

## 2022-09-01 DIAGNOSIS — M25.561 ACUTE PAIN OF RIGHT KNEE: ICD-10-CM

## 2022-09-01 DIAGNOSIS — Z74.09 IMPAIRED FUNCTIONAL MOBILITY, BALANCE, GAIT, AND ENDURANCE: ICD-10-CM

## 2022-09-01 DIAGNOSIS — M62.81 MUSCLE WEAKNESS: ICD-10-CM

## 2022-09-01 DIAGNOSIS — M25.552 BILATERAL HIP PAIN: Primary | ICD-10-CM

## 2022-09-01 DIAGNOSIS — G89.29 CHRONIC BILATERAL LOW BACK PAIN, UNSPECIFIED WHETHER SCIATICA PRESENT: ICD-10-CM

## 2022-09-01 DIAGNOSIS — M25.562 CHRONIC PAIN OF BOTH KNEES: ICD-10-CM

## 2022-09-01 DIAGNOSIS — M25.551 BILATERAL HIP PAIN: Primary | ICD-10-CM

## 2022-09-01 DIAGNOSIS — M25.561 CHRONIC PAIN OF BOTH KNEES: ICD-10-CM

## 2022-09-01 PROCEDURE — 97110 THERAPEUTIC EXERCISES: CPT | Performed by: PHYSICAL THERAPIST

## 2022-09-01 PROCEDURE — 97140 MANUAL THERAPY 1/> REGIONS: CPT | Performed by: PHYSICAL THERAPIST

## 2022-09-02 NOTE — TELEPHONE ENCOUNTER
Pt decided not to proceed with injections and she is going to consider taking supplements that Dr. Medhat Marcelino recommended. Pt states that she will bring the bottles by for me to look at today to send to [facility] and see if they would be okay for her to take with her current medications.

## 2022-09-12 ENCOUNTER — CARE COORDINATION (OUTPATIENT)
Dept: CARE COORDINATION | Facility: CLINIC | Age: 87
End: 2022-09-12

## 2022-09-16 ENCOUNTER — TELEPHONE (OUTPATIENT)
Dept: ORTHOPEDIC SURGERY | Age: 87
End: 2022-09-16

## 2022-10-05 ENCOUNTER — CLINICAL DOCUMENTATION (OUTPATIENT)
Dept: ORTHOPEDIC SURGERY | Age: 87
End: 2022-10-05

## 2022-10-05 NOTE — PROGRESS NOTES
[address] [facility]  [address] [place] [address]  Dept: [phone]      Physical Therapy Discharge/Discontinuation Note     Referring MD: Sharmaine Griffiths MD  Date of Initial Evaluation: 8/12/22   Number of visits: 3    Patient has discontinued therapy based on  working independently at home  . Patient was  last seen for PT services on 9/1/2022. At that time, the patient appeared to be gradually progressing with therapy treatment. Therapy goals were partially met and patient demonstrated good recall of HEP. Please see previous notes for objective findings. Pt with limited ability to attend therapy based on finances. Pt cancelled last scheduled appt due to miscommunication regarding time. Pt stopped by clinic 10/4/22 requesting that her cane be adjusted. Diana Beltran was unable to be adjusted any lower and notified patient that a shorter cane (~1-inch lower) would be beneficial.  Pt states that she is doing well with her home program and would like to be discharged.

## 2022-10-12 ENCOUNTER — CARE COORDINATION (OUTPATIENT)
Dept: CARE COORDINATION | Facility: CLINIC | Age: 87
End: 2022-10-12

## 2022-10-12 NOTE — CARE COORDINATION
VM left with pt to follow up about MOW. Pt has asked to hold off on setting it up until she finished therapy. Looks like she was DCed-SW CM can refer to MOW if pt indicates. Awaiting response.

## 2022-10-25 ENCOUNTER — CARE COORDINATION (OUTPATIENT)
Dept: CARE COORDINATION | Facility: CLINIC | Age: 87
End: 2022-10-25

## 2022-10-25 NOTE — CARE COORDINATION
No returned calls regarding MOW or any further needs. DEVAUGHN CM removed from care team at this time. Can rejoin in the future if indicated.

## 2022-12-07 DIAGNOSIS — I10 ESSENTIAL HYPERTENSION: ICD-10-CM

## 2022-12-07 DIAGNOSIS — I25.119 ATHEROSCLEROSIS OF NATIVE CORONARY ARTERY OF NATIVE HEART WITH ANGINA PECTORIS (HCC): ICD-10-CM

## 2022-12-07 DIAGNOSIS — R71.8 ELEVATED MCV: ICD-10-CM

## 2022-12-07 DIAGNOSIS — E78.5 HYPERLIPIDEMIA LDL GOAL <100: ICD-10-CM

## 2022-12-07 DIAGNOSIS — E55.9 VITAMIN D DEFICIENCY: ICD-10-CM

## 2022-12-07 LAB
APPEARANCE UR: CLEAR
BACTERIA URNS QL MICRO: NEGATIVE /HPF
BASOPHILS # BLD: 0 K/UL (ref 0–0.2)
BASOPHILS NFR BLD: 1 % (ref 0–2)
BILIRUB UR QL: NEGATIVE
CASTS URNS QL MICRO: NORMAL /LPF (ref 0–2)
COLOR UR: NORMAL
DIFFERENTIAL METHOD BLD: ABNORMAL
EOSINOPHIL # BLD: 0 K/UL (ref 0–0.8)
EOSINOPHIL NFR BLD: 1 % (ref 0.5–7.8)
EPI CELLS #/AREA URNS HPF: NORMAL /HPF (ref 0–5)
ERYTHROCYTE [DISTWIDTH] IN BLOOD BY AUTOMATED COUNT: 13.5 % (ref 11.9–14.6)
GLUCOSE UR STRIP.AUTO-MCNC: NEGATIVE MG/DL
HCT VFR BLD AUTO: 39.2 % (ref 35.8–46.3)
HGB BLD-MCNC: 12.7 G/DL (ref 11.7–15.4)
HGB UR QL STRIP: NEGATIVE
IMM GRANULOCYTES # BLD AUTO: 0 K/UL (ref 0–0.5)
IMM GRANULOCYTES NFR BLD AUTO: 0 % (ref 0–5)
KETONES UR QL STRIP.AUTO: NEGATIVE MG/DL
LEUKOCYTE ESTERASE UR QL STRIP.AUTO: NEGATIVE
LYMPHOCYTES # BLD: 1.3 K/UL (ref 0.5–4.6)
LYMPHOCYTES NFR BLD: 45 % (ref 13–44)
MCH RBC QN AUTO: 32.8 PG (ref 26.1–32.9)
MCHC RBC AUTO-ENTMCNC: 32.4 G/DL (ref 31.4–35)
MCV RBC AUTO: 101.3 FL (ref 82–102)
MONOCYTES # BLD: 0.2 K/UL (ref 0.1–1.3)
MONOCYTES NFR BLD: 7 % (ref 4–12)
MUCOUS THREADS URNS QL MICRO: 0 /LPF
NEUTS SEG # BLD: 1.4 K/UL (ref 1.7–8.2)
NEUTS SEG NFR BLD: 46 % (ref 43–78)
NITRITE UR QL STRIP.AUTO: NEGATIVE
NRBC # BLD: 0 K/UL (ref 0–0.2)
PH UR STRIP: 7 (ref 5–9)
PLATELET # BLD AUTO: 201 K/UL (ref 150–450)
PMV BLD AUTO: 11.9 FL (ref 9.4–12.3)
PROT UR STRIP-MCNC: NEGATIVE MG/DL
RBC # BLD AUTO: 3.87 M/UL (ref 4.05–5.2)
RBC #/AREA URNS HPF: NORMAL /HPF (ref 0–5)
SP GR UR REFRACTOMETRY: <1.005 (ref 1–1.02)
URINE CULTURE IF INDICATED: NORMAL
UROBILINOGEN UR QL STRIP.AUTO: 0.2 EU/DL (ref 0.2–1)
WBC # BLD AUTO: 2.9 K/UL (ref 4.3–11.1)
WBC URNS QL MICRO: NORMAL /HPF (ref 0–4)

## 2022-12-08 LAB
ALBUMIN SERPL-MCNC: 3.8 G/DL (ref 3.2–4.6)
ALBUMIN/GLOB SERPL: 1.3 (ref 0.4–1.6)
ALP SERPL-CCNC: 142 U/L (ref 50–136)
ALT SERPL-CCNC: 99 U/L (ref 12–65)
ANION GAP SERPL CALC-SCNC: 4 MMOL/L (ref 2–11)
AST SERPL-CCNC: 79 U/L (ref 15–37)
BILIRUB SERPL-MCNC: 0.5 MG/DL (ref 0.2–1.1)
BUN SERPL-MCNC: 12 MG/DL (ref 8–23)
CALCIUM SERPL-MCNC: 9.5 MG/DL (ref 8.3–10.4)
CHLORIDE SERPL-SCNC: 109 MMOL/L (ref 101–110)
CHOLEST SERPL-MCNC: 199 MG/DL
CO2 SERPL-SCNC: 30 MMOL/L (ref 21–32)
CREAT SERPL-MCNC: 0.8 MG/DL (ref 0.6–1)
GLOBULIN SER CALC-MCNC: 3 G/DL (ref 2.8–4.5)
GLUCOSE SERPL-MCNC: 90 MG/DL (ref 65–100)
HDLC SERPL-MCNC: 82 MG/DL (ref 40–60)
HDLC SERPL: 2.4
LDLC SERPL CALC-MCNC: 105.8 MG/DL
POTASSIUM SERPL-SCNC: 4.2 MMOL/L (ref 3.5–5.1)
PROT SERPL-MCNC: 6.8 G/DL (ref 6.3–8.2)
SODIUM SERPL-SCNC: 143 MMOL/L (ref 133–143)
TRIGL SERPL-MCNC: 56 MG/DL (ref 35–150)
VLDLC SERPL CALC-MCNC: 11.2 MG/DL (ref 6–23)

## 2022-12-11 LAB — 25(OH)D3 SERPL-MCNC: 62.8 NG/ML (ref 30–100)

## 2022-12-14 RX ORDER — EZETIMIBE 10 MG/1
10 TABLET ORAL DAILY
Qty: 90 TABLET | Refills: 3 | Status: CANCELLED | OUTPATIENT
Start: 2022-12-14

## 2022-12-15 ENCOUNTER — OFFICE VISIT (OUTPATIENT)
Dept: INTERNAL MEDICINE CLINIC | Facility: CLINIC | Age: 87
End: 2022-12-15
Payer: COMMERCIAL

## 2022-12-15 VITALS
TEMPERATURE: 98.1 F | HEART RATE: 66 BPM | BODY MASS INDEX: 22.19 KG/M2 | OXYGEN SATURATION: 97 % | SYSTOLIC BLOOD PRESSURE: 130 MMHG | HEIGHT: 60 IN | DIASTOLIC BLOOD PRESSURE: 80 MMHG | WEIGHT: 113 LBS

## 2022-12-15 DIAGNOSIS — Z28.21 PNEUMOCOCCAL VACCINATION DECLINED BY PATIENT: ICD-10-CM

## 2022-12-15 DIAGNOSIS — E78.5 HYPERLIPIDEMIA LDL GOAL <100: ICD-10-CM

## 2022-12-15 DIAGNOSIS — N39.41 URGE INCONTINENCE: ICD-10-CM

## 2022-12-15 DIAGNOSIS — I10 ESSENTIAL HYPERTENSION: Primary | ICD-10-CM

## 2022-12-15 DIAGNOSIS — Z28.21 INFLUENZA VACCINATION DECLINED BY PATIENT: ICD-10-CM

## 2022-12-15 DIAGNOSIS — Z28.21 COVID-19 VACCINE SERIES DECLINED: ICD-10-CM

## 2022-12-15 DIAGNOSIS — E55.9 VITAMIN D DEFICIENCY: ICD-10-CM

## 2022-12-15 DIAGNOSIS — Z28.310 COVID-19 VACCINE SERIES DECLINED: ICD-10-CM

## 2022-12-15 DIAGNOSIS — R74.8 ELEVATED LIVER ENZYMES: ICD-10-CM

## 2022-12-15 DIAGNOSIS — I25.119 ATHEROSCLEROSIS OF NATIVE CORONARY ARTERY OF NATIVE HEART WITH ANGINA PECTORIS (HCC): ICD-10-CM

## 2022-12-15 PROCEDURE — 99215 OFFICE O/P EST HI 40 MIN: CPT | Performed by: PHYSICIAN ASSISTANT

## 2022-12-15 PROCEDURE — 1123F ACP DISCUSS/DSCN MKR DOCD: CPT | Performed by: PHYSICIAN ASSISTANT

## 2022-12-15 RX ORDER — PSEUDOEPHEDRINE HCL 30 MG
TABLET ORAL
COMMUNITY

## 2022-12-15 ASSESSMENT — PATIENT HEALTH QUESTIONNAIRE - PHQ9
SUM OF ALL RESPONSES TO PHQ9 QUESTIONS 1 & 2: 3
SUM OF ALL RESPONSES TO PHQ QUESTIONS 1-9: 4
10. IF YOU CHECKED OFF ANY PROBLEMS, HOW DIFFICULT HAVE THESE PROBLEMS MADE IT FOR YOU TO DO YOUR WORK, TAKE CARE OF THINGS AT HOME, OR GET ALONG WITH OTHER PEOPLE: 1
9. THOUGHTS THAT YOU WOULD BE BETTER OFF DEAD, OR OF HURTING YOURSELF: 0
SUM OF ALL RESPONSES TO PHQ QUESTIONS 1-9: 4
3. TROUBLE FALLING OR STAYING ASLEEP: 0
6. FEELING BAD ABOUT YOURSELF - OR THAT YOU ARE A FAILURE OR HAVE LET YOURSELF OR YOUR FAMILY DOWN: 0
4. FEELING TIRED OR HAVING LITTLE ENERGY: 0
2. FEELING DOWN, DEPRESSED OR HOPELESS: 1
7. TROUBLE CONCENTRATING ON THINGS, SUCH AS READING THE NEWSPAPER OR WATCHING TELEVISION: 0
SUM OF ALL RESPONSES TO PHQ QUESTIONS 1-9: 4
8. MOVING OR SPEAKING SO SLOWLY THAT OTHER PEOPLE COULD HAVE NOTICED. OR THE OPPOSITE, BEING SO FIGETY OR RESTLESS THAT YOU HAVE BEEN MOVING AROUND A LOT MORE THAN USUAL: 1
SUM OF ALL RESPONSES TO PHQ QUESTIONS 1-9: 4
1. LITTLE INTEREST OR PLEASURE IN DOING THINGS: 2
5. POOR APPETITE OR OVEREATING: 0

## 2022-12-15 ASSESSMENT — ENCOUNTER SYMPTOMS: SHORTNESS OF BREATH: 0

## 2022-12-15 NOTE — Clinical Note
Please call and add a nonfasting lab on Jan 6 if possible to recheck liver enzymes before she sees cardiologist on 1/10/23. Remind her that we are doing a trial off Isosorbide but she should be back on Norvasc daily. Also can we go ahead and start working on getting branded Norvasc approved again for 2023? Thanks!

## 2022-12-15 NOTE — PROGRESS NOTES
Betsy Ortiz (:  1935) is a 80 y.o. female,Established patient, here for evaluation of the following chief complaint(s):  Follow-up (Hypertension, Hyperlipidemia, Vit D Def, Urge Incontinence)         ASSESSMENT/PLAN:  1. Essential hypertension  -     NORVASC 10 MG tablet; Take 1 tablet by mouth every morning Brand medically neccessary!, Disp-90 tablet, R-3, DAWNormal  -     CBC with Auto Differential; Future  -     Comprehensive Metabolic Panel; Future  2. Hyperlipidemia LDL goal <100  -     Comprehensive Metabolic Panel; Future  -     Lipid Panel; Future  3. Atherosclerosis of native coronary artery of native heart with angina pectoris ([facility] [address]  -     Lipid Panel; Future  4. Influenza vaccination declined by patient  5. COVID-19 vaccine series declined  6. Pneumococcal vaccination declined by patient  7. Vitamin D deficiency  8. Elevated liver enzymes  -     Hepatic Function Panel; Future  -     Comprehensive Metabolic Panel; Future  9. Urge incontinence      Patient Instructions   Recommend trial off Isosorbide x 3 weeks - will plan to recheck before follow-up with cardiology so we have confirmation or denial of that as the source of liver enzyme elevation  Encouraged to resume her routine supplements like vitamin d  Reassured that she could take Gentle Move supplement daily for long term bowel improvements  Restart Myrbetriq but will try at a lower dose (25 mg) to see if she can tolerate without aggravating constipation  Continue chronic medications as prescribed - ensure daily consistency with Norvasc  Monitor blood pressure 2-3 times/month and keep record to bring to next appointment  Reviewed options for injectable cholesterol medication but patient declines      Return in about 2 months (around 2/15/2023) for MWE + recheck. Subjective   SUBJECTIVE/OBJECTIVE:  The patient is a 80 y.o. female who is seen for follow-up of hypertension. She is not exercising and is adherent to low salt diet. Daily caffiene intake: a known amount (none). Current medication regimen consists of: Norvasc 10 mg daily - taking every other day for the past 2 weeks due to symptoms she is experiencing and trying to figure out what is causing them. Blood pressure is not measured at home. BP Readings from Last 3 Encounters:   12/15/22 130/80   07/12/22 120/66   07/08/22 130/62       Patient is here for follow-up of vitamin d deficiency. The current state of this condition is control uncertain and no significant medication side effects noted on OTC vitamin d3 1000 iu daily - not taking for the past month as prescribed, medication compliance problems: taking a break off her supplements. Vit D, 25-Hydroxy   Date Value Ref Range Status   12/07/2022 62.8 30.0 - 100.0 ng/mL Final   06/02/2022 44.9 30.0 - 100.0 ng/mL Final   11/09/2021 43.5 30.0 - 100.0 ng/mL Final     Comment:     Vitamin D deficiency has been defined by the [address] practice guideline as a  level of serum 25-OH vitamin D less than 20 ng/mL (1,2). The Endocrine Society went on to further define vitamin D  insufficiency as a level between 21 and 29 ng/mL (2). 1. IOM ([place] of Medicine). 2010. Dietary reference     intakes for calcium and D. [facility]: The     [employer]. 2. Mark VANCE, Kelly HUERTAS, Nettie ROMAN, et al.     Evaluation, treatment, and prevention of vitamin D     deficiency: an Endocrine Society clinical practice     guideline. JCEM. 2011 Jul; 96(7):1911-30. The patient is a 80 y.o. female who is seen for evaluation of hyperlipidemia. She was tested because of hyperlipidemia w/ LDL goal < 100 + CAD. The current state of this condition is poorly controlled - not taking Red Yeast Rice supplements as prescribed, adverse effects: sweating, weakness, and fatigue.   Previous treatments tried include Zetia which caused muscle spasms, Nexletol which caused leg cramps & swelling, and multiple statins which caused severe muscle pains. She is not interested in PCSK9 inhibitors due to cost.        Last Lipid Panel:   Lab Results   Component Value Date    CHOL 199 12/07/2022    CHOL 165 06/02/2022    CHOL 178 11/09/2021     Lab Results   Component Value Date    TRIG 56 12/07/2022    TRIG 48 06/02/2022    TRIG 48 11/09/2021     Lab Results   Component Value Date    HDL 82 (H) 12/07/2022    HDL 83 (H) 06/02/2022    HDL 81 11/09/2021     Lab Results   Component Value Date    LDLCALC 105.8 (H) 12/07/2022    LDLCALC 72.4 06/02/2022    LDLCALC 87 11/09/2021     Lab Results   Component Value Date    LABVLDL 11.2 12/07/2022    LABVLDL 9.6 06/02/2022    LABVLDL 13 06/25/2020    VLDL 10 11/09/2021    VLDL 7 06/22/2021    VLDL 10 01/13/2021     Lab Results   Component Value Date    CHOLHDLRATIO 2.4 12/07/2022    CHOLHDLRATIO 2.0 06/02/2022       Patient is here for follow-up of urge incontinence. The current state of this condition is poorly controlled - not taking Myrbetriq as prescribed, medication compliance problems: constipation. Patient is here for follow-up of elevated liver enzymes since Nov 2021. The current state of this condition is asymptomatic and poorly controlled - not currently on medications for this problem. Review of her medications started prior to ALT increasing included Gentle Move supplement (reviewed each ingredient for potential liver toxicity but none found) & Isosorbide Mononitrate which was started in Sept 2021. She says she's been off it for ~ 1 month now. Lab Results   Component Value Date    ALT 99 (H) 12/07/2022    AST 79 (H) 12/07/2022    ALKPHOS 142 (H) 12/07/2022    BILITOT 0.5 12/07/2022       Review of Systems   Constitutional:  Positive for fatigue. Negative for unexpected weight change. Respiratory:  Negative for shortness of breath. Cardiovascular:  Negative for chest pain, palpitations and leg swelling.    Gastrointestinal:  Positive for constipation (pellet like - requires Gentle Move supplement + laxatives to keep her going every 1-2 days). Genitourinary:  Positive for enuresis, frequency and urgency. Negative for dysuria and hematuria. Musculoskeletal:  Positive for arthralgias (L hip & knee) and myalgias (intermittent bilateral leg cramps). Neurological:  Positive for headaches (associated with stress). Negative for dizziness. /80 (Site: Left Upper Arm, Position: Sitting, Cuff Size: Small Adult)   Pulse 66   Temp 98.1 °F (36.7 °C) (Temporal)   Ht 5' (1.524 m)   Wt 113 lb (51.3 kg)   SpO2 97%   BMI 22.07 kg/m²       Objective   Physical Exam  Constitutional:       Appearance: Normal appearance. HENT:      Head: Normocephalic and atraumatic. Eyes:      Conjunctiva/sclera: Conjunctivae normal.      Pupils: Pupils are equal, round, and reactive to light. Neck:      Vascular: No carotid bruit. Cardiovascular:      Rate and Rhythm: Normal rate and regular rhythm. Heart sounds: Normal heart sounds. Pulmonary:      Effort: Pulmonary effort is normal.      Breath sounds: Normal breath sounds. Musculoskeletal:         General: Normal range of motion. Cervical back: Normal range of motion. Right lower leg: No edema. Left lower leg: No edema. Skin:     General: Skin is warm and dry. Neurological:      Mental Status: She is alert and oriented to person, place, and time. Psychiatric:         Mood and Affect: Mood normal.         Behavior: Behavior normal.         Thought Content: Thought content normal.         Judgment: Judgment normal.          On this date 12/15/2022 I have spent 40 minutes reviewing previous notes, test results and face to face with the patient discussing the diagnosis and importance of compliance with the treatment plan as well as documenting on the day of the visit. An electronic signature was used to authenticate this note.     --Mayte Villa PA-C

## 2022-12-19 RX ORDER — AMLODIPINE BESYLATE 10 MG
10 TABLET ORAL EVERY MORNING
Qty: 90 TABLET | Refills: 3 | Status: SHIPPED | OUTPATIENT
Start: 2022-12-19

## 2022-12-19 ASSESSMENT — ENCOUNTER SYMPTOMS: CONSTIPATION: 1

## 2022-12-20 ENCOUNTER — TELEPHONE (OUTPATIENT)
Dept: INTERNAL MEDICINE CLINIC | Facility: CLINIC | Age: 87
End: 2022-12-20

## 2022-12-20 DIAGNOSIS — R74.8 ELEVATED LIVER ENZYMES: Primary | ICD-10-CM

## 2022-12-20 NOTE — TELEPHONE ENCOUNTER
PA Case: [id number], Status: Approved, Coverage Starts on: 1/1/2022 12:00:00 AM, Coverage Ends on: 12/31/2023 12:00:00 AM. Questions? Contact [phone].

## 2022-12-20 NOTE — TELEPHONE ENCOUNTER
Called pt back who states that her Isosorbide was from a different  this last time she filled it and is wondering if that was what altered her lab results. She states that she did not feel comfortable stopping the Isosorbide like you had requested because she lives alone and has tachycardia sometimes.

## 2022-12-20 NOTE — TELEPHONE ENCOUNTER
----- Message from Calos Pete PA-C sent at 12/19/2022  7:23 PM EST -----  Please call and add a nonfasting lab on Jan 6 if possible to recheck liver enzymes before she sees cardiologist on 1/10/23. Remind her that we are doing a trial off Isosorbide but she should be back on Norvasc daily. Also can we go ahead and start working on getting branded Norvasc approved again for 2023? Thanks!

## 2022-12-20 NOTE — TELEPHONE ENCOUNTER
Please contact pt to schedule a non-fasting lab before she sees her Cardiologist on 1/10/23. Thank you.

## 2022-12-20 NOTE — TELEPHONE ENCOUNTER
----- Message from Umberto Mercy sent at 12/20/2022  1:26 PM EST -----  Subject: Medication Problem    Medication: isosorbide mononitrate (IMDUR) 30 MG extended release tablet  Dosage: once a day   Ordering Provider: Lucia BREAUX    Question/Problem: Pt would like to talk to someone about the medication   affecting her lab work. She was told to stop taking med but she is still   on it because she believes it messed with her lab results being from a   [facility].        Pharmacy: [address] [place] [address], [address] [place] [address] Drive   [phone] Osiel Gilford [phone]    ---------------------------------------------------------------------------  --------------  Romelia Marcelino INFO  [phone]; OK to leave message on voicemail  ---------------------------------------------------------------------------  --------------    SCRIPT ANSWERS  Relationship to Patient: Self

## 2022-12-20 NOTE — PATIENT INSTRUCTIONS
Recommend trial off Isosorbide x 3 weeks - will plan to recheck before follow-up with cardiology so we have confirmation or denial of that as the source of liver enzyme elevation  Encouraged to resume her routine supplements like vitamin d  Reassured that she could take Gentle Move supplement daily for long term bowel improvements  Restart Myrbetriq but will try at a lower dose (25 mg) to see if she can tolerate without aggravating constipation  Continue chronic medications as prescribed - ensure daily consistency with Norvasc  Monitor blood pressure 2-3 times/month and keep record to bring to next appointment  Reviewed options for injectable cholesterol medication but patient declines

## 2022-12-20 NOTE — TELEPHONE ENCOUNTER
PA initiated on CMM through [id number] for branded Norvasc 10 mg tablets. [facility]: [id number]. Waiting on determination.

## 2022-12-21 ENCOUNTER — TELEPHONE (OUTPATIENT)
Dept: CARDIOLOGY CLINIC | Age: 87
End: 2022-12-21

## 2022-12-21 RX ORDER — ISOSORBIDE MONONITRATE 30 MG/1
30 TABLET, EXTENDED RELEASE ORAL DAILY
Qty: 90 TABLET | Refills: 3 | Status: SHIPPED | OUTPATIENT
Start: 2022-12-21

## 2022-12-21 NOTE — TELEPHONE ENCOUNTER
Pt's daughter called back stating that pt will proceed with the US and lab work and she has contacted her Cardiologist to enquire about the Isosorbide.

## 2022-12-21 NOTE — TELEPHONE ENCOUNTER
Brand name only ordered. Requested Prescriptions     Signed Prescriptions Disp Refills    isosorbide mononitrate (IMDUR) 30 MG extended release tablet 90 tablet 3     Sig: Take 1 tablet by mouth daily Brand name only.      Authorizing Provider: Jada Santillan     Ordering User: Marilu Hassan

## 2022-12-21 NOTE — TELEPHONE ENCOUNTER
Spoke with pt's daughter who states that she will speak with her mother about whether or not they would like to proceed with the liver US and additional lab work. She also states that her mother would like to reach out to her Cardiologist herself to ask about the Isosorbide.

## 2022-12-21 NOTE — TELEPHONE ENCOUNTER
Pt daughter Kiel Silva states that pt can not take generic brand of RX isosorbide mononitrate 30 mg. Kiel Silva states she needs the name brand of RX. Kiel Silva states that the generic brand is effecting the enzymes in her liver. Kiel Silva would appreciate a call back.

## 2022-12-21 NOTE — TELEPHONE ENCOUNTER
Caroline states they do not have brand only Imdur. Will notify patient to call pharmacies to find brand name Imdur. LVM for patient's daughter, Milad Gillis. She needs to call around to a pharmacy who carries Imdur. Requested she call me back with pharmacy name so RX can be sent in to that pharmacy.

## 2022-12-22 NOTE — TELEPHONE ENCOUNTER
Radiology tech states that we need to order a US abd complete and specify in the comment section that you want to look at the liver. I have pended the order they recommended.

## 2022-12-22 NOTE — TELEPHONE ENCOUNTER
Labs and imaging are already ordered. Also her cardiologist wrote back and does not think that the Imdur is causing her liver enzymes to be high. He does also recommend an ultrasound to evaluate further. Did we get an answer about Tylenol use?

## 2022-12-22 NOTE — TELEPHONE ENCOUNTER
Can you call radiology and see if I ordered the right type of ultrasound to look at her liver? There are multiple options in the system so unsure if I chose the right one.

## 2022-12-23 ENCOUNTER — TELEPHONE (OUTPATIENT)
Dept: CARDIOLOGY CLINIC | Age: 87
End: 2022-12-23

## 2022-12-23 NOTE — TELEPHONE ENCOUNTER
Patient's daughter has called today returning [address] call from 12/21/2022. Please call patient Judge Arnold back at [phone]. This conversation is a continuing telephone call from yesterday. See all notes from 12/21/22 & 12/22/2022 before calling patient back.

## 2022-12-23 NOTE — TELEPHONE ENCOUNTER
Pts daughter called and stated her mother can only take the brand name for imdur. Due to previous issues from the generic. She stated all the local pharmacy's are out of the brand name and said it isn't in the [address] anywhere. Please advise.

## 2022-12-27 ENCOUNTER — TELEPHONE (OUTPATIENT)
Dept: INTERNAL MEDICINE CLINIC | Facility: CLINIC | Age: 87
End: 2022-12-27

## 2022-12-27 NOTE — TELEPHONE ENCOUNTER
I spoke with the pts daughter. She informed me it was our job to call the other doc office to see why they took her mom off the medication. I asked would they like a sooner apt to speak with the doctor she explained that its my job to speak with the doctor and then speak with her. I explained it would be much easier for her to speak with the doctor in person since she is the pt and she could ask questions, and explain concerns. Pt and daughter did not agree and will speak with the doctor on 1-10-22 when she comes for her apt.

## 2023-01-10 ENCOUNTER — OFFICE VISIT (OUTPATIENT)
Dept: CARDIOLOGY CLINIC | Age: 88
End: 2023-01-10
Payer: COMMERCIAL

## 2023-01-10 VITALS
HEIGHT: 60 IN | SYSTOLIC BLOOD PRESSURE: 144 MMHG | BODY MASS INDEX: 22.16 KG/M2 | DIASTOLIC BLOOD PRESSURE: 80 MMHG | WEIGHT: 112.9 LBS | HEART RATE: 66 BPM

## 2023-01-10 DIAGNOSIS — Z95.5 S/P CORONARY ARTERY STENT PLACEMENT: ICD-10-CM

## 2023-01-10 DIAGNOSIS — I10 PRIMARY HYPERTENSION: ICD-10-CM

## 2023-01-10 DIAGNOSIS — I34.0 NONRHEUMATIC MITRAL VALVE REGURGITATION: ICD-10-CM

## 2023-01-10 DIAGNOSIS — I35.1 NONRHEUMATIC AORTIC VALVE INSUFFICIENCY: ICD-10-CM

## 2023-01-10 DIAGNOSIS — I25.118 ATHEROSCLEROSIS OF NATIVE CORONARY ARTERY OF NATIVE HEART WITH STABLE ANGINA PECTORIS (HCC): Primary | ICD-10-CM

## 2023-01-10 DIAGNOSIS — E78.2 MIXED HYPERLIPIDEMIA: ICD-10-CM

## 2023-01-10 PROCEDURE — 99214 OFFICE O/P EST MOD 30 MIN: CPT | Performed by: INTERNAL MEDICINE

## 2023-01-10 PROCEDURE — 1123F ACP DISCUSS/DSCN MKR DOCD: CPT | Performed by: INTERNAL MEDICINE

## 2023-01-10 PROCEDURE — 93000 ELECTROCARDIOGRAM COMPLETE: CPT | Performed by: INTERNAL MEDICINE

## 2023-01-10 ASSESSMENT — ENCOUNTER SYMPTOMS
HEMATOCHEZIA: 0
EYE REDNESS: 0
HEMATEMESIS: 0
DOUBLE VISION: 0
WHEEZING: 0
STRIDOR: 0
ABDOMINAL PAIN: 0
HOARSE VOICE: 0
HEMOPTYSIS: 0

## 2023-01-10 NOTE — PROGRESS NOTES
[facility] CARDIOLOGY  [address] [facility], [address] [facility], [address] [place] [address]  PHONE: [phone]          01/10/23    NAME:  Tate Tracey  : 1935  MRN: [id number]         SUBJECTIVE:   Tate Tracey is a 80 y.o. female seen for a visit regarding the following:     Chief Complaint   Patient presents with    6 Month Follow-Up    Coronary Artery Disease    Hypertension    Other     Mitral Valve regurgitation / Aortic valve insufficiency           HPI:    Cardiology problem list:   1. Coronary artery disease   - Diagonal stent in    -Coronary angiography on 2019 showed 80 to 90% distal RCA disease. Stented with a 3.5 x 18 mm Xience Janae drug-eluting stent   Left main was normal, proximal LAD had diffuse moderate atherosclerosis. Diagonal branch had previously a stent that was widely patent. -Mild irregularities noted in the proximal circumflex. Moderate disease was noted in the proximal first obtuse marginal.   EF was 65%. -Nuclear Stress test from 10/5/2021-EF 73%, normal perfusion.     -Echo from 10/14/2021 shows an EF of 60 to 65% mild aortic regurgitation, mild to moderate mitral regurgitation   2. Hypertension   3. Hyperlipidemia-statin intolerance( lipitor and crestor)   4. PACs-atrial bigeminy noted on EKG from 2021   5. Aortic regurgitation-mild from echo 2021   6. Mitral regurgitation-mild to moderate from echo 2021         I saw Ms. Alcantar who is a pleasant 68-year-old -American woman in cardiovascular follow-up on for coronary artery disease with previous PCI, stable angina, , hypertension, hyperlipidemia, aortic and mitral regurgitation.     -Nuclear stress test from 2021 showed no evidence of significant inducible ischemia with preserved LV function. The echo showed normal LV systolic function with an EF of 60 to 65%, no significant regional wall motion abnormalities, mild aortic and mild to moderate  mitral regurgitation.       Chest pain: Says her chest pain is much better. When she gets emotionally upset at this point, she does not have the chest tightness that she used to have routinely in the past.  Only takes amlodipine on an as-needed basis and I told her that she would at least benefit from taking 5 mg once daily. Hypertension: Elevated pressures-has not been taking her amlodipine. Quit taking isosorbide also. Hyperlipidemia: Statin intolerance. Was recently started on Zetia and appears that she tolerated well but she decided to take her self off of it. Not on any lipid therapy at this point. Not interested in anything      -Palpitations: Used to be a problem in the past but no complaints at this point related to her significant palpitations. Past Medical History, Past Surgical History, Family history, Social History, and Medications were all reviewed with the patient today and updated as necessary.      Allergies   Allergen Reactions    Latex     Clopidogrel Other (See Comments)     Joints ache    Penicillins Anaphylaxis    Aspirin Other (See Comments)     Stomach burn with uncoated aspirin; pt takes coated 81 mg aspirin daily    Diclofenac Sodium Other (See Comments)    Peg Ointment Base [Polyethylene Glycol] Other (See Comments)     Stomach cramps/cp    Pregabalin Other (See Comments)     inflammation    Soy Itching    Ticagrelor Other (See Comments)     Pt get bruising, dry mouth, cant sleep, constipation, itching    Tramadol Nausea Only    Atorvastatin Other (See Comments), Rash and Swelling     Joint swelling     Patient Active Problem List   Diagnosis    Arthritis, degenerative    Mixed hyperlipidemia    Chronic anxiety    IBS (irritable bowel syndrome)    HTN (hypertension)    Vitamin D deficiency    Nonrheumatic aortic valve insufficiency    Nonrheumatic mitral valve regurgitation    Chest pain    Diverticulosis of sigmoid colon    S/P coronary artery stent placement    Depression    GERD (gastroesophageal reflux disease)    Coronary atherosclerosis of native coronary artery     Past Medical History:   Diagnosis Date    Anal stenosis     Per GI    Aortic valve insufficiency     Chronic constipation     Colon cancer ([facility] [address] 1992    In Situ    Coronary atherosclerosis of native coronary artery 6/11/2013    stent placement    DDD (degenerative disc disease), cervical     Hyperlipidemia LDL goal <70     Hypertension     controlled with med    Menopause     Mild diastolic dysfunction 42/30/2866    Per ECHO    Mitral regurgitation 02/18/2009    Mild to Moderate Per ECHO    OA (osteoarthritis) 6/11/2013    PUD (peptic ulcer disease) 1980's    Urge incontinence     Vitamin D deficiency      Past Surgical History:   Procedure Laterality Date    COLONOSCOPY N/A 3/10/2017    COLONOSCOPY  BMI 28 performed by Leatha [facility] MD at [facility]  4/2012 & 6/2019    HEMORRHOID SURGERY  1970    ORTHOPEDIC SURGERY Left     Hammer Toe Repair/Reconstruction    TOTAL HIP ARTHROPLASTY Right 11/13/2014    TUBAL LIGATION       Family History   Problem Relation Age of Onset    Cancer Sister     Cancer Mother         Colon    Breast Cancer Sister     Alzheimer's Disease Brother     Cancer Brother     Diabetes Sister     Osteoarthritis Sister     Dementia Brother     Seizures Brother     Alcohol Abuse Brother     Cancer Sister         Bone    Hypertension Mother     Alcohol Abuse Brother     Colon Cancer Brother     Coronary Art Dis Brother     Osteoarthritis Brother     Other Brother         PAD    Seizures Brother     No Known Problems Maternal Grandmother     No Known Problems Maternal Grandfather     Breast Cancer Daughter     Stroke Father 80    Breast Cancer Sister     Alcohol Abuse Sister      Social History     Tobacco Use    Smoking status: Never    Smokeless tobacco: Never   Substance Use Topics    Alcohol use: No     Current Outpatient Medications   Medication Sig Dispense Refill    mirabegron (MYRBETRIQ) 25 MG TB24 Take 25 mg by mouth daily      UNABLE TO FIND 2 times daily Gentle Move stool softener OTC      NORVASC 10 MG tablet Take 1 tablet by mouth every morning Brand medically neccessary! 90 tablet 3    Lidocaine 5 % CREA Topical 5% Lidocaine and 5% Neurontin to apply to affected area up to 4 times/day as needed for pain 45 g 2    Nutritional Supplements (NUTRITIONAL SUPPLEMENT PO) Take by mouth Indications: GENTLE MOVE stool softener blend      UNABLE TO FIND Take 1 Units by mouth as needed Bio-Active Silver Hydrosol   1 teaspoon prn  Silver= 50 mcg      ascorbic acid (VITAMIN C) 1000 MG tablet Take 100 mg by mouth daily      aspirin 81 MG EC tablet Take 81 mg by mouth daily      vitamin D 25 MCG (1000 UT) CAPS Take by mouth daily      isosorbide mononitrate (IMDUR) 30 MG extended release tablet Take 1 tablet by mouth daily Brand name only. (Patient not taking: Reported on 1/10/2023) 90 tablet 3     No current facility-administered medications for this visit. Review of Systems   Constitutional: Negative for chills and fever. HENT:  Negative for ear discharge, hoarse voice and stridor. Eyes:  Negative for double vision and redness. Cardiovascular:  Negative for cyanosis and syncope. Respiratory:  Negative for hemoptysis and wheezing. Endocrine: Negative for polydipsia and polyphagia. Hematologic/Lymphatic: Negative for adenopathy. Skin:  Negative for itching and rash. Musculoskeletal:  Positive for arthritis. Negative for joint swelling and muscle weakness. Gastrointestinal:  Negative for abdominal pain, hematemesis and hematochezia. Genitourinary:  Negative for flank pain and nocturia. Neurological:  Negative for focal weakness and seizures. Psychiatric/Behavioral:  Negative for altered mental status and suicidal ideas. Allergic/Immunologic: Negative for hives.      PHYSICAL EXAM:    BP (!) 144/80   Pulse 66   Ht 5' (1.524 m)   Wt 112 lb 14.4 oz (51.2 kg)   BMI 22.05 kg/m²      Physical Exam    General: Alert and oriented in no acute distress  HEENT: Head is normocephalic, atraumatic, pupils are equal bilaterally, throat appears to be clear  Neck: No significant jugular venous distention no cervical bruits  Cardiovascular: S1 and S2 heard, regular rate and rhythm, no significant murmurs rubs or gallops. Respiratory: Clear to auscultation bilaterally with no adventitious sounds, respirations are normal  Abdomen: Soft, nontender, nondistended, bowel sounds present. Extremities: No cyanosis clubbing or edema  Peripheral pulses: Bilateral radial artery pulses are palpated. Bilateral pedal pulses are well felt. Neuro: No facial droop and no gross focal motor deficits  Lymphatic: No significant cervical lymphadenopathy noted. Musculoskeletal: No significant redness or swelling noted in all exposed joints. Skin: No significant rashes noted the of the exposed regions. Medical problems and test results were reviewed with the patient today. No results found for this or any previous visit (from the past 672 hour(s)). Lab Results   Component Value Date/Time    CHOL 199 12/07/2022 09:59 AM    HDL 82 12/07/2022 09:59 AM    VLDL 10 11/09/2021 10:46 AM   ,hemoglobin, basic metabolic panel, No results found for: TSH, TSH2, TSH3 ,  Lab Results   Component Value Date/Time     12/07/2022 09:59 AM    K 4.2 12/07/2022 09:59 AM     12/07/2022 09:59 AM    CO2 30 12/07/2022 09:59 AM    BUN 12 12/07/2022 09:59 AM    GFRAA >60 06/02/2022 11:10 AM    GLOB 3.0 12/07/2022 09:59 AM    ALT 99 12/07/2022 09:59 AM    AST 79 12/07/2022 09:59 AM      Lab Results   Component Value Date    LDLCALC 105.8 (H) 12/07/2022      Lab Results   Component Value Date    CREATININE 0.80 12/07/2022      10/14/21    TRANSTHORACIC ECHOCARDIOGRAM (TTE) COMPLETE (CONTRAST/BUBBLE/3D PRN) 10/14/2021  2:19 PM (Final)    Narrative  This is a summary report.  The complete report is available in the patient's medical record. If you cannot access the medical record, please contact the sending organization for a detailed fax or copy. · LV: Estimated LVEF is 60 - 65%. Normal cavity size, wall thickness, systolic function (ejection fraction normal) and diastolic function. · AV: Mild aortic valve regurgitation is present. · TV: Mild tricuspid valve regurgitation is present. Right Ventricular Arterial Pressure (RVSP) is 21 mmHg. Pulmonary hypertension not suggested by Doppler findings. · MV: Mild to moderate mitral valve regurgitation is present. Signed by: Flora Pelaez MD on 10/14/2021  2:19 PM       ASSESSMENT and PLAN    Atherosclerosis of native coronary artery of native heart with stable angina pectoris [facility])  -S/p previous PCI in 2012. Doing well with no complaints of any significant angina at this point unless she is emotionally upset-continue  antianginal therapy with amlodipine at current dosing. Continue aspirin 81 mg daily. Nonrheumatic mitral valve regurgitation  -Mild mitral regurgitation noted on last echo    Nonrheumatic aortic valve insufficiency  -Mild aortic regurgitation on last echo-euvolemic otherwise on exam    Primary hypertension  -Elevated pressures-has not been taking amlodipine or isosorbide-told her to get back onto amlodipine at least 5 mg once daily. Okay to hold off on isosorbide in the absence of angina. Mixed hyperlipidemia  -Only taking Zetia. We told her to get back on it but she is not interested. It appears that she did not tolerate statin therapy well in the past with myalgias. S/P coronary artery stent placement  -Previous PCI in 2012-doing well at this point-on aspirin 81 mg daily. Nuclear stress test from 2011 with normal perfusion. Overall Impression  -No complaints of any angina at this point. She has come off with Zetia 10 mg once daily which had helped her lipids and isosorbide-she is very reluctant to take any medical therapy. She has not been taking her amlodipine either and I told her to at least take half a pill once daily since her blood pressures are elevated. -I will see her in follow-up in 6 months time. Counseled on importance of remaining active. Return in about 6 months (around 7/10/2023) for cad, htn, hld. Thank you Darylene Beach for allowing us to participate in the care of your patient. If you have any further questions, please do not hesitate to contact us.   Sincerely,        Neno Wood MD   1/10/2023

## 2023-01-10 NOTE — PATIENT INSTRUCTIONS
-Take half a pill of your amlodipine once daily and stay on this as this will really make a difference to keep your blood pressures well controlled-prevent heart attacks and strokes.

## 2023-01-23 ENCOUNTER — TELEPHONE (OUTPATIENT)
Dept: INTERNAL MEDICINE CLINIC | Facility: CLINIC | Age: 88
End: 2023-01-23

## 2023-01-23 NOTE — TELEPHONE ENCOUNTER
Pt notified that she has not had a [address] in our office in over a year, so she can keep her scheduled appt. Pt verbalized understanding.

## 2023-01-23 NOTE — TELEPHONE ENCOUNTER
----- Message from [employer] sent at 1/19/2023  4:37 PM EST -----  Subject: Message to Provider    QUESTIONS  Information for Provider? Patient says that the insurance says she is   covered for awv and labs but not until they have faraz a year apart. She   would like to speak with Lakisha Rae nurse. Needs to know if she   needs to cancel this appointment. Please call patient to advise.   ---------------------------------------------------------------------------  --------------  Cecy CHE  [phone]; OK to leave message on voicemail  ---------------------------------------------------------------------------  --------------  SCRIPT ANSWERS  Relationship to Patient?  Self

## 2023-02-01 ENCOUNTER — TELEPHONE (OUTPATIENT)
Dept: INTERNAL MEDICINE CLINIC | Facility: CLINIC | Age: 88
End: 2023-02-01

## 2023-02-01 NOTE — TELEPHONE ENCOUNTER
Pt's daughter called requesting a same day sick appt. Informed the daughter that we do not have any availability until 2/3/23. Pt's daughter stated she needed one today and did not want to wait until Friday to have her mother seen. Informed the pt that she could take her mother to an [facility] urgent care because they are affiliated with [address] [place] [address]. Pt's daughter voiced understanding.

## 2023-02-16 ENCOUNTER — OFFICE VISIT (OUTPATIENT)
Dept: INTERNAL MEDICINE CLINIC | Facility: CLINIC | Age: 88
End: 2023-02-16
Payer: COMMERCIAL

## 2023-02-16 VITALS
DIASTOLIC BLOOD PRESSURE: 80 MMHG | WEIGHT: 112 LBS | HEART RATE: 62 BPM | HEIGHT: 60 IN | OXYGEN SATURATION: 99 % | SYSTOLIC BLOOD PRESSURE: 120 MMHG | BODY MASS INDEX: 21.99 KG/M2 | TEMPERATURE: 98.1 F | RESPIRATION RATE: 16 BRPM

## 2023-02-16 DIAGNOSIS — E78.5 HYPERLIPIDEMIA LDL GOAL <100: Primary | ICD-10-CM

## 2023-02-16 DIAGNOSIS — I10 ESSENTIAL HYPERTENSION: ICD-10-CM

## 2023-02-16 DIAGNOSIS — R74.8 ELEVATED SERUM GGT LEVEL: ICD-10-CM

## 2023-02-16 DIAGNOSIS — E55.9 VITAMIN D DEFICIENCY: Primary | ICD-10-CM

## 2023-02-16 DIAGNOSIS — I25.119 ATHEROSCLEROSIS OF NATIVE CORONARY ARTERY OF NATIVE HEART WITH ANGINA PECTORIS (HCC): ICD-10-CM

## 2023-02-16 DIAGNOSIS — N39.41 URGE INCONTINENCE: ICD-10-CM

## 2023-02-16 DIAGNOSIS — J30.2 SEASONAL ALLERGIC RHINITIS, UNSPECIFIED TRIGGER: ICD-10-CM

## 2023-02-16 PROCEDURE — 99215 OFFICE O/P EST HI 40 MIN: CPT | Performed by: PHYSICIAN ASSISTANT

## 2023-02-16 PROCEDURE — 1123F ACP DISCUSS/DSCN MKR DOCD: CPT | Performed by: PHYSICIAN ASSISTANT

## 2023-02-16 RX ORDER — LORATADINE 10 MG/1
10 TABLET ORAL DAILY PRN
Qty: 30 TABLET | Refills: 5 | Status: SHIPPED | OUTPATIENT
Start: 2023-02-16

## 2023-02-16 SDOH — ECONOMIC STABILITY: FOOD INSECURITY: WITHIN THE PAST 12 MONTHS, THE FOOD YOU BOUGHT JUST DIDN'T LAST AND YOU DIDN'T HAVE MONEY TO GET MORE.: OFTEN TRUE

## 2023-02-16 SDOH — ECONOMIC STABILITY: FOOD INSECURITY: WITHIN THE PAST 12 MONTHS, YOU WORRIED THAT YOUR FOOD WOULD RUN OUT BEFORE YOU GOT MONEY TO BUY MORE.: OFTEN TRUE

## 2023-02-16 SDOH — ECONOMIC STABILITY: HOUSING INSECURITY
IN THE LAST 12 MONTHS, WAS THERE A TIME WHEN YOU DID NOT HAVE A STEADY PLACE TO SLEEP OR SLEPT IN A SHELTER (INCLUDING NOW)?: NO

## 2023-02-16 SDOH — ECONOMIC STABILITY: INCOME INSECURITY: HOW HARD IS IT FOR YOU TO PAY FOR THE VERY BASICS LIKE FOOD, HOUSING, MEDICAL CARE, AND HEATING?: HARD

## 2023-02-16 ASSESSMENT — LIFESTYLE VARIABLES
HOW OFTEN DO YOU HAVE A DRINK CONTAINING ALCOHOL: NEVER
HOW MANY STANDARD DRINKS CONTAINING ALCOHOL DO YOU HAVE ON A TYPICAL DAY: PATIENT DOES NOT DRINK

## 2023-02-16 ASSESSMENT — ENCOUNTER SYMPTOMS
RHINORRHEA: 1
SORE THROAT: 0
SHORTNESS OF BREATH: 0
SINUS PRESSURE: 0
EYE DISCHARGE: 1
CONSTIPATION: 1

## 2023-02-16 ASSESSMENT — PATIENT HEALTH QUESTIONNAIRE - PHQ9
SUM OF ALL RESPONSES TO PHQ QUESTIONS 1-9: 1
2. FEELING DOWN, DEPRESSED OR HOPELESS: 1
1. LITTLE INTEREST OR PLEASURE IN DOING THINGS: 0
SUM OF ALL RESPONSES TO PHQ QUESTIONS 1-9: 1
SUM OF ALL RESPONSES TO PHQ QUESTIONS 1-9: 1
SUM OF ALL RESPONSES TO PHQ9 QUESTIONS 1 & 2: 1
SUM OF ALL RESPONSES TO PHQ QUESTIONS 1-9: 1

## 2023-02-16 NOTE — PROGRESS NOTES
Shailesh King (:  1935) is a 80 y.o. female,Established patient, here for evaluation of the following chief complaint(s):  Follow-up (Hypertension, Hyperlipidemia, Urge Incontinence) and Medicare AWV (Was due to have but apparently was performed by [facility] on 8/3/22)         ASSESSMENT/PLAN:  1. Hyperlipidemia LDL goal <100  -     Comprehensive Metabolic Panel; Future  -     Lipid Panel; Future  2. Elevated serum GGT level  3. Atherosclerosis of native coronary artery of native heart with angina pectoris ([address]  -     Lipid Panel; Future  4. Essential hypertension  -     CBC with Auto Differential; Future  -     Comprehensive Metabolic Panel; Future  5. Urge incontinence  6. Seasonal allergic rhinitis, unspecified trigger  -     loratadine (CLARITIN) 10 MG tablet; Take 1 tablet by mouth daily as needed (allergy symptoms), Disp-30 tablet, R-5Normal      Patient Instructions   Start Claritin 10 mg daily x 1-2 months until spring allergy season has subsided  Reminded to stay well hydrated with plenty of water  Proceed with abdominal ultrasound as ordered given elevated GGT despite normalization of liver enzymes  Provided number for scheduling so patient can get ultrasound completed  Continue chronic medications as prescribed - remain on Amlodipine 5 mg daily since it appears to be sufficient for now  Monitor blood pressure 1-2 times/week and keep record to bring to next appointment  Agreed that she should remain on Myrbetriq 50 mg daily but if constipation worsens she will need to find other ways of dealing with it rather than stopping the Myrbetriq since med takes 6 weeks to take effect each time it is stopped/started      Return in about 6 months (around 2023) for MWE + routine f/u. Subjective   SUBJECTIVE/OBJECTIVE:  HPI  The patient is a 80 y.o. female who is seen for evaluation of hyperlipidemia. She was tested because of hyperlipidemia w/ LDL goal < 100 + CAD. The current state of this condition is asymptomatic and stable - not currently on medications for this problem. Last Lipid Panel:   Lab Results   Component Value Date    CHOL 198 02/09/2023    CHOL 199 12/07/2022    CHOL 165 06/02/2022     Lab Results   Component Value Date    TRIG 67 02/09/2023    TRIG 56 12/07/2022    TRIG 48 06/02/2022     Lab Results   Component Value Date    HDL 81 (H) 02/09/2023    HDL 82 (H) 12/07/2022    HDL 83 (H) 06/02/2022     Lab Results   Component Value Date    LDLCALC 103.6 (H) 02/09/2023    LDLCALC 105.8 (H) 12/07/2022    LDLCALC 72.4 06/02/2022     Lab Results   Component Value Date    LABVLDL 13.4 02/09/2023    LABVLDL 11.2 12/07/2022    LABVLDL 9.6 06/02/2022    VLDL 10 11/09/2021    VLDL 7 06/22/2021    VLDL 10 01/13/2021     Lab Results   Component Value Date    CHOLHDLRATIO 2.4 02/09/2023    CHOLHDLRATIO 2.4 12/07/2022    CHOLHDLRATIO 2.0 06/02/2022       The patient is a 80 y.o. female who is seen for follow-up of hypertension. She is not exercising and is adherent to low salt diet. Daily caffiene intake: a known amount (none). Current medication regimen consists of: Norvasc 5 mg (1/2 of 10 mg tablet) daily. Blood pressure is not measured at home. BP Readings from Last 3 Encounters:   02/16/23 120/80   01/10/23 (!) 144/80   12/15/22 130/80       Patient is here for follow-up of urge incontinence. The current state of this condition is improved and no significant medication side effects noted on Myrbetriq 50 mg daily - taking as prescribed, felt like 25 mg stopped working as well as it did when she first started taking it. Patient had previously been reasonably controlled on 50 mg daily but blamed it for causing constipation which she already has chronically anyway. Patient is here for follow-up of elevated liver enzymes. The current state of this condition is asymptomatic and improved - not currently on medications for this problem.   She is currently off Isosorbide and only taking 1/2 dose of Norvasc. Lab Results   Component Value Date    ALT 42 02/09/2023    AST 21 02/09/2023    GGT 67 (H) 02/09/2023    ALKPHOS 96 02/09/2023    ALKPHOS 97 02/09/2023    BILITOT 0.5 02/09/2023         Additional concerns addressed today include recurrent watery eyes & runny nose that seems worse when she goes outside. She was recently treated for a sinus infection at urgent care with Meek Tam which she says upset her stomach greatly. She notes a history of seasonal allergic rhinitis.       Past Medical History:   Diagnosis Date    Anal stenosis     Per GI    Aortic valve insufficiency     Chronic constipation     Colon cancer ([address] 1992    In Situ    Coronary atherosclerosis of native coronary artery 6/11/2013    stent placement    DDD (degenerative disc disease), cervical     Hyperlipidemia LDL goal <70     Hypertension     controlled with med    Menopause     Mild diastolic dysfunction 76/81/5930    Per ECHO    Mitral regurgitation 02/18/2009    Mild to Moderate Per ECHO    OA (osteoarthritis) 6/11/2013    PUD (peptic ulcer disease) 1980's    Urge incontinence     Vitamin D deficiency        Past Surgical History:   Procedure Laterality Date    COLONOSCOPY N/A 3/10/2017    COLONOSCOPY  BMI 28 performed by Tamia Can MD at [address]  4/2012 & 6/2019    HEMORRHOID SURGERY  1970    ORTHOPEDIC SURGERY Left     Hammer Toe Repair/Reconstruction    TOTAL HIP ARTHROPLASTY Right 11/13/2014    TUBAL LIGATION         Family History   Problem Relation Age of Onset    Cancer Sister     Cancer Mother         Colon    Breast Cancer Sister     Alzheimer's Disease Brother     Cancer Brother     Diabetes Sister     Osteoarthritis Sister     Dementia Brother     Seizures Brother     Alcohol Abuse Brother     Cancer Sister         Bone    Hypertension Mother     Alcohol Abuse Brother     Colon Cancer Brother     Coronary Art Dis Brother Osteoarthritis Brother     Other Brother         PAD    Seizures Brother     No Known Problems Maternal Grandmother     No Known Problems Maternal Grandfather     Breast Cancer Daughter     Stroke Father 80    Breast Cancer Sister     Alcohol Abuse Sister        Social History     Socioeconomic History    Marital status:       Spouse name: None    Number of children: None    Years of education: None    Highest education level: None   Tobacco Use    Smoking status: Never    Smokeless tobacco: Never   Vaping Use    Vaping Use: Never used   Substance and Sexual Activity    Alcohol use: Never    Drug use: Never     Social Determinants of Health     Financial Resource Strain: High Risk    Difficulty of Paying Living Expenses: Hard   Food Insecurity: Food Insecurity Present    Worried About Running Out of Food in the Last Year: Often true    Ran Out of Food in the Last Year: Often true   Transportation Needs: Unmet Transportation Needs    Lack of Transportation (Non-Medical): Yes   Physical Activity: Insufficiently Active    Days of Exercise per Week: 7 days    Minutes of Exercise per Session: 10 min   Housing Stability: Unknown    Unstable Housing in the Last Year: No       Current Outpatient Medications   Medication Sig Dispense Refill    loratadine (CLARITIN) 10 MG tablet Take 1 tablet by mouth daily as needed (allergy symptoms) 30 tablet 5    mirabegron (MYRBETRIQ) 25 MG TB24 Take 25 mg by mouth daily      UNABLE TO FIND 2 times daily Gentle Move stool softener OTC      NORVASC 10 MG tablet Take 1 tablet by mouth every morning Brand medically neccessary! 90 tablet 3    Lidocaine 5 % CREA Topical 5% Lidocaine and 5% Neurontin to apply to affected area up to 4 times/day as needed for pain 45 g 2    Nutritional Supplements (NUTRITIONAL SUPPLEMENT PO) Take by mouth Indications: GENTLE MOVE stool softener blend      UNABLE TO FIND Take 1 Units by mouth as needed Bio-Active Silver Hydrosol   1 teaspoon prn  Silver= 50 mcg      ascorbic acid (VITAMIN C) 1000 MG tablet Take 100 mg by mouth daily      aspirin 81 MG EC tablet Take 81 mg by mouth daily      vitamin D 25 MCG (1000 UT) CAPS Take by mouth daily       No current facility-administered medications for this visit. Allergies   Allergen Reactions    Latex     Clopidogrel Other (See Comments)     Joints ache    Penicillins Anaphylaxis    Aspirin Other (See Comments)     Stomach burn with uncoated aspirin; pt takes coated 81 mg aspirin daily    Diclofenac Sodium Other (See Comments)    Peg Ointment Base [Polyethylene Glycol] Other (See Comments)     Stomach cramps/cp    Pregabalin Other (See Comments)     inflammation    Soy Itching    Ticagrelor Other (See Comments)     Pt get bruising, dry mouth, cant sleep, constipation, itching    Tramadol Nausea Only    Atorvastatin Other (See Comments), Rash and Swelling     Joint swelling         Review of Systems   Constitutional:  Negative for fatigue and unexpected weight change. HENT:  Positive for rhinorrhea. Negative for congestion, ear pain, postnasal drip, sinus pressure, sneezing and sore throat. Eyes:  Positive for discharge. Respiratory:  Negative for shortness of breath. Cardiovascular:  Positive for palpitations. Negative for chest pain and leg swelling. Gastrointestinal:  Positive for constipation (managed with Miralax + OTC supplement). Genitourinary:  Positive for enuresis, frequency (every 2 hours) and urgency. Negative for dysuria. Allergic/Immunologic: Positive for environmental allergies (seasonal). Neurological:  Positive for dizziness. Negative for headaches. /80   Pulse 62   Temp 98.1 °F (36.7 °C) (Temporal)   Resp 16   Ht 5' (1.524 m)   Wt 112 lb (50.8 kg)   SpO2 99%   BMI 21.87 kg/m²       Objective   Physical Exam  Constitutional:       Appearance: Normal appearance. HENT:      Head: Normocephalic and atraumatic.       Right Ear: Tympanic membrane, ear canal and external ear normal.      Left Ear: Tympanic membrane, ear canal and external ear normal.      Nose:      Right Turbinates: Not swollen. Left Turbinates: Not swollen. Right Sinus: No maxillary sinus tenderness or frontal sinus tenderness. Left Sinus: No maxillary sinus tenderness or frontal sinus tenderness. Comments: Bilateral turbinates erythematous     Mouth/Throat:      Mouth: Mucous membranes are moist.   Eyes:      Extraocular Movements: Extraocular movements intact. Conjunctiva/sclera: Conjunctivae normal.      Pupils: Pupils are equal, round, and reactive to light. Neck:      Thyroid: No thyromegaly. Vascular: No carotid bruit. Cardiovascular:      Rate and Rhythm: Normal rate and regular rhythm. Pulses: Normal pulses. Heart sounds: Normal heart sounds. Pulmonary:      Effort: Pulmonary effort is normal.      Breath sounds: Normal breath sounds. Abdominal:      General: Bowel sounds are normal.      Palpations: Abdomen is soft. Tenderness: There is no abdominal tenderness. Musculoskeletal:         General: Normal range of motion. Cervical back: Normal range of motion. Right lower leg: No edema. Left lower leg: No edema. Lymphadenopathy:      Head:      Right side of head: No submandibular or tonsillar adenopathy. Left side of head: No submandibular or tonsillar adenopathy. Skin:     General: Skin is warm and dry. Neurological:      Mental Status: She is alert and oriented to person, place, and time. Deep Tendon Reflexes:      Reflex Scores:       Bicep reflexes are 2+ on the right side and 2+ on the left side. Patellar reflexes are 2+ on the right side and 2+ on the left side. Psychiatric:         Mood and Affect: Mood normal.         Behavior: Behavior normal.         Thought Content:  Thought content normal.         Judgment: Judgment normal.          On this date 2/16/2023 I have spent 40 minutes reviewing previous notes, test results and face to face with the patient discussing the diagnosis and importance of compliance with the treatment plan as well as documenting on the day of the visit. An electronic signature was used to authenticate this note.     --Angelica Sevilla PA-C

## 2023-02-16 NOTE — Clinical Note
Please update the description of her visit in August to [facility] + routine f/u and let her know that we'll be doing the MWE then since it will have been a year since last done by the nurse that came to her house and insurance will cover it then.

## 2023-02-16 NOTE — PATIENT INSTRUCTIONS
Start Claritin 10 mg daily x 1-2 months until spring allergy season has subsided  Reminded to stay well hydrated with plenty of water  Proceed with abdominal ultrasound as ordered given elevated GGT despite normalization of liver enzymes  Provided number for scheduling so patient can get ultrasound completed  Continue chronic medications as prescribed - remain on Amlodipine 5 mg daily since it appears to be sufficient for now  Monitor blood pressure 1-2 times/week and keep record to bring to next appointment  Agreed that she should remain on Myrbetriq 50 mg daily but if constipation worsens she will need to find other ways of dealing with it rather than stopping the Myrbetriq since med takes 6 weeks to take effect each time it is stopped/started

## 2023-02-21 ENCOUNTER — TELEPHONE (OUTPATIENT)
Dept: INTERNAL MEDICINE CLINIC | Facility: CLINIC | Age: 88
End: 2023-02-21

## 2023-02-21 NOTE — TELEPHONE ENCOUNTER
LVM on 2/21/23 @ 12:50 PM to inform pt that Myrbetriq samples have been placed at check-in for her to pick-up.     Myrbetriq 50 mg tabs- 4 boxes  Lot: [id number]  Exp: 04/2025

## 2023-03-01 ENCOUNTER — HOSPITAL ENCOUNTER (OUTPATIENT)
Dept: ULTRASOUND IMAGING | Age: 88
Discharge: HOME | End: 2023-03-04
Payer: MEDICARE

## 2023-03-01 DIAGNOSIS — R74.8 ELEVATED LIVER ENZYMES: ICD-10-CM

## 2023-03-01 PROCEDURE — 76700 US EXAM ABDOM COMPLETE: CPT

## 2023-07-18 ENCOUNTER — OFFICE VISIT (OUTPATIENT)
Age: 88
End: 2023-07-18
Payer: MEDICARE

## 2023-07-18 VITALS
HEIGHT: 60 IN | DIASTOLIC BLOOD PRESSURE: 80 MMHG | WEIGHT: 113 LBS | BODY MASS INDEX: 22.19 KG/M2 | SYSTOLIC BLOOD PRESSURE: 130 MMHG | HEART RATE: 84 BPM

## 2023-07-18 DIAGNOSIS — I34.0 NONRHEUMATIC MITRAL VALVE REGURGITATION: ICD-10-CM

## 2023-07-18 DIAGNOSIS — I25.118 ATHEROSCLEROSIS OF NATIVE CORONARY ARTERY OF NATIVE HEART WITH STABLE ANGINA PECTORIS (HCC): Primary | ICD-10-CM

## 2023-07-18 DIAGNOSIS — Z95.5 S/P CORONARY ARTERY STENT PLACEMENT: ICD-10-CM

## 2023-07-18 DIAGNOSIS — E78.2 MIXED HYPERLIPIDEMIA: ICD-10-CM

## 2023-07-18 DIAGNOSIS — I10 PRIMARY HYPERTENSION: ICD-10-CM

## 2023-07-18 DIAGNOSIS — I35.1 NONRHEUMATIC AORTIC VALVE INSUFFICIENCY: ICD-10-CM

## 2023-07-18 PROCEDURE — 1123F ACP DISCUSS/DSCN MKR DOCD: CPT | Performed by: INTERNAL MEDICINE

## 2023-07-18 PROCEDURE — G8427 DOCREV CUR MEDS BY ELIG CLIN: HCPCS | Performed by: INTERNAL MEDICINE

## 2023-07-18 PROCEDURE — G8420 CALC BMI NORM PARAMETERS: HCPCS | Performed by: INTERNAL MEDICINE

## 2023-07-18 PROCEDURE — 1090F PRES/ABSN URINE INCON ASSESS: CPT | Performed by: INTERNAL MEDICINE

## 2023-07-18 PROCEDURE — 1036F TOBACCO NON-USER: CPT | Performed by: INTERNAL MEDICINE

## 2023-07-18 PROCEDURE — 99214 OFFICE O/P EST MOD 30 MIN: CPT | Performed by: INTERNAL MEDICINE

## 2023-07-18 ASSESSMENT — ENCOUNTER SYMPTOMS
DOUBLE VISION: 0
EYE REDNESS: 0
WHEEZING: 0
ABDOMINAL PAIN: 0
STRIDOR: 0
HEMATEMESIS: 0
HOARSE VOICE: 0
HEMATOCHEZIA: 0
HEMOPTYSIS: 0

## 2023-08-10 DIAGNOSIS — I10 ESSENTIAL HYPERTENSION: ICD-10-CM

## 2023-08-10 DIAGNOSIS — E78.5 HYPERLIPIDEMIA LDL GOAL <100: ICD-10-CM

## 2023-08-10 DIAGNOSIS — E55.9 VITAMIN D DEFICIENCY: ICD-10-CM

## 2023-08-10 DIAGNOSIS — I25.119 ATHEROSCLEROSIS OF NATIVE CORONARY ARTERY OF NATIVE HEART WITH ANGINA PECTORIS (HCC): ICD-10-CM

## 2023-08-10 LAB
25(OH)D3 SERPL-MCNC: 70.4 NG/ML (ref 30–100)
ALBUMIN SERPL-MCNC: 3.8 G/DL (ref 3.2–4.6)
ALBUMIN/GLOB SERPL: 1.2 (ref 0.4–1.6)
ALP SERPL-CCNC: 124 U/L (ref 50–136)
ALT SERPL-CCNC: 45 U/L (ref 12–65)
ANION GAP SERPL CALC-SCNC: 5 MMOL/L (ref 2–11)
AST SERPL-CCNC: 29 U/L (ref 15–37)
BASOPHILS # BLD: 0 K/UL (ref 0–0.2)
BASOPHILS NFR BLD: 0 % (ref 0–2)
BILIRUB SERPL-MCNC: 0.3 MG/DL (ref 0.2–1.1)
BUN SERPL-MCNC: 16 MG/DL (ref 8–23)
CALCIUM SERPL-MCNC: 9.3 MG/DL (ref 8.3–10.4)
CHLORIDE SERPL-SCNC: 109 MMOL/L (ref 101–110)
CHOLEST SERPL-MCNC: 188 MG/DL
CO2 SERPL-SCNC: 29 MMOL/L (ref 21–32)
CREAT SERPL-MCNC: 0.9 MG/DL (ref 0.6–1)
DIFFERENTIAL METHOD BLD: ABNORMAL
EOSINOPHIL # BLD: 0 K/UL (ref 0–0.8)
EOSINOPHIL NFR BLD: 1 % (ref 0.5–7.8)
ERYTHROCYTE [DISTWIDTH] IN BLOOD BY AUTOMATED COUNT: 13.1 % (ref 11.9–14.6)
GLOBULIN SER CALC-MCNC: 3.1 G/DL (ref 2.8–4.5)
GLUCOSE SERPL-MCNC: 92 MG/DL (ref 65–100)
HCT VFR BLD AUTO: 38.8 % (ref 35.8–46.3)
HDLC SERPL-MCNC: 88 MG/DL (ref 40–60)
HDLC SERPL: 2.1
HGB BLD-MCNC: 12.4 G/DL (ref 11.7–15.4)
IMM GRANULOCYTES # BLD AUTO: 0 K/UL (ref 0–0.5)
IMM GRANULOCYTES NFR BLD AUTO: 0 % (ref 0–5)
LDLC SERPL CALC-MCNC: 90.2 MG/DL
LYMPHOCYTES # BLD: 1.3 K/UL (ref 0.5–4.6)
LYMPHOCYTES NFR BLD: 38 % (ref 13–44)
MCH RBC QN AUTO: 32.3 PG (ref 26.1–32.9)
MCHC RBC AUTO-ENTMCNC: 32 G/DL (ref 31.4–35)
MCV RBC AUTO: 101 FL (ref 82–102)
MONOCYTES # BLD: 0.2 K/UL (ref 0.1–1.3)
MONOCYTES NFR BLD: 6 % (ref 4–12)
NEUTS SEG # BLD: 1.8 K/UL (ref 1.7–8.2)
NEUTS SEG NFR BLD: 55 % (ref 43–78)
NRBC # BLD: 0 K/UL (ref 0–0.2)
PLATELET # BLD AUTO: 204 K/UL (ref 150–450)
PMV BLD AUTO: 11.8 FL (ref 9.4–12.3)
POTASSIUM SERPL-SCNC: 4.1 MMOL/L (ref 3.5–5.1)
PROT SERPL-MCNC: 6.9 G/DL (ref 6.3–8.2)
RBC # BLD AUTO: 3.84 M/UL (ref 4.05–5.2)
SODIUM SERPL-SCNC: 143 MMOL/L (ref 133–143)
TRIGL SERPL-MCNC: 49 MG/DL (ref 35–150)
VLDLC SERPL CALC-MCNC: 9.8 MG/DL (ref 6–23)
WBC # BLD AUTO: 3.3 K/UL (ref 4.3–11.1)

## 2023-08-15 NOTE — PROGRESS NOTES
you if you need to work hard or react quickly. Stress also can last a long time. Long-term stress is caused by stressful situations or events. Examples of long-term stress include long-term health problems, ongoing problems at work, and conflicts in your family. Long-term stress can harm your health. Mindfulness is a focus only on things happening in the present moment. It's a process of purposefully paying attention to and being aware of your surroundings, your emotions, your thoughts, and how your body feels. You are aware of these things, but you aren't judging these experiences as \"good\" or \"bad. \" Mindfulness can help you learn to calm your mind and body to help you cope with illness, pain, and stress. How does mindfulness help to relieve stress? Mindfulness can help quiet your mind and relax your body. Studies show that it can help some people sleep better, feel less anxious, and bring their blood pressure down. And it's been shown to help some people live and cope better with certain health problems like heart disease, depression, chronic pain, and cancer. How do you practice mindfulness? To be mindful is to pay attention, to be present, and to be accepting. Like any new skill or habit, being mindful can take practice. When you're mindful, you do just one thing and you pay close attention to that one thing. For example, you may sit quietly and notice your emotions or how your food tastes and smells. When you're present, you focus on the things that are happening right now. You let go of your thoughts about the past and the future. When you dwell on the past or the future, you miss moments that can heal and strengthen you. You may miss moments like hearing a child laugh or seeing a friendly face when you think you're all alone. When you're accepting, you don't  the present moment. Instead you accept your thoughts and feelings as they come.   You can practice anytime, anywhere, and in any way you

## 2023-08-17 ENCOUNTER — OFFICE VISIT (OUTPATIENT)
Dept: INTERNAL MEDICINE CLINIC | Facility: CLINIC | Age: 88
End: 2023-08-17
Payer: MEDICARE

## 2023-08-17 VITALS
TEMPERATURE: 98.4 F | DIASTOLIC BLOOD PRESSURE: 80 MMHG | HEART RATE: 65 BPM | BODY MASS INDEX: 21.99 KG/M2 | HEIGHT: 60 IN | OXYGEN SATURATION: 99 % | SYSTOLIC BLOOD PRESSURE: 130 MMHG | WEIGHT: 112 LBS

## 2023-08-17 DIAGNOSIS — E55.9 VITAMIN D DEFICIENCY: ICD-10-CM

## 2023-08-17 DIAGNOSIS — Z71.85 VACCINE COUNSELING: ICD-10-CM

## 2023-08-17 DIAGNOSIS — Z00.00 MEDICARE ANNUAL WELLNESS VISIT, SUBSEQUENT: Primary | ICD-10-CM

## 2023-08-17 DIAGNOSIS — I25.119 ATHEROSCLEROSIS OF NATIVE CORONARY ARTERY OF NATIVE HEART WITH ANGINA PECTORIS (HCC): ICD-10-CM

## 2023-08-17 DIAGNOSIS — J30.2 SEASONAL ALLERGIC RHINITIS, UNSPECIFIED TRIGGER: ICD-10-CM

## 2023-08-17 DIAGNOSIS — I10 ESSENTIAL HYPERTENSION: ICD-10-CM

## 2023-08-17 DIAGNOSIS — E78.5 HYPERLIPIDEMIA LDL GOAL <100: ICD-10-CM

## 2023-08-17 DIAGNOSIS — B35.1 ONYCHOMYCOSIS OF TOENAIL: ICD-10-CM

## 2023-08-17 PROCEDURE — G8427 DOCREV CUR MEDS BY ELIG CLIN: HCPCS | Performed by: PHYSICIAN ASSISTANT

## 2023-08-17 PROCEDURE — G0439 PPPS, SUBSEQ VISIT: HCPCS | Performed by: PHYSICIAN ASSISTANT

## 2023-08-17 PROCEDURE — 99214 OFFICE O/P EST MOD 30 MIN: CPT | Performed by: PHYSICIAN ASSISTANT

## 2023-08-17 PROCEDURE — G8420 CALC BMI NORM PARAMETERS: HCPCS | Performed by: PHYSICIAN ASSISTANT

## 2023-08-17 PROCEDURE — 1123F ACP DISCUSS/DSCN MKR DOCD: CPT | Performed by: PHYSICIAN ASSISTANT

## 2023-08-17 PROCEDURE — 1090F PRES/ABSN URINE INCON ASSESS: CPT | Performed by: PHYSICIAN ASSISTANT

## 2023-08-17 PROCEDURE — 1036F TOBACCO NON-USER: CPT | Performed by: PHYSICIAN ASSISTANT

## 2023-08-17 ASSESSMENT — PATIENT HEALTH QUESTIONNAIRE - PHQ9
SUM OF ALL RESPONSES TO PHQ QUESTIONS 1-9: 19
6. FEELING BAD ABOUT YOURSELF - OR THAT YOU ARE A FAILURE OR HAVE LET YOURSELF OR YOUR FAMILY DOWN: 0
2. FEELING DOWN, DEPRESSED OR HOPELESS: 3
SUM OF ALL RESPONSES TO PHQ9 QUESTIONS 1 & 2: 6
4. FEELING TIRED OR HAVING LITTLE ENERGY: 3
3. TROUBLE FALLING OR STAYING ASLEEP: 3
7. TROUBLE CONCENTRATING ON THINGS, SUCH AS READING THE NEWSPAPER OR WATCHING TELEVISION: 2
9. THOUGHTS THAT YOU WOULD BE BETTER OFF DEAD, OR OF HURTING YOURSELF: 0
SUM OF ALL RESPONSES TO PHQ QUESTIONS 1-9: 19
SUM OF ALL RESPONSES TO PHQ QUESTIONS 1-9: 19
1. LITTLE INTEREST OR PLEASURE IN DOING THINGS: 3
10. IF YOU CHECKED OFF ANY PROBLEMS, HOW DIFFICULT HAVE THESE PROBLEMS MADE IT FOR YOU TO DO YOUR WORK, TAKE CARE OF THINGS AT HOME, OR GET ALONG WITH OTHER PEOPLE: 1
SUM OF ALL RESPONSES TO PHQ QUESTIONS 1-9: 19
5. POOR APPETITE OR OVEREATING: 3
8. MOVING OR SPEAKING SO SLOWLY THAT OTHER PEOPLE COULD HAVE NOTICED. OR THE OPPOSITE, BEING SO FIGETY OR RESTLESS THAT YOU HAVE BEEN MOVING AROUND A LOT MORE THAN USUAL: 2

## 2023-08-17 ASSESSMENT — LIFESTYLE VARIABLES
HOW MANY STANDARD DRINKS CONTAINING ALCOHOL DO YOU HAVE ON A TYPICAL DAY: PATIENT DOES NOT DRINK
HOW OFTEN DO YOU HAVE A DRINK CONTAINING ALCOHOL: NEVER

## 2023-08-17 NOTE — PATIENT INSTRUCTIONS
Suggest utilizing OTC Claritin or Allegra x 2 week intervals any time sinuses feel like they're filling up or ears feel congested/full  Monitor blood pressure 1-2 times/week and keep record to bring to next appointment  Continue chronic medications as prescribed  Reviewed the benefits of annual flu vaccination and urged to receive from local pharmacy or us  Counseled that she is eligible and should consider a 2nd pneumonia vaccine - Prevnar 20 for optimal protection/coverage against pneumonia     Preventing Falls: Care Instructions    Talk to your doctor about the medicines you take. Ask if any of them increase the risk of falls and whether they can be changed or stopped. Try to exercise regularly. It can help improve your strength and balance. This can help lower your risk of falling. Practice fall safety and prevention. Wear low-heeled shoes that fit well and give your feet good support. Talk to your doctor if you have foot problems that make this hard. Carry a cellphone or wear a medical alert device that you can use to call for help. Use stepladders instead of chairs to reach high objects. Don't climb if you're at risk for falls. Ask for help, if needed. Wear the correct eyeglasses, if you need them. Make your home safer. Remove rugs, cords, clutter, and furniture from walkways. Keep your house well lit. Use night-lights in hallways and bathrooms. Install and use sturdy handrails on stairways. Wear nonskid footwear, even inside. Don't walk barefoot or in socks without shoes. Be safe outside. Use handrails, curb cuts, and ramps whenever possible. Keep your hands free by using a shoulder bag or backpack. Try to walk in well-lit areas. Watch out for uneven ground, changes in pavement, and debris. Be careful in the winter. Walk on the grass or gravel when sidewalks are slippery. Use de-icer on steps and walkways. Add non-slip devices to shoes.     Put grab bars and nonskid mats in your

## 2023-09-19 ENCOUNTER — TELEPHONE (OUTPATIENT)
Dept: INTERNAL MEDICINE CLINIC | Facility: CLINIC | Age: 88
End: 2023-09-19

## 2023-09-19 NOTE — TELEPHONE ENCOUNTER
Pt notified that her [address] [facility] form for special services has been completed and faxed. Pt verbalized understanding. Form sent to be scanned into pt's chart.

## 2023-10-05 ENCOUNTER — TELEPHONE (OUTPATIENT)
Dept: ORTHOPEDIC SURGERY | Age: 88
End: 2023-10-05

## 2023-10-09 ENCOUNTER — TELEPHONE (OUTPATIENT)
Dept: INTERNAL MEDICINE CLINIC | Facility: CLINIC | Age: 88
End: 2023-10-09

## 2023-10-09 NOTE — TELEPHONE ENCOUNTER
Mickie Tavera from [facility] called to check for MD order for incontinence supplies, to get supply delivery started, sent on 10/4/23. Also following up on physical therapy for left ankle/foot. Asks if Roman Ochoa wants to place order for more physical therapy.

## 2023-10-11 ENCOUNTER — TELEPHONE (OUTPATIENT)
Dept: INTERNAL MEDICINE CLINIC | Facility: CLINIC | Age: 88
End: 2023-10-11

## 2023-10-11 NOTE — TELEPHONE ENCOUNTER
----- Message from Zoraida Rehman sent at 10/5/2023  2:06 PM EDT -----  Subject: Message to Provider    QUESTIONS  Information for Provider? Pt is requesting a knee brace to help with her   knee pain. Please call pt to advise.   ---------------------------------------------------------------------------  --------------  Ben Chase INFO  [phone]; OK to leave message on voicemail  ---------------------------------------------------------------------------  --------------  SCRIPT ANSWERS  Relationship to Patient? Other/Third Party  Representative Name? Daughter   Is the representative on the Communication Release of Information (NOHEMY)   form in Epic?  Yes

## 2023-10-12 ENCOUNTER — TELEPHONE (OUTPATIENT)
Dept: INTERNAL MEDICINE CLINIC | Facility: CLINIC | Age: 88
End: 2023-10-12

## 2023-10-12 NOTE — TELEPHONE ENCOUNTER
Needs to contact ortho about this request since they are the ones who have most recently discussed her knee issues with her and would be better equipped at determining if this would be helpful for her condition.

## 2023-10-12 NOTE — TELEPHONE ENCOUNTER
Incontinence supply order has been completed and signed. Regarding physical therapy - we would need to see her for the ankle/foot issue she is having as I don't see that we have discussed this in any recent office visits to warrant ordering physical therapy.

## 2023-10-13 ENCOUNTER — TELEPHONE (OUTPATIENT)
Dept: INTERNAL MEDICINE CLINIC | Facility: CLINIC | Age: 88
End: 2023-10-13

## 2023-10-13 NOTE — TELEPHONE ENCOUNTER
Patient is scheduled with Dianna on 10/23. Daughter would still like a phone call from Rachelle Ku. States she has some questions. Stated she missed a phone call earlier.

## 2023-10-13 NOTE — TELEPHONE ENCOUNTER
Incontinence supplies form faxed on 10/13/23.  LVM on 10/13/23 @ 2129 to inform pt's daughter and to request that she call back to schedule pt an appt with any available provider in office to request pt a PT referral.

## 2023-10-23 ENCOUNTER — OFFICE VISIT (OUTPATIENT)
Dept: INTERNAL MEDICINE CLINIC | Facility: CLINIC | Age: 88
End: 2023-10-23
Payer: MEDICARE

## 2023-10-23 VITALS
BODY MASS INDEX: 23.56 KG/M2 | WEIGHT: 120 LBS | DIASTOLIC BLOOD PRESSURE: 86 MMHG | HEIGHT: 60 IN | TEMPERATURE: 97.3 F | OXYGEN SATURATION: 99 % | HEART RATE: 67 BPM | SYSTOLIC BLOOD PRESSURE: 130 MMHG

## 2023-10-23 DIAGNOSIS — R26.81 GAIT INSTABILITY: Primary | ICD-10-CM

## 2023-10-23 DIAGNOSIS — M25.551 CHRONIC PAIN OF BOTH HIPS: ICD-10-CM

## 2023-10-23 DIAGNOSIS — G89.29 CHRONIC RIGHT SHOULDER PAIN: ICD-10-CM

## 2023-10-23 DIAGNOSIS — M25.511 CHRONIC RIGHT SHOULDER PAIN: ICD-10-CM

## 2023-10-23 DIAGNOSIS — G89.29 CHRONIC PAIN OF BOTH HIPS: ICD-10-CM

## 2023-10-23 DIAGNOSIS — M17.11 PRIMARY OSTEOARTHRITIS OF RIGHT KNEE: ICD-10-CM

## 2023-10-23 DIAGNOSIS — Z91.81 HISTORY OF FALL: ICD-10-CM

## 2023-10-23 DIAGNOSIS — M25.552 CHRONIC PAIN OF BOTH HIPS: ICD-10-CM

## 2023-10-23 DIAGNOSIS — M17.12 PRIMARY OSTEOARTHRITIS OF LEFT KNEE: ICD-10-CM

## 2023-10-23 PROCEDURE — G8484 FLU IMMUNIZE NO ADMIN: HCPCS | Performed by: NURSE PRACTITIONER

## 2023-10-23 PROCEDURE — G8420 CALC BMI NORM PARAMETERS: HCPCS | Performed by: NURSE PRACTITIONER

## 2023-10-23 PROCEDURE — 99214 OFFICE O/P EST MOD 30 MIN: CPT | Performed by: NURSE PRACTITIONER

## 2023-10-23 PROCEDURE — 1090F PRES/ABSN URINE INCON ASSESS: CPT | Performed by: NURSE PRACTITIONER

## 2023-10-23 PROCEDURE — A9270 NON-COVERED ITEM OR SERVICE: HCPCS | Performed by: NURSE PRACTITIONER

## 2023-10-23 PROCEDURE — 1036F TOBACCO NON-USER: CPT | Performed by: NURSE PRACTITIONER

## 2023-10-23 PROCEDURE — 1123F ACP DISCUSS/DSCN MKR DOCD: CPT | Performed by: NURSE PRACTITIONER

## 2023-10-23 PROCEDURE — G8427 DOCREV CUR MEDS BY ELIG CLIN: HCPCS | Performed by: NURSE PRACTITIONER

## 2023-10-23 ASSESSMENT — ENCOUNTER SYMPTOMS
VOMITING: 0
DIARRHEA: 0
ABDOMINAL PAIN: 0
COUGH: 0
CONSTIPATION: 0
BACK PAIN: 0
SHORTNESS OF BREATH: 0
NAUSEA: 0
SORE THROAT: 0

## 2023-10-23 NOTE — PROGRESS NOTES
[facility]      10/23/2023    Patient Name: Kurt Allison  :  1935      Chief Complaint:  Chief Complaint   Patient presents with    Referral - General     Patient is requesting referral to [facility]. HPI  Patient presents today accompanied by her daughter with request for referral to home health. Patient with history of CAD, aortic valve insufficiency, mitral valve regurg, HTN, OA of bilateral hips and knees, chronic right shoulder pain and falls. No falls recently but complaint of gait instability. Uses a cane. Lives alone. She currently has an aid from [address] coming in for about 3 hours per week. Family is requesting referral to home health to have PT come in and help with balance and gait training.        Past Medical History:   Diagnosis Date    Anal stenosis     Per GI    Aortic valve insufficiency     Chronic constipation     Colon cancer ([address])     In Situ    Coronary atherosclerosis of native coronary artery 2013    stent placement    DDD (degenerative disc disease), cervical     Hyperlipidemia LDL goal <70     Hypertension     controlled with med    Menopause     Mild diastolic dysfunction     Per ECHO    Mitral regurgitation 2009    Mild to Moderate Per ECHO    OA (osteoarthritis) 2013    PUD (peptic ulcer disease) 's    Urge incontinence     Vitamin D deficiency        Past Surgical History:   Procedure Laterality Date    COLONOSCOPY N/A 3/10/2017    COLONOSCOPY  BMI 28 performed by Clare Rivas MD at [address]  2012 & 2019    HEMORRHOID SURGERY  1970    ORTHOPEDIC SURGERY Left     Hammer Toe Repair/Reconstruction    TOTAL HIP ARTHROPLASTY Right 2014    TUBAL LIGATION         Family History   Problem Relation Age of Onset    Colon Cancer Mother     Hypertension Mother     Alzheimer's Disease Mother     Stroke Father 80    Cancer Sister     Breast Cancer

## 2023-11-06 ENCOUNTER — TELEPHONE (OUTPATIENT)
Dept: INTERNAL MEDICINE CLINIC | Facility: CLINIC | Age: 88
End: 2023-11-06

## 2023-11-06 DIAGNOSIS — R26.81 GAIT INSTABILITY: Primary | ICD-10-CM

## 2023-11-06 DIAGNOSIS — M17.0 PRIMARY OSTEOARTHRITIS OF BOTH KNEES: ICD-10-CM

## 2023-11-06 NOTE — TELEPHONE ENCOUNTER
April with [facility] called and requested order for Jeffrey with seat for patient. She states she thinks patient would benefit form the extra support.      [phone]

## 2023-11-07 RX ORDER — CALCIUM CARBONATE 160(400)MG
TABLET,CHEWABLE ORAL
Qty: 1 EACH | Refills: 0 | Status: SHIPPED | OUTPATIENT
Start: 2023-11-07

## 2023-11-08 NOTE — TELEPHONE ENCOUNTER
Prescription printed. If they request an office visit please send last visit that Dianna did. If that doesn't suffice because the need for the rollator wasn't specifically addressed during that visit she may have to come back in or wait until her next scheduled office visit to get this process completed.

## 2023-11-14 ENCOUNTER — TELEPHONE (OUTPATIENT)
Dept: INTERNAL MEDICINE CLINIC | Facility: CLINIC | Age: 88
End: 2023-11-14

## 2023-11-14 NOTE — TELEPHONE ENCOUNTER
Cathy Adjutant from [facility] called, states thinks pt may have pink eye. Sx watery,red, puffy, itching, burning. Having yellowish drainage matting eyes. Allergy drops taken w/ no relief.

## 2023-11-16 NOTE — TELEPHONE ENCOUNTER
Noted.  She could also try adding Zaditor or Pataday eye drops to help more directly with her eye symptoms. These are antihistamine eye drops and if allergy related would likely add some additional relief.

## 2023-11-16 NOTE — TELEPHONE ENCOUNTER
Please find out which allergy drops she has tried. Is this affecting both eyes or one eye? I am willing to treat it but we would need to schedule a virtual visit or telephone call at least in order to prescribe something. An in person visit is ideal so I can take a look at her eyes directly and evaluate the surrounding structures to make sure something bigger isn't to blame.

## 2023-11-16 NOTE — TELEPHONE ENCOUNTER
Pt states that she is currently using Loratadine allergy relief tabs OTC, but no drops. She also states that it is affecting both eyes, but since she started taking loratadine again yesterday, her sx's are improving and she would like to hold off on an appt for a few days to see if it continues to improve.

## 2024-01-18 ENCOUNTER — OFFICE VISIT (OUTPATIENT)
Age: 89
End: 2024-01-18
Payer: MEDICARE

## 2024-01-18 VITALS
HEIGHT: 60 IN | BODY MASS INDEX: 24.54 KG/M2 | HEART RATE: 76 BPM | WEIGHT: 125 LBS | DIASTOLIC BLOOD PRESSURE: 80 MMHG | SYSTOLIC BLOOD PRESSURE: 130 MMHG

## 2024-01-18 DIAGNOSIS — I10 PRIMARY HYPERTENSION: ICD-10-CM

## 2024-01-18 DIAGNOSIS — I25.118 ATHEROSCLEROSIS OF NATIVE CORONARY ARTERY OF NATIVE HEART WITH STABLE ANGINA PECTORIS (HCC): Primary | ICD-10-CM

## 2024-01-18 DIAGNOSIS — I35.1 NONRHEUMATIC AORTIC VALVE INSUFFICIENCY: ICD-10-CM

## 2024-01-18 DIAGNOSIS — Z95.5 S/P CORONARY ARTERY STENT PLACEMENT: ICD-10-CM

## 2024-01-18 DIAGNOSIS — E78.2 MIXED HYPERLIPIDEMIA: ICD-10-CM

## 2024-01-18 DIAGNOSIS — I10 ESSENTIAL HYPERTENSION: ICD-10-CM

## 2024-01-18 DIAGNOSIS — I34.0 NONRHEUMATIC MITRAL VALVE REGURGITATION: ICD-10-CM

## 2024-01-18 PROCEDURE — 99214 OFFICE O/P EST MOD 30 MIN: CPT | Performed by: INTERNAL MEDICINE

## 2024-01-18 PROCEDURE — 1036F TOBACCO NON-USER: CPT | Performed by: INTERNAL MEDICINE

## 2024-01-18 PROCEDURE — G8420 CALC BMI NORM PARAMETERS: HCPCS | Performed by: INTERNAL MEDICINE

## 2024-01-18 PROCEDURE — 1090F PRES/ABSN URINE INCON ASSESS: CPT | Performed by: INTERNAL MEDICINE

## 2024-01-18 PROCEDURE — G8484 FLU IMMUNIZE NO ADMIN: HCPCS | Performed by: INTERNAL MEDICINE

## 2024-01-18 PROCEDURE — G8427 DOCREV CUR MEDS BY ELIG CLIN: HCPCS | Performed by: INTERNAL MEDICINE

## 2024-01-18 PROCEDURE — 1123F ACP DISCUSS/DSCN MKR DOCD: CPT | Performed by: INTERNAL MEDICINE

## 2024-01-18 ASSESSMENT — ENCOUNTER SYMPTOMS
HEMATEMESIS: 0
HEMOPTYSIS: 0
HEMATOCHEZIA: 0
STRIDOR: 0
DOUBLE VISION: 0
WHEEZING: 0
HOARSE VOICE: 0
EYE REDNESS: 0
ABDOMINAL PAIN: 0

## 2024-01-18 NOTE — PROGRESS NOTES
2:19 PM (Final)    Narrative  This is a summary report. The complete report is available in the patient's medical record. If you cannot access the medical record, please contact the sending organization for a detailed fax or copy.    · LV: Estimated LVEF is 60 - 65%. Normal cavity size, wall thickness, systolic function (ejection fraction normal) and diastolic function.  · AV: Mild aortic valve regurgitation is present.  · TV: Mild tricuspid valve regurgitation is present. Right Ventricular Arterial Pressure (RVSP) is 21 mmHg. Pulmonary hypertension not suggested by Doppler findings.  · MV: Mild to moderate mitral valve regurgitation is present.    Signed by: Edil Gruber MD on 10/14/2021  2:19 PM       ASSESSMENT and PLAN    Atherosclerosis of native coronary artery of native heart with stable angina pectoris (HCC)  -S/p previous PCI in 2012.  Doing well with no complaints of any significant angina at this point unless she is emotionally upset-continue  antianginal therapy with amlodipine at current dosing.  Continue aspirin 81 mg daily.    Nonrheumatic mitral valve regurgitation  -Mild mitral regurgitation noted on last echo    Nonrheumatic aortic valve insufficiency  -Mild aortic regurgitation on last echo-euvolemic otherwise on exam    Primary hypertension  -Much better controlled now that she is taking amlodipine 10 mg for the most daily.  On some days she takes 5 mg-half a pill.    Mixed hyperlipidemia  -She was previously Only taking Zetia.  We told her to get back on it but she is not interested.   It appears that she did not tolerate statin therapy well in the past with myalgias /joint swelling and it affected her sleep.  Dietary measures and activity  were encouraged.   -At this stage in her life, Repatha would be too much.    S/P coronary artery stent placement  -Previous PCI in 2012-doing well at this point-on aspirin 81 mg daily.  Nuclear stress test from 2011 with normal perfusion.

## 2024-02-15 ENCOUNTER — HOSPITAL ENCOUNTER (OUTPATIENT)
Dept: LAB | Age: 89
Discharge: HOME | End: 2024-02-18

## 2024-02-15 DIAGNOSIS — I25.119 ATHEROSCLEROSIS OF NATIVE CORONARY ARTERY OF NATIVE HEART WITH ANGINA PECTORIS (HCC): ICD-10-CM

## 2024-02-15 DIAGNOSIS — I10 ESSENTIAL HYPERTENSION: ICD-10-CM

## 2024-02-15 DIAGNOSIS — E78.5 HYPERLIPIDEMIA LDL GOAL <100: ICD-10-CM

## 2024-02-15 DIAGNOSIS — E55.9 VITAMIN D DEFICIENCY: ICD-10-CM

## 2024-02-15 LAB
25(OH)D3 SERPL-MCNC: 77.5 NG/ML (ref 30–100)
ALBUMIN SERPL-MCNC: 4.4 G/DL (ref 3.2–4.6)
ALBUMIN/GLOB SERPL: 1.3 (ref 0.4–1.6)
ALP SERPL-CCNC: 131 U/L (ref 50–136)
ALT SERPL-CCNC: 35 U/L (ref 12–65)
ANION GAP SERPL CALC-SCNC: 8 MMOL/L (ref 2–11)
AST SERPL-CCNC: 26 U/L (ref 15–37)
BASOPHILS # BLD: 0 K/UL (ref 0–0.2)
BASOPHILS NFR BLD: 0 % (ref 0–2)
BILIRUB SERPL-MCNC: 0.4 MG/DL (ref 0.2–1.1)
BUN SERPL-MCNC: 15 MG/DL (ref 8–23)
CALCIUM SERPL-MCNC: 9.5 MG/DL (ref 8.3–10.4)
CHLORIDE SERPL-SCNC: 106 MMOL/L (ref 103–113)
CHOLEST SERPL-MCNC: 234 MG/DL
CO2 SERPL-SCNC: 30 MMOL/L (ref 21–32)
CREAT SERPL-MCNC: 0.8 MG/DL (ref 0.6–1)
DIFFERENTIAL METHOD BLD: ABNORMAL
EOSINOPHIL # BLD: 0 K/UL (ref 0–0.8)
EOSINOPHIL NFR BLD: 1 % (ref 0.5–7.8)
ERYTHROCYTE [DISTWIDTH] IN BLOOD BY AUTOMATED COUNT: 13.5 % (ref 11.9–14.6)
GLOBULIN SER CALC-MCNC: 3.3 G/DL (ref 2.8–4.5)
GLUCOSE SERPL-MCNC: 92 MG/DL (ref 65–100)
HCT VFR BLD AUTO: 42.4 % (ref 35.8–46.3)
HDLC SERPL-MCNC: 99 MG/DL (ref 40–60)
HDLC SERPL: 2.4
HGB BLD-MCNC: 13.6 G/DL (ref 11.7–15.4)
IMM GRANULOCYTES # BLD AUTO: 0 K/UL (ref 0–0.5)
IMM GRANULOCYTES NFR BLD AUTO: 0 % (ref 0–5)
LDLC SERPL CALC-MCNC: 121.8 MG/DL
LYMPHOCYTES # BLD: 1.9 K/UL (ref 0.5–4.6)
LYMPHOCYTES NFR BLD: 46 % (ref 13–44)
MCH RBC QN AUTO: 32 PG (ref 26.1–32.9)
MCHC RBC AUTO-ENTMCNC: 32.1 G/DL (ref 31.4–35)
MCV RBC AUTO: 99.8 FL (ref 82–102)
MONOCYTES # BLD: 0.3 K/UL (ref 0.1–1.3)
MONOCYTES NFR BLD: 7 % (ref 4–12)
NEUTS SEG # BLD: 1.9 K/UL (ref 1.7–8.2)
NEUTS SEG NFR BLD: 46 % (ref 43–78)
NRBC # BLD: 0 K/UL (ref 0–0.2)
PLATELET # BLD AUTO: 209 K/UL (ref 150–450)
PMV BLD AUTO: 11.9 FL (ref 9.4–12.3)
POTASSIUM SERPL-SCNC: 3.8 MMOL/L (ref 3.5–5.1)
PROT SERPL-MCNC: 7.7 G/DL (ref 6.3–8.2)
RBC # BLD AUTO: 4.25 M/UL (ref 4.05–5.2)
SODIUM SERPL-SCNC: 144 MMOL/L (ref 136–146)
TRIGL SERPL-MCNC: 66 MG/DL (ref 35–150)
VLDLC SERPL CALC-MCNC: 13.2 MG/DL (ref 6–23)
WBC # BLD AUTO: 4.1 K/UL (ref 4.3–11.1)

## 2024-02-21 PROBLEM — K57.30 DIVERTICULOSIS OF SIGMOID COLON: Status: ACTIVE | Noted: 2017-05-20

## 2024-02-21 RX ORDER — LORATADINE 10 MG/1
10 TABLET ORAL DAILY PRN
Qty: 30 TABLET | Refills: 5 | Status: CANCELLED | OUTPATIENT
Start: 2024-02-21

## 2024-02-21 NOTE — PROGRESS NOTES
Noni Alcantar (:  1935) is a 88 y.o. female,Established patient, here for evaluation of the following chief complaint(s):  Hypertension (Pt is here for a 6 month routine f/u with lab review. ) and Hyperlipidemia         ASSESSMENT/PLAN:  1. Hyperlipidemia LDL goal <100  -     Comprehensive Metabolic Panel; Future  -     Lipid Panel; Future  2. Seasonal allergic rhinitis, unspecified trigger  3. Atherosclerosis of native coronary artery of native heart with angina pectoris (HCC)  -     Lipid Panel; Future  4. Essential hypertension  -     CBC with Auto Differential; Future  -     Comprehensive Metabolic Panel; Future  -     Urinalysis with Reflex to Culture; Future  5. Vitamin D deficiency  -     Vitamin D 25 Hydroxy; Future      Patient Instructions   Counseled to reduce intake of high fat/cholesterol foods such as fried foods, red meats, and certain dairy products (cheese, ice cream, butter/margarine, egg yolks, whole milk products) to help reduce cholesterol values - consider frozen yogurt or dairy alternative ice cream for a frozen treat  Encouraged to seek heart healthy/low fat meals from meal delivery program  Continue chronic medications as prescribed  Monitor blood pressure 2 times/week and keep record to bring to next appointment  Reminded to stay well hydrated with plenty of water        Return in about 6 months (around 2024) for MWE + routine f/u.         Subjective   SUBJECTIVE/OBJECTIVE:  HPI  The patient is a 88 y.o. female who is seen for evaluation of hyperlipidemia.  She was tested because of hyperlipidemia w/ LDL goal < 100 + CAD.  The current state of this condition is needs to follow diet more regularly and poorly controlled on Optimal Omega x 2 supplements daily - taking as prescribed.  She admits to eating more ice cream lately and not being as careful with her diet.  Additionally, she has been receiving meals from a Meals on Wheels type program.       Last Lipid Panel:   Lab

## 2024-02-22 ENCOUNTER — OFFICE VISIT (OUTPATIENT)
Dept: INTERNAL MEDICINE CLINIC | Facility: CLINIC | Age: 89
End: 2024-02-22
Payer: MEDICARE

## 2024-02-22 ENCOUNTER — TELEPHONE (OUTPATIENT)
Dept: INTERNAL MEDICINE CLINIC | Facility: CLINIC | Age: 89
End: 2024-02-22

## 2024-02-22 VITALS
DIASTOLIC BLOOD PRESSURE: 76 MMHG | OXYGEN SATURATION: 97 % | SYSTOLIC BLOOD PRESSURE: 134 MMHG | WEIGHT: 122 LBS | HEART RATE: 65 BPM | HEIGHT: 60 IN | TEMPERATURE: 98.2 F | BODY MASS INDEX: 23.95 KG/M2

## 2024-02-22 DIAGNOSIS — J30.2 SEASONAL ALLERGIC RHINITIS, UNSPECIFIED TRIGGER: ICD-10-CM

## 2024-02-22 DIAGNOSIS — E78.5 HYPERLIPIDEMIA LDL GOAL <100: Primary | ICD-10-CM

## 2024-02-22 DIAGNOSIS — E55.9 VITAMIN D DEFICIENCY: ICD-10-CM

## 2024-02-22 DIAGNOSIS — I10 ESSENTIAL HYPERTENSION: ICD-10-CM

## 2024-02-22 DIAGNOSIS — I25.119 ATHEROSCLEROSIS OF NATIVE CORONARY ARTERY OF NATIVE HEART WITH ANGINA PECTORIS (HCC): ICD-10-CM

## 2024-02-22 PROCEDURE — 99214 OFFICE O/P EST MOD 30 MIN: CPT | Performed by: PHYSICIAN ASSISTANT

## 2024-02-22 PROCEDURE — G8420 CALC BMI NORM PARAMETERS: HCPCS | Performed by: PHYSICIAN ASSISTANT

## 2024-02-22 PROCEDURE — G8427 DOCREV CUR MEDS BY ELIG CLIN: HCPCS | Performed by: PHYSICIAN ASSISTANT

## 2024-02-22 PROCEDURE — 1123F ACP DISCUSS/DSCN MKR DOCD: CPT | Performed by: PHYSICIAN ASSISTANT

## 2024-02-22 PROCEDURE — 1090F PRES/ABSN URINE INCON ASSESS: CPT | Performed by: PHYSICIAN ASSISTANT

## 2024-02-22 PROCEDURE — 1036F TOBACCO NON-USER: CPT | Performed by: PHYSICIAN ASSISTANT

## 2024-02-22 PROCEDURE — G8484 FLU IMMUNIZE NO ADMIN: HCPCS | Performed by: PHYSICIAN ASSISTANT

## 2024-02-22 ASSESSMENT — ENCOUNTER SYMPTOMS: SHORTNESS OF BREATH: 1

## 2024-02-22 NOTE — TELEPHONE ENCOUNTER
Pt states that [facility] did not get to complete her OT sessions and they need a new referral placed by original provider who placed her HH order for them to complete OT. She states that they did complete PT.

## 2024-02-22 NOTE — PATIENT INSTRUCTIONS
Counseled to reduce intake of high fat/cholesterol foods such as fried foods, red meats, and certain dairy products (cheese, ice cream, butter/margarine, egg yolks, whole milk products) to help reduce cholesterol values - consider frozen yogurt or dairy alternative ice cream for a frozen treat  Encouraged to seek heart healthy/low fat meals from meal delivery program  Continue chronic medications as prescribed  Monitor blood pressure 2 times/week and keep record to bring to next appointment  Reminded to stay well hydrated with plenty of water

## 2024-02-23 NOTE — TELEPHONE ENCOUNTER
Per Dianna Alicea NP:   I put in the referral for home health for PT. Does OT think she would benefit from their services?

## 2024-02-23 NOTE — TELEPHONE ENCOUNTER
When she was receiving PT services with HH, she was evaluated for a need for OT and I'm assuming HH received some type of order (possibly verbal? But I see no documentation of that) to be able to proceed with OT, but they were unable to complete her visits.

## 2024-03-26 NOTE — TELEPHONE ENCOUNTER
Ok.  I think she was doing well at last visit following her PT services so we will hold off for now and may revisit this again in the future.

## 2024-03-26 NOTE — TELEPHONE ENCOUNTER
I'm not clear on why she needs OT services.  Patient was doing well when I saw her recently.  Can the home health agency send over documentation of why OT is/was being recommended?

## 2024-03-26 NOTE — TELEPHONE ENCOUNTER
[facility]'s last order/progress note from 11/8/23 stated that OT is to provide education related to safety in the home, fall prevention, establish a home exercide program and provide therapeutic exercises and/or soft tissue/joint mobilization to restore functional strength and ROM, instruct on safe transfers using appropriate body mechanics and equipment, evaluate functional mobility/ambulation and provide training using appropriate assistive devices for safety, ADL training to increase independence, energy conservation technique instructions to maximize pt's tolerance during ADL's, provide balance training to reduce fall risk, and evaluate pt for overall OT services and develop plan of care.

## 2024-04-29 ENCOUNTER — TELEPHONE (OUTPATIENT)
Dept: INTERNAL MEDICINE CLINIC | Facility: CLINIC | Age: 89
End: 2024-04-29

## 2024-04-29 ENCOUNTER — OFFICE VISIT (OUTPATIENT)
Dept: INTERNAL MEDICINE CLINIC | Facility: CLINIC | Age: 89
End: 2024-04-29
Payer: MEDICARE

## 2024-04-29 ENCOUNTER — HOSPITAL ENCOUNTER (OUTPATIENT)
Dept: GENERAL RADIOLOGY | Age: 89
Discharge: HOME | End: 2024-05-02
Payer: MEDICARE

## 2024-04-29 VITALS
OXYGEN SATURATION: 98 % | HEIGHT: 60 IN | DIASTOLIC BLOOD PRESSURE: 98 MMHG | TEMPERATURE: 98.4 F | BODY MASS INDEX: 22.78 KG/M2 | HEART RATE: 75 BPM | WEIGHT: 116 LBS | SYSTOLIC BLOOD PRESSURE: 160 MMHG

## 2024-04-29 DIAGNOSIS — R30.0 DYSURIA: ICD-10-CM

## 2024-04-29 DIAGNOSIS — I10 ELEVATED BLOOD PRESSURE READING IN OFFICE WITH DIAGNOSIS OF HYPERTENSION: ICD-10-CM

## 2024-04-29 DIAGNOSIS — R82.90 ABNORMAL FINDING ON URINALYSIS: ICD-10-CM

## 2024-04-29 DIAGNOSIS — R10.84 GENERALIZED ABDOMINAL PAIN: ICD-10-CM

## 2024-04-29 DIAGNOSIS — K59.01 CONSTIPATION BY DELAYED COLONIC TRANSIT: ICD-10-CM

## 2024-04-29 DIAGNOSIS — R10.84 GENERALIZED ABDOMINAL PAIN: Primary | ICD-10-CM

## 2024-04-29 DIAGNOSIS — J30.2 SEASONAL ALLERGIC RHINITIS, UNSPECIFIED TRIGGER: ICD-10-CM

## 2024-04-29 DIAGNOSIS — R53.1 WEAKNESS: ICD-10-CM

## 2024-04-29 DIAGNOSIS — R06.09 DYSPNEA ON EXERTION: ICD-10-CM

## 2024-04-29 DIAGNOSIS — I25.119 ATHEROSCLEROSIS OF NATIVE CORONARY ARTERY OF NATIVE HEART WITH ANGINA PECTORIS (HCC): ICD-10-CM

## 2024-04-29 LAB
ALBUMIN SERPL-MCNC: 4 G/DL (ref 3.2–4.6)
ALBUMIN/GLOB SERPL: 1.3 (ref 1–1.9)
ALP SERPL-CCNC: 119 U/L (ref 35–104)
ALT SERPL-CCNC: 43 U/L (ref 12–65)
ANION GAP SERPL CALC-SCNC: 11 MMOL/L (ref 9–18)
AST SERPL-CCNC: 49 U/L (ref 15–37)
BASOPHILS # BLD: 0 K/UL (ref 0–0.2)
BASOPHILS NFR BLD: 0 % (ref 0–2)
BILIRUB SERPL-MCNC: 0.3 MG/DL (ref 0–1.2)
BILIRUBIN, URINE, POC: NEGATIVE
BLOOD URINE, POC: ABNORMAL
BUN SERPL-MCNC: 17 MG/DL (ref 8–23)
CALCIUM SERPL-MCNC: 9.8 MG/DL (ref 8.8–10.2)
CHLORIDE SERPL-SCNC: 103 MMOL/L (ref 98–107)
CO2 SERPL-SCNC: 27 MMOL/L (ref 20–28)
CREAT SERPL-MCNC: 0.81 MG/DL (ref 0.6–1.1)
DIFFERENTIAL METHOD BLD: ABNORMAL
EOSINOPHIL # BLD: 0 K/UL (ref 0–0.8)
EOSINOPHIL NFR BLD: 0 % (ref 0.5–7.8)
ERYTHROCYTE [DISTWIDTH] IN BLOOD BY AUTOMATED COUNT: 13.2 % (ref 11.9–14.6)
GLOBULIN SER CALC-MCNC: 3 G/DL (ref 2.3–3.5)
GLUCOSE SERPL-MCNC: 91 MG/DL (ref 70–99)
GLUCOSE URINE, POC: NEGATIVE
HCT VFR BLD AUTO: 38.5 % (ref 35.8–46.3)
HGB BLD-MCNC: 12.4 G/DL (ref 11.7–15.4)
IMM GRANULOCYTES # BLD AUTO: 0 K/UL (ref 0–0.5)
IMM GRANULOCYTES NFR BLD AUTO: 0 % (ref 0–5)
KETONES, URINE, POC: NEGATIVE
LEUKOCYTE ESTERASE, URINE, POC: ABNORMAL
LYMPHOCYTES # BLD: 1.6 K/UL (ref 0.5–4.6)
LYMPHOCYTES NFR BLD: 32 % (ref 13–44)
MCH RBC QN AUTO: 31.6 PG (ref 26.1–32.9)
MCHC RBC AUTO-ENTMCNC: 32.2 G/DL (ref 31.4–35)
MCV RBC AUTO: 98.2 FL (ref 82–102)
MONOCYTES # BLD: 0.4 K/UL (ref 0.1–1.3)
MONOCYTES NFR BLD: 8 % (ref 4–12)
NEUTS SEG # BLD: 2.9 K/UL (ref 1.7–8.2)
NEUTS SEG NFR BLD: 60 % (ref 43–78)
NITRITE, URINE, POC: NEGATIVE
NRBC # BLD: 0 K/UL (ref 0–0.2)
PH, URINE, POC: 7 (ref 4.6–8)
PLATELET # BLD AUTO: 261 K/UL (ref 150–450)
PMV BLD AUTO: 11.3 FL (ref 9.4–12.3)
POTASSIUM SERPL-SCNC: 4.2 MMOL/L (ref 3.5–5.1)
PROT SERPL-MCNC: 7 G/DL (ref 6.3–8.2)
PROTEIN,URINE, POC: NEGATIVE
RBC # BLD AUTO: 3.92 M/UL (ref 4.05–5.2)
SODIUM SERPL-SCNC: 141 MMOL/L (ref 136–145)
SPECIFIC GRAVITY, URINE, POC: 1.01 (ref 1–1.03)
URINALYSIS CLARITY, POC: CLEAR
URINALYSIS COLOR, POC: YELLOW
UROBILINOGEN, POC: ABNORMAL
WBC # BLD AUTO: 4.9 K/UL (ref 4.3–11.1)

## 2024-04-29 PROCEDURE — 1090F PRES/ABSN URINE INCON ASSESS: CPT | Performed by: PHYSICIAN ASSISTANT

## 2024-04-29 PROCEDURE — 74018 RADEX ABDOMEN 1 VIEW: CPT

## 2024-04-29 PROCEDURE — 1123F ACP DISCUSS/DSCN MKR DOCD: CPT | Performed by: PHYSICIAN ASSISTANT

## 2024-04-29 PROCEDURE — 1036F TOBACCO NON-USER: CPT | Performed by: PHYSICIAN ASSISTANT

## 2024-04-29 PROCEDURE — G8420 CALC BMI NORM PARAMETERS: HCPCS | Performed by: PHYSICIAN ASSISTANT

## 2024-04-29 PROCEDURE — 81003 URINALYSIS AUTO W/O SCOPE: CPT | Performed by: PHYSICIAN ASSISTANT

## 2024-04-29 PROCEDURE — 99214 OFFICE O/P EST MOD 30 MIN: CPT | Performed by: PHYSICIAN ASSISTANT

## 2024-04-29 PROCEDURE — G8427 DOCREV CUR MEDS BY ELIG CLIN: HCPCS | Performed by: PHYSICIAN ASSISTANT

## 2024-04-29 RX ORDER — FAMOTIDINE 20 MG/1
20 TABLET, FILM COATED ORAL 2 TIMES DAILY
Qty: 60 TABLET | Refills: 1 | Status: SHIPPED | OUTPATIENT
Start: 2024-04-29

## 2024-04-29 ASSESSMENT — ENCOUNTER SYMPTOMS
SINUS PAIN: 1
SORE THROAT: 1
EYE REDNESS: 0
DIARRHEA: 0
ABDOMINAL PAIN: 1
EYE DISCHARGE: 1
EYE PAIN: 0
SHORTNESS OF BREATH: 1
NAUSEA: 0
VOMITING: 0
CONSTIPATION: 1
EYE ITCHING: 0
BLOOD IN STOOL: 0
RHINORRHEA: 1
ABDOMINAL DISTENTION: 0
VOICE CHANGE: 1

## 2024-04-29 NOTE — PATIENT INSTRUCTIONS
Start Famotidine 20 mg twice daily  Consider addition of Miralax to stimulate bowels if stool softeners are not sufficient  Emphasized the importance of staying well hydrated with plenty of water  Suggest trying pediatric dose of Zyrtec or Claritin for some milder relief of seasonal allergic rhinitis  Will work on getting branded Norvasc re-approved which will alleviate some of the symptoms she's experiencing since she has never done well with generic Amlodipine  Monitor blood pressure daily and keep record to bring to next appointment

## 2024-04-29 NOTE — TELEPHONE ENCOUNTER
----- Message from Sania Wilhelm PA-C sent at 2024 12:52 PM EDT -----  Can you work on getting branded Norvasc re-approved for her?  It looks like it  in Dec 2023 and pharmacy never notified us.  There should be good details in the chart as to why she can't take generic but let me know if I need to go digging for those details.

## 2024-04-29 NOTE — TELEPHONE ENCOUNTER
PA faxed to [employer] for Norvasc 10 mg tabs on 4/29/24 due to pt not found in CMM. Waiting on determination.

## 2024-04-29 NOTE — RESULT ENCOUNTER NOTE
Urine shows trace leukocytes so we are sending it off to be cultured to determine if it's infected.  I did look back and found the side effects she experienced previously on generic Amlodipine was worsened incontinence and worsened constipation so that could be a lot of what is going on with her now.  We will work on getting branded Norvasc approved again.

## 2024-04-30 DIAGNOSIS — R93.5 ABNORMAL ABDOMINAL X-RAY: Primary | ICD-10-CM

## 2024-04-30 DIAGNOSIS — K59.01 CONSTIPATION BY DELAYED COLONIC TRANSIT: ICD-10-CM

## 2024-04-30 DIAGNOSIS — R10.84 GENERALIZED ABDOMINAL PAIN: ICD-10-CM

## 2024-04-30 NOTE — TELEPHONE ENCOUNTER
Received fax notification that pt is no longer covered under [facility]. Pt now has [employer] HMO (fixed under pt registration). PA faxed to [facility] on 4/30/24 since PA could not be processed through [facility].

## 2024-05-01 NOTE — RESULT ENCOUNTER NOTE
Blood work looked unremarkable - AST (liver enzyme) is up a bit but it has done this before and then returned to normal.  Blood counts are normal - no signs of anemia or bacterial infection.  Still awaiting urine culture

## 2024-05-01 NOTE — RESULT ENCOUNTER NOTE
X-ray shows air filled loops in small & large bowel - this can suggest an early bowel obstruction.  I want her to see GI (will do stat referral) and go on a liquid diet for now until she sees them - can utilize Boost, Ensure, yogurt drinks, jello, chicken broth, etc.

## 2024-05-02 LAB
BACTERIA SPEC CULT: NORMAL
BACTERIA SPEC CULT: NORMAL
SERVICE CMNT-IMP: NORMAL

## 2024-05-02 NOTE — RESULT ENCOUNTER NOTE
Urine is clear - no signs of infection.  Looks like branded Norvasc was approved so please notify patient and have her get started back on that ASAP.  I would like to hear from them ~ 1 week after starting the branded Norvasc to know what symptoms are still problematic for her?  How is she feeling today?

## 2024-05-06 ENCOUNTER — TELEPHONE (OUTPATIENT)
Age: 89
End: 2024-05-06

## 2024-05-06 ENCOUNTER — TELEPHONE (OUTPATIENT)
Dept: INTERNAL MEDICINE CLINIC | Facility: CLINIC | Age: 89
End: 2024-05-06

## 2024-05-06 NOTE — TELEPHONE ENCOUNTER
Patient's dtr called asking for us to get pt in sooner than scheduled.  I explained that we were scheduling end of June into July and would be happy to place her on cancellation list.

## 2024-05-06 NOTE — TELEPHONE ENCOUNTER
Spoke with pt's daughter (Valarie) who states that she is concerned about pt being on a liquid only diet until she is able to get in with GI because she has lost 9 lbs since her last visit with Sania on April 29th. Pt does not have an appt with GI until May 23rd. Spoke with [facility] who is sending Melissa Grinnell, PA a message to see if they can get pt in any sooner and will return my call once Onelia responds to the message. Pt's daughter notified and verbalized understanding.

## 2024-05-06 NOTE — TELEPHONE ENCOUNTER
PCP called stating pt has lost 9 lbs since the 4/29/24, PCP is requesting pt to possibly to be seen sooner. Pt is scheduled for a new patient appointment on 5/23/24

## 2024-05-07 DIAGNOSIS — R10.84 GENERALIZED ABDOMINAL PAIN: ICD-10-CM

## 2024-05-07 DIAGNOSIS — K59.01 CONSTIPATION BY DELAYED COLONIC TRANSIT: ICD-10-CM

## 2024-05-07 DIAGNOSIS — R93.5 ABNORMAL ABDOMINAL X-RAY: Primary | ICD-10-CM

## 2024-05-07 NOTE — TELEPHONE ENCOUNTER
Spoke with pt's daughter, Valarie, who states that [facility] contacted her about 15 mins prior to [facility] contacting her and stated that they had a cancellation with Melissa Grinnell tomorrow at 11:15 AM and pt's daughter accepted that appt. She did not schedule an appt with [facility]. Pt's daughter states that she is not with her mother right now, so she is unable to ask her about her BM's, but states that her mother has not told her about any concerns recently with her BM's.

## 2024-05-07 NOTE — TELEPHONE ENCOUNTER
Let's try progressing to a soft diet with potatoes, pasta, yogurts, cottage cheese, etc and continue meal replacement drinks to keep caloric intake maintained.

## 2024-05-08 ENCOUNTER — OFFICE VISIT (OUTPATIENT)
Age: 89
End: 2024-05-08

## 2024-05-08 VITALS
RESPIRATION RATE: 17 BRPM | SYSTOLIC BLOOD PRESSURE: 128 MMHG | WEIGHT: 114 LBS | DIASTOLIC BLOOD PRESSURE: 58 MMHG | BODY MASS INDEX: 22.38 KG/M2 | OXYGEN SATURATION: 98 % | HEIGHT: 60 IN

## 2024-05-08 DIAGNOSIS — K62.4 ANAL STENOSIS: ICD-10-CM

## 2024-05-08 DIAGNOSIS — R13.19 ESOPHAGEAL DYSPHAGIA: ICD-10-CM

## 2024-05-08 DIAGNOSIS — K21.9 GASTROESOPHAGEAL REFLUX DISEASE, UNSPECIFIED WHETHER ESOPHAGITIS PRESENT: ICD-10-CM

## 2024-05-08 DIAGNOSIS — K59.00 CONSTIPATION, UNSPECIFIED CONSTIPATION TYPE: ICD-10-CM

## 2024-05-08 DIAGNOSIS — R10.84 GENERALIZED ABDOMINAL PAIN: Primary | ICD-10-CM

## 2024-05-08 RX ORDER — FAMOTIDINE 40 MG/1
40 TABLET, FILM COATED ORAL NIGHTLY
Qty: 90 TABLET | Refills: 0 | Status: SHIPPED | OUTPATIENT
Start: 2024-05-08

## 2024-05-08 RX ORDER — OMEPRAZOLE 40 MG/1
40 CAPSULE, DELAYED RELEASE ORAL
Qty: 90 CAPSULE | Refills: 0 | Status: SHIPPED | OUTPATIENT
Start: 2024-05-08

## 2024-05-08 NOTE — PATIENT INSTRUCTIONS
For reflux:   Eat smaller and more frequent meals. Avoid lying down for 3 hours after eating. Elevate the head of the bed by 6 inches. Avoid wearing tight-fitting clothing. Stop smoking and avoid alcohol. Avoid NSAID medications (Aspirin, Excedrin, Aleve, Ibuprofen, Goody Powder, Toradol, Mobic, Voltaren, etc). Consider weight loss to get to a healthy weight, which is often critical to eliminating symptoms of reflux. Avoid foods and acid-containing beverages that can exacerbate the symptoms of reflux. This includes:     -Caffeine  -Chocolate  -Peppermint  -Alcohol (especially red wine)  -Carbonated beverages  -Citrus fruits  -Tomato-based products  -Vinegar  -Spicy and greasy foods     For constipation:  Recommend Miralax 1 cap 1-2 x daily and stool softener (ie Pericolace) twice daily. You can add milk of magnesia or magnesium citrate as needed.  Add Dulcolax or glycerin suppository if no bowel movement after 2-3 days.    Increase daily fiber intake to 20-30 grams daily either by dietary intake or an over-the-counter supplement such as Citrucel or Metamucil. Limit alcohol and fatty food intake. Drink at least 80 oz water daily or more as needed to maintain adequate hydration. Exercise regularly and consider weight loss if appropriate based on your current weight. Avoid straining or lingering on the toilet longer than necessary. Try scheduled toilet time approximately 30 minutes after your first drink or meal of the day. Elevate your feet when toileting to bring your knees in line with your hips using a small stool or Squatty Potty. Review your medication list and discuss with your PCP whether any of these medications may exacerbate constipation.

## 2024-05-08 NOTE — PROGRESS NOTES
Aspirin Other (See Comments)     Stomach burn with uncoated aspirin; pt takes coated 81 mg aspirin daily    Diclofenac Sodium Other (See Comments)    Peg Ointment Base [Polyethylene Glycol] Other (See Comments)     Stomach cramps/cp    Pregabalin Other (See Comments)     inflammation    Soy Itching    Ticagrelor Other (See Comments)     Pt get bruising, dry mouth, cant sleep, constipation, itching    Tramadol Nausea Only    Atorvastatin Other (See Comments), Rash and Swelling     Joint swelling        Family History   Problem Relation Age of Onset    Colon Cancer Mother     Hypertension Mother     Alzheimer's Disease Mother     Stroke Father 90    Cancer Sister     Breast Cancer Sister     Diabetes Sister     Osteoarthritis Sister     Cancer Sister         Bone    Breast Cancer Sister     Alcohol Abuse Sister     Alzheimer's Disease Brother     Cancer Brother     Dementia Brother     Seizures Brother     Alcohol Abuse Brother     Alcohol Abuse Brother     Colon Cancer Brother     Coronary Art Dis Brother     Osteoarthritis Brother     Other Brother         PAD    Seizures Brother     No Known Problems Maternal Grandmother     No Known Problems Maternal Grandfather     Breast Cancer Daughter        Current Outpatient Medications   Medication Sig Dispense Refill    famotidine (PEPCID) 40 MG tablet Take 1 tablet by mouth at bedtime 90 tablet 0    omeprazole (PRILOSEC) 40 MG delayed release capsule Take 1 capsule by mouth every morning (before breakfast) 90 capsule 0    aluminum hydroxide-magnesium carbonate (GAVISCON)  MG/15ML suspension Take 15 mLs by mouth 4 times daily as needed for Indigestion 355 mL 0    NORVASC 10 MG tablet Take 1 tablet by mouth every morning Brand medically neccessary! 90 tablet 3    Misc. 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

## 2024-05-09 ENCOUNTER — TELEPHONE (OUTPATIENT)
Dept: INTERNAL MEDICINE CLINIC | Facility: CLINIC | Age: 89
End: 2024-05-09

## 2024-05-09 NOTE — TELEPHONE ENCOUNTER
----- Message from Sania Wilhelm PA-C sent at 5/9/2024 11:13 AM EDT -----  Please schedule patient for a recheck with me in 1-2 weeks.  I want them to specifically bring her prescription bottle for Norvasc so I can tell exactly how long she's been back on the branded product.  GI note says she's still having headaches which we need to look into if switching back to branded Norvasc did not resolve that problem.      ----- Message -----  From: Grinnell, Melissa R, PA-C  Sent: 5/8/2024  12:15 PM EDT  To: Sania Wilhelm PA-C

## 2024-06-24 ENCOUNTER — APPOINTMENT (OUTPATIENT)
Dept: CT IMAGING | Age: 89
End: 2024-06-24
Payer: OTHER MISCELLANEOUS

## 2024-06-24 ENCOUNTER — HOSPITAL ENCOUNTER (EMERGENCY)
Age: 89
Discharge: HOME | End: 2024-06-24
Payer: OTHER MISCELLANEOUS

## 2024-06-24 ENCOUNTER — APPOINTMENT (OUTPATIENT)
Dept: GENERAL RADIOLOGY | Age: 89
End: 2024-06-24
Payer: OTHER MISCELLANEOUS

## 2024-06-24 VITALS
TEMPERATURE: 97.9 F | HEART RATE: 78 BPM | SYSTOLIC BLOOD PRESSURE: 132 MMHG | WEIGHT: 114 LBS | OXYGEN SATURATION: 98 % | RESPIRATION RATE: 18 BRPM | HEIGHT: 60 IN | DIASTOLIC BLOOD PRESSURE: 73 MMHG | BODY MASS INDEX: 22.38 KG/M2
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
## 2024-06-24 DIAGNOSIS — N39.0 ACUTE UTI: ICD-10-CM

## 2024-06-24 LAB
BACTERIA URNS QL MICRO: NEGATIVE /HPF
BILIRUB UR QL: NEGATIVE
EPI CELLS #/AREA URNS HPF: ABNORMAL /HPF
GLUCOSE UR QL STRIP.AUTO: NEGATIVE MG/DL
HYALINE CASTS URNS QL MICRO: ABNORMAL /LPF
KETONES UR-MCNC: NEGATIVE MG/DL
LEUKOCYTE ESTERASE UR QL STRIP: ABNORMAL
NITRITE UR QL: NEGATIVE
PH UR: 7 (ref 5–9)
PROT UR QL: NEGATIVE MG/DL
RBC # UR STRIP: ABNORMAL
RBC #/AREA URNS HPF: ABNORMAL /HPF
SERVICE CMNT-IMP: ABNORMAL
SP GR UR: 1.01 (ref 1–1.02)
UROBILINOGEN UR QL: 0.2 EU/DL (ref 0.2–1)
WBC URNS QL MICRO: ABNORMAL /HPF

## 2024-06-24 PROCEDURE — 70450 CT HEAD/BRAIN W/O DYE: CPT

## 2024-06-24 PROCEDURE — 87086 URINE CULTURE/COLONY COUNT: CPT

## 2024-06-24 PROCEDURE — 71046 X-RAY EXAM CHEST 2 VIEWS: CPT

## 2024-06-24 PROCEDURE — 99284 EMERGENCY DEPT VISIT MOD MDM: CPT

## 2024-06-24 PROCEDURE — 81001 URINALYSIS AUTO W/SCOPE: CPT

## 2024-06-24 RX ORDER — NITROFURANTOIN 25; 75 MG/1; MG/1
100 CAPSULE ORAL 2 TIMES DAILY
Qty: 10 CAPSULE | Refills: 0 | Status: SHIPPED | OUTPATIENT
Start: 2024-06-24 | End: 2024-06-29

## 2024-06-24 ASSESSMENT — ENCOUNTER SYMPTOMS
BACK PAIN: 0
SHORTNESS OF BREATH: 0
ABDOMINAL PAIN: 0
CONTUSION: 0

## 2024-06-24 ASSESSMENT — PAIN - FUNCTIONAL ASSESSMENT: PAIN_FUNCTIONAL_ASSESSMENT: 0-10

## 2024-06-24 ASSESSMENT — PAIN SCALES - GENERAL: PAINLEVEL_OUTOF10: 0

## 2024-06-24 NOTE — DISCHARGE INSTRUCTIONS
Your urine is concerning for a urinary tract infection. I have sent it for a culture but will go ahead and send in antibiotics for you as well. Take medication as prescribed.     As we discussed, your CT scan and x-ray today are both normal.  Take Tylenol or Motrin if needed for pain.  Please follow-up with your primary care provider for recheck of your symptoms.  Return to the emergency department for any new, worsening, or concerning symptoms.

## 2024-06-24 NOTE — ED PROVIDER NOTES
altered mental status, no back pain, no bruising, no dizziness, no immovable extremity, no loss of consciousness, no neck pain and no shortness of breath      Physical Exam     Vitals signs and nursing note reviewed:  Vitals:    06/24/24 1540 06/24/24 1830   BP: (!) 155/90 132/73   Pulse: 83 78   Resp: 18 18   Temp: 98.3 °F (36.8 °C) 97.9 °F (36.6 °C)   TempSrc: Oral Oral   SpO2: 100% 98%   Weight: 51.7 kg (114 lb)    Height: 1.524 m (5')       Physical Exam  Vitals and nursing note reviewed.   Constitutional:       General: She is not in acute distress.     Appearance: Normal appearance. She is well-developed. She is not ill-appearing, toxic-appearing or diaphoretic.   HENT:      Head: Normocephalic and atraumatic.   Eyes:      General: No scleral icterus.  Cardiovascular:      Rate and Rhythm: Normal rate.      Heart sounds: Normal heart sounds.   Pulmonary:      Effort: Pulmonary effort is normal. No respiratory distress.      Breath sounds: Normal breath sounds.   Musculoskeletal:      Cervical back: No bony tenderness.      Thoracic back: No bony tenderness.      Lumbar back: No bony tenderness.      Comments: No midline tenderness with palpation of spine.  Patient is ambulatory with normal, steady gait using her cane.  She appears to be moving all extremities without difficulty at this time.   Neurological:      General: No focal deficit present.      Mental Status: She is alert and oriented to person, place, and time.        Procedures     Procedures    Orders Placed This Encounter   Procedures    Culture, Urine    CT HEAD WO CONTRAST    XR CHEST (2 VW)    Urinalysis, Micro    POCT Urine Dipstick    POCT Urinalysis no Micro        Medications given during this emergency department visit:  Medications - No data to display    Discharge Medication List as of 6/24/2024  6:38 PM        START taking these medications    Details   nitrofurantoin, macrocrystal-monohydrate, (MACROBID) 100 MG capsule Take 1 capsule by
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
## 2024-06-24 NOTE — ED NOTES
Patient mobility status  with no difficulty. Provider aware     I have reviewed discharge instructions with the patient.  The patient verbalized understanding.    Patient left ED via Discharge Method: ambulatory to Home with Child.    Opportunity for questions and clarification provided.     Patient given 1 scripts.           Esha Stern RN  06/24/24 [id number]

## 2024-06-27 LAB
BACTERIA SPEC CULT: NORMAL
BACTERIA SPEC CULT: NORMAL
SERVICE CMNT-IMP: NORMAL

## 2024-07-03 NOTE — TELEPHONE ENCOUNTER
Airway  Date/Time: 7/2/2024 8:27 PM  End Time:7/2/2024 8:27 PM  Airway not difficult    General Information and Staff    Patient location during procedure: OR  Anesthesiologist: Henri South MD    Indications and Patient Condition  Indications for airway management: airway protection    Preoxygenated: yes  Mask difficulty assessment: 0 - not attempted    Final Airway Details  Final airway type: supraglottic airway      Successful airway: classic  Size 5     Number of attempts at approach: 1             Please advise.

## 2024-07-08 ENCOUNTER — TELEPHONE (OUTPATIENT)
Dept: INTERNAL MEDICINE CLINIC | Facility: CLINIC | Age: 89
End: 2024-07-08

## 2024-07-08 NOTE — TELEPHONE ENCOUNTER
Spoke with [facility] who states that pt requested the diabetic nutritional supplement drinks because the regular ones were too sweet. [facility] is faxing us over a new order explaining this for provider to sign and fax back.

## 2024-07-08 NOTE — TELEPHONE ENCOUNTER
[facility] is requesting verbal approval from pt's PCP for them to administer a diabetic nutritional supplement to pt. Please advise.

## 2024-07-08 NOTE — TELEPHONE ENCOUNTER
She can definitely have a nutritional supplement drink but why diabetic?  She does not have diabetes or any history of elevated blood sugar on our records so would recommend a regular nutritional supplement drink instead.

## 2024-07-15 NOTE — PROGRESS NOTES
to stop or control worrying Not at all   Worrying too much about different things More than half the days   Trouble relaxing Not at all   Being so restless that it is hard to sit still Not at all   Becoming easily annoyed or irritable Not at all   Feeling afraid as if something awful might happen Not at all   ANURAG-7 Total Score 5   How difficult have these problems made it for you to do your work, take care of things at home, or get along with other people? Not difficult at all          On this date 7/18/2024 I have spent 33 minutes reviewing previous notes, test results and face to face with the patient discussing the diagnosis and importance of compliance with the treatment plan as well as documenting on the day of the visit.      An electronic signature was used to authenticate this note.    --Sania Wilhelm PA-C

## 2024-07-18 ENCOUNTER — OFFICE VISIT (OUTPATIENT)
Dept: INTERNAL MEDICINE CLINIC | Facility: CLINIC | Age: 89
End: 2024-07-18
Payer: MEDICARE

## 2024-07-18 VITALS
DIASTOLIC BLOOD PRESSURE: 80 MMHG | TEMPERATURE: 97.5 F | OXYGEN SATURATION: 98 % | HEART RATE: 66 BPM | SYSTOLIC BLOOD PRESSURE: 130 MMHG | HEIGHT: 60 IN | BODY MASS INDEX: 22.97 KG/M2 | WEIGHT: 117 LBS

## 2024-07-18 DIAGNOSIS — K21.9 GASTROESOPHAGEAL REFLUX DISEASE, UNSPECIFIED WHETHER ESOPHAGITIS PRESENT: ICD-10-CM

## 2024-07-18 DIAGNOSIS — K59.01 CONSTIPATION BY DELAYED COLONIC TRANSIT: ICD-10-CM

## 2024-07-18 DIAGNOSIS — I10 ESSENTIAL HYPERTENSION: Primary | ICD-10-CM

## 2024-07-18 PROCEDURE — 1123F ACP DISCUSS/DSCN MKR DOCD: CPT | Performed by: PHYSICIAN ASSISTANT

## 2024-07-18 PROCEDURE — G2211 COMPLEX E/M VISIT ADD ON: HCPCS | Performed by: PHYSICIAN ASSISTANT

## 2024-07-18 PROCEDURE — 99214 OFFICE O/P EST MOD 30 MIN: CPT | Performed by: PHYSICIAN ASSISTANT

## 2024-07-18 RX ORDER — POLYETHYLENE GLYCOL 3350 17 G/17G
17 POWDER, FOR SOLUTION ORAL DAILY
COMMUNITY
End: 2024-07-18

## 2024-07-18 SDOH — ECONOMIC STABILITY: FOOD INSECURITY: WITHIN THE PAST 12 MONTHS, YOU WORRIED THAT YOUR FOOD WOULD RUN OUT BEFORE YOU GOT MONEY TO BUY MORE.: NEVER TRUE

## 2024-07-18 SDOH — ECONOMIC STABILITY: INCOME INSECURITY: HOW HARD IS IT FOR YOU TO PAY FOR THE VERY BASICS LIKE FOOD, HOUSING, MEDICAL CARE, AND HEATING?: SOMEWHAT HARD

## 2024-07-18 SDOH — ECONOMIC STABILITY: FOOD INSECURITY: WITHIN THE PAST 12 MONTHS, THE FOOD YOU BOUGHT JUST DIDN'T LAST AND YOU DIDN'T HAVE MONEY TO GET MORE.: NEVER TRUE

## 2024-07-18 ASSESSMENT — ENCOUNTER SYMPTOMS
VOMITING: 0
NAUSEA: 0
ABDOMINAL PAIN: 0
SHORTNESS OF BREATH: 1
ABDOMINAL DISTENTION: 1
BLOOD IN STOOL: 0
DIARRHEA: 0
CONSTIPATION: 1

## 2024-07-18 ASSESSMENT — ANXIETY QUESTIONNAIRES
7. FEELING AFRAID AS IF SOMETHING AWFUL MIGHT HAPPEN: NOT AT ALL
GAD7 TOTAL SCORE: 5
6. BECOMING EASILY ANNOYED OR IRRITABLE: NOT AT ALL
2. NOT BEING ABLE TO STOP OR CONTROL WORRYING: NOT AT ALL
4. TROUBLE RELAXING: NOT AT ALL
5. BEING SO RESTLESS THAT IT IS HARD TO SIT STILL: NOT AT ALL
3. WORRYING TOO MUCH ABOUT DIFFERENT THINGS: MORE THAN HALF THE DAYS
IF YOU CHECKED OFF ANY PROBLEMS ON THIS QUESTIONNAIRE, HOW DIFFICULT HAVE THESE PROBLEMS MADE IT FOR YOU TO DO YOUR WORK, TAKE CARE OF THINGS AT HOME, OR GET ALONG WITH OTHER PEOPLE: NOT DIFFICULT AT ALL
1. FEELING NERVOUS, ANXIOUS, OR ON EDGE: NEARLY EVERY DAY

## 2024-07-18 ASSESSMENT — PATIENT HEALTH QUESTIONNAIRE - PHQ9
7. TROUBLE CONCENTRATING ON THINGS, SUCH AS READING THE NEWSPAPER OR WATCHING TELEVISION: NOT AT ALL
10. IF YOU CHECKED OFF ANY PROBLEMS, HOW DIFFICULT HAVE THESE PROBLEMS MADE IT FOR YOU TO DO YOUR WORK, TAKE CARE OF THINGS AT HOME, OR GET ALONG WITH OTHER PEOPLE: SOMEWHAT DIFFICULT
SUM OF ALL RESPONSES TO PHQ QUESTIONS 1-9: 4
5. POOR APPETITE OR OVEREATING: SEVERAL DAYS
3. TROUBLE FALLING OR STAYING ASLEEP: SEVERAL DAYS
SUM OF ALL RESPONSES TO PHQ QUESTIONS 1-9: 4
2. FEELING DOWN, DEPRESSED OR HOPELESS: SEVERAL DAYS
SUM OF ALL RESPONSES TO PHQ QUESTIONS 1-9: 4
4. FEELING TIRED OR HAVING LITTLE ENERGY: SEVERAL DAYS
6. FEELING BAD ABOUT YOURSELF - OR THAT YOU ARE A FAILURE OR HAVE LET YOURSELF OR YOUR FAMILY DOWN: NOT AT ALL
8. MOVING OR SPEAKING SO SLOWLY THAT OTHER PEOPLE COULD HAVE NOTICED. OR THE OPPOSITE, BEING SO FIGETY OR RESTLESS THAT YOU HAVE BEEN MOVING AROUND A LOT MORE THAN USUAL: NOT AT ALL
9. THOUGHTS THAT YOU WOULD BE BETTER OFF DEAD, OR OF HURTING YOURSELF: NOT AT ALL
SUM OF ALL RESPONSES TO PHQ QUESTIONS 1-9: 4

## 2024-07-18 NOTE — PATIENT INSTRUCTIONS
Trial discontinuation of Famotidine nightly.  Remain on Omeprazole every morning until discussing with GI to determine if she needs to remain on this long term  Encouraged to make sure she is staying well hydrated with plenty of water since dehydration can contribute to headaches as well  Reassured that CT Head was clear and with headaches improving that is good  Completed Medically Essential re-enrollment forms for [employer]  Advised that she needs to call and get follow-up appointments scheduled with GI & cardiology - office contact number provided to patient and daughter for their convenience  Consider looking into [facility] as an option for ophthalmologist given that cataracts need to be removed       [employer]*  (Call [employer]/211 if you need more resources.)    [employer] Financial Assistance  They offer: help uninsured patients who do not qualify for government-sponsored health insurance and cannot afford to pay for their medical care. Insured patients may also qualify depending on family income, family size, and medical needs.   Contact: [phone]   Helpful Info: How to apply-  Option 1: Fill out the Financial Assistance Application(FAP). Copies of the Financial Assistance Application and the FAP may be obtained for free by calling the [employer] customer service department at [phone].   Option 2: download a copy from the [employer] website: https://www.[employer]/patient-resources/financial-assistance     [employer]    Eligibility Assistance: [phone]    [employer]  They offer: medication cost assistance, personal care items, and small durable medical equipment to low income and uninsured patients with chronic health conditions.   Contact: [phone] or [phone]     Wellness Outreach  They offer: connections to financial assistance for social and medical services for low income and uninsured persons.   Contact: [phone] (leave

## 2024-08-20 ENCOUNTER — OFFICE VISIT (OUTPATIENT)
Age: 89
End: 2024-08-20
Payer: MEDICARE

## 2024-08-20 ENCOUNTER — TELEPHONE (OUTPATIENT)
Age: 89
End: 2024-08-20

## 2024-08-20 VITALS
HEIGHT: 60 IN | RESPIRATION RATE: 18 BRPM | DIASTOLIC BLOOD PRESSURE: 73 MMHG | WEIGHT: 118.6 LBS | HEART RATE: 65 BPM | SYSTOLIC BLOOD PRESSURE: 130 MMHG | OXYGEN SATURATION: 99 % | BODY MASS INDEX: 23.29 KG/M2

## 2024-08-20 DIAGNOSIS — L98.9 SKIN LESION: Primary | ICD-10-CM

## 2024-08-20 DIAGNOSIS — K21.9 GASTROESOPHAGEAL REFLUX DISEASE, UNSPECIFIED WHETHER ESOPHAGITIS PRESENT: Primary | ICD-10-CM

## 2024-08-20 DIAGNOSIS — L98.9 HARD SKIN LESION: ICD-10-CM

## 2024-08-20 PROCEDURE — 99214 OFFICE O/P EST MOD 30 MIN: CPT | Performed by: PHYSICIAN ASSISTANT

## 2024-08-20 PROCEDURE — 1123F ACP DISCUSS/DSCN MKR DOCD: CPT | Performed by: PHYSICIAN ASSISTANT

## 2024-08-20 RX ORDER — FAMOTIDINE 20 MG/1
20 TABLET, FILM COATED ORAL NIGHTLY PRN
Qty: 30 TABLET | Refills: 0 | Status: SHIPPED | OUTPATIENT
Start: 2024-08-20

## 2024-08-20 RX ORDER — OMEPRAZOLE 20 MG/1
20 CAPSULE, DELAYED RELEASE ORAL
Qty: 30 CAPSULE | Refills: 0 | Status: SHIPPED | OUTPATIENT
Start: 2024-08-20

## 2024-08-20 NOTE — PROGRESS NOTES
tablet by mouth in the morning and at bedtime BP Support-   Ramsey Peptide Powder= 1.5 g  Grape Seed Extract= 200 mg (Patient not taking: Reported on 8/20/2024)      Multiple Vitamin (MULTIVITAMIN ADULT PO) Take 1 tablet by mouth daily Women's Essential Pack  2X Multi  2X Optimal Omega  2X Magnesium  1X Vitamin D  1X B-Complex (Patient not taking: Reported on 8/20/2024)      Misc. 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 (Patient not taking: Reported on 8/20/2024) 1 each 0    loratadine (CLARITIN) 10 MG tablet Take 1 tablet by mouth daily as needed (allergy symptoms) (Patient not taking: Reported on 2/22/2024) 30 tablet 5    Lidocaine 5 % CREA Topical 5% Lidocaine and 5% Neurontin to apply to affected area up to 4 times/day as needed for pain (Patient not taking: Reported on 8/20/2024) 45 g 2    ascorbic acid (VITAMIN C) 1000 MG tablet Take 100 mg by mouth daily (Patient not taking: Reported on 8/20/2024)      vitamin D 25 MCG (1000 UT) CAPS Take by mouth daily (Patient not taking: Reported on 8/20/2024)       No current facility-administered medications for this visit.       Review of Systems  All other systems reviewed and are negative except as noted above.          Objective     Vitals:    08/20/24 1026   BP: 130/73   Pulse: 65   Resp: 18   SpO2: 99%       Physical Exam  Vitals reviewed.   Constitutional:       General: She is not in acute distress.     Appearance: Normal appearance. She is normal weight. She is not ill-appearing, toxic-appearing or diaphoretic.      Comments: Appears younger than stated age   Eyes:      General: No scleral icterus.  Cardiovascular:      Rate and Rhythm: Normal rate and regular rhythm.      Heart sounds: No murmur heard.  Pulmonary:      Effort: Pulmonary effort is normal. No respiratory distress.      Breath sounds: Normal breath sounds. No wheezing, rhonchi or rales.   Abdominal:      General: There is no

## 2024-08-20 NOTE — TELEPHONE ENCOUNTER
As requested by provider Dermatology referral has been sent to [facility]  [place] ([place] PHONE: [phone]

## 2024-08-20 NOTE — PATIENT INSTRUCTIONS
High-Protein, High-Calorie Smoothie Recipes  Prepared for you by: Melissa Grinnell, PA, Gastroenterology      1. Peanut Butter Banana Smoothie  Ingredients:  1 large banana  2 tablespoons peanut butter  1 cup whole milk (or dairy-free alternative)  1 scoop vanilla protein powder  1 tablespoon honey  1/2 cup Greek yogurt  Ice cubes (optional)  Instructions:  Peel and slice the banana.  Add all ingredients to a .  Blend until smooth.  Pour into a glass and enjoy!  Nutrition Information (approximate):  Calories: 650  Protein: 30g    2. Chocolate Avocado Smoothie  Ingredients:  1 ripe avocado  1 cup chocolate almond milk (or regular milk)  1 scoop chocolate protein powder  2 tablespoons cocoa powder  1 tablespoon westley seeds  1 tablespoon honey or maple syrup  Ice cubes (optional)  Instructions:  Cut the avocado in half, remove the pit, and scoop out the flesh.  Add all ingredients to a .  Blend until creamy and smooth.  Serve immediately.  Nutrition Information (approximate):  Calories: 600  Protein: 25g    3. Berry Oat Smoothie  Ingredients:  1 cup mixed berries (fresh or frozen)  1/2 cup rolled oats  1 cup whole milk (or dairy-free alternative)  1 scoop vanilla protein powder  1 tablespoon almond butter  1 tablespoon honey  1/2 cup Greek yogurt  Instructions:  Add all ingredients to a .  Blend until smooth.  Pour into a glass and enjoy!  Nutrition Information (approximate):  Calories: 550  Protein: 28g    4. Tropical Martin Smoothie  Ingredients:  1 cup frozen martin chunks  1/2 cup coconut milk  1/2 cup Greek yogurt  1 scoop vanilla protein powder  1 tablespoon westley seeds  1 tablespoon honey  Ice cubes (optional)  Instructions:  Add all ingredients to a .  Blend until smooth and creamy.  Serve immediately.  Nutrition Information (approximate):  Calories: 520  Protein: 26g    5. Green Power Smoothie  Ingredients:  1 cup spinach  1/2 avocado  1 banana  1 cup almond milk (or regular milk)  1

## 2024-08-27 ENCOUNTER — LAB (OUTPATIENT)
Dept: INTERNAL MEDICINE CLINIC | Facility: CLINIC | Age: 89
End: 2024-08-27

## 2024-08-27 DIAGNOSIS — E78.5 HYPERLIPIDEMIA LDL GOAL <100: ICD-10-CM

## 2024-08-27 DIAGNOSIS — E55.9 VITAMIN D DEFICIENCY: ICD-10-CM

## 2024-08-27 DIAGNOSIS — I10 ESSENTIAL HYPERTENSION: ICD-10-CM

## 2024-08-27 DIAGNOSIS — I25.119 ATHEROSCLEROSIS OF NATIVE CORONARY ARTERY OF NATIVE HEART WITH ANGINA PECTORIS (HCC): ICD-10-CM

## 2024-08-27 LAB
25(OH)D3 SERPL-MCNC: 65.7 NG/ML (ref 30–100)
ALBUMIN SERPL-MCNC: 4.2 G/DL (ref 3.2–4.6)
ALBUMIN/GLOB SERPL: 1.2 (ref 1–1.9)
ALP SERPL-CCNC: 123 U/L (ref 35–104)
ALT SERPL-CCNC: 38 U/L (ref 12–65)
ANION GAP SERPL CALC-SCNC: 11 MMOL/L (ref 9–18)
APPEARANCE UR: CLEAR
AST SERPL-CCNC: 34 U/L (ref 15–37)
BACTERIA URNS QL MICRO: NEGATIVE /HPF
BASOPHILS # BLD: 0 K/UL (ref 0–0.2)
BASOPHILS NFR BLD: 1 % (ref 0–2)
BILIRUB SERPL-MCNC: 0.3 MG/DL (ref 0–1.2)
BILIRUB UR QL: NEGATIVE
BUN SERPL-MCNC: 14 MG/DL (ref 8–23)
CALCIUM SERPL-MCNC: 9.9 MG/DL (ref 8.8–10.2)
CASTS URNS QL MICRO: 0 /LPF
CHLORIDE SERPL-SCNC: 101 MMOL/L (ref 98–107)
CHOLEST SERPL-MCNC: 228 MG/DL (ref 0–200)
CO2 SERPL-SCNC: 29 MMOL/L (ref 20–28)
COLOR UR: NORMAL
CREAT SERPL-MCNC: 0.81 MG/DL (ref 0.6–1.1)
CRYSTALS URNS QL MICRO: 0 /LPF
DIFFERENTIAL METHOD BLD: ABNORMAL
EOSINOPHIL # BLD: 0 K/UL (ref 0–0.8)
EOSINOPHIL NFR BLD: 0 % (ref 0.5–7.8)
EPI CELLS #/AREA URNS HPF: NORMAL /HPF (ref 0–5)
ERYTHROCYTE [DISTWIDTH] IN BLOOD BY AUTOMATED COUNT: 13.4 % (ref 11.9–14.6)
GLOBULIN SER CALC-MCNC: 3.7 G/DL (ref 2.3–3.5)
GLUCOSE SERPL-MCNC: 87 MG/DL (ref 70–99)
GLUCOSE UR STRIP.AUTO-MCNC: NEGATIVE MG/DL
HCT VFR BLD AUTO: 44.3 % (ref 35.8–46.3)
HDLC SERPL-MCNC: 92 MG/DL (ref 40–60)
HDLC SERPL: 2.5 (ref 0–5)
HGB BLD-MCNC: 14.2 G/DL (ref 11.7–15.4)
HGB UR QL STRIP: NEGATIVE
HYALINE CASTS URNS QL MICRO: NORMAL /LPF
IMM GRANULOCYTES # BLD AUTO: 0 K/UL (ref 0–0.5)
IMM GRANULOCYTES NFR BLD AUTO: 0 % (ref 0–5)
KETONES UR QL STRIP.AUTO: NEGATIVE MG/DL
LDLC SERPL CALC-MCNC: 127 MG/DL (ref 0–100)
LEUKOCYTE ESTERASE UR QL STRIP.AUTO: NEGATIVE
LYMPHOCYTES # BLD: 1.4 K/UL (ref 0.5–4.6)
LYMPHOCYTES NFR BLD: 37 % (ref 13–44)
MCH RBC QN AUTO: 32.1 PG (ref 26.1–32.9)
MCHC RBC AUTO-ENTMCNC: 32.1 G/DL (ref 31.4–35)
MCV RBC AUTO: 100 FL (ref 82–102)
MONOCYTES # BLD: 0.3 K/UL (ref 0.1–1.3)
MONOCYTES NFR BLD: 7 % (ref 4–12)
MUCOUS THREADS URNS QL MICRO: 0 /LPF
NEUTS SEG # BLD: 2.2 K/UL (ref 1.7–8.2)
NEUTS SEG NFR BLD: 55 % (ref 43–78)
NITRITE UR QL STRIP.AUTO: NEGATIVE
NRBC # BLD: 0 K/UL (ref 0–0.2)
PH UR STRIP: 6.5 (ref 5–9)
PLATELET # BLD AUTO: 196 K/UL (ref 150–450)
PMV BLD AUTO: 12.1 FL (ref 9.4–12.3)
POTASSIUM SERPL-SCNC: 4.1 MMOL/L (ref 3.5–5.1)
PROT SERPL-MCNC: 7.9 G/DL (ref 6.3–8.2)
PROT UR STRIP-MCNC: NEGATIVE MG/DL
RBC # BLD AUTO: 4.43 M/UL (ref 4.05–5.2)
RBC #/AREA URNS HPF: NORMAL /HPF (ref 0–5)
SODIUM SERPL-SCNC: 140 MMOL/L (ref 136–145)
SP GR UR REFRACTOMETRY: 1.01 (ref 1–1.02)
TRIGL SERPL-MCNC: 49 MG/DL (ref 0–150)
URINE CULTURE IF INDICATED: NORMAL
UROBILINOGEN UR QL STRIP.AUTO: 0.2 EU/DL (ref 0.2–1)
VLDLC SERPL CALC-MCNC: 10 MG/DL (ref 6–23)
WBC # BLD AUTO: 3.9 K/UL (ref 4.3–11.1)
WBC URNS QL MICRO: NORMAL /HPF (ref 0–4)

## 2024-08-29 ENCOUNTER — TELEPHONE (OUTPATIENT)
Dept: INTERNAL MEDICINE CLINIC | Facility: CLINIC | Age: 89
End: 2024-08-29

## 2024-08-29 NOTE — TELEPHONE ENCOUNTER
----- Message from Angela WHITMORE sent at 8/29/2024 11:14 AM EDT -----  Regarding: [facility] Appointment Request  [facility] Appointment Request    Patient needs appointment for [facility] Appointment Type: Annual Visit.    Patient Requested Dates(s): 9/03/2024  Patient Requested Time: 11:00 AM  Provider Name: Sania Wilhelm PA-C    Reason for Appointment Request: Established Patient - No appointments available during search  --------------------------------------------------------------------------------------------------------------------------    Relationship to Patient: Covered Entity      Call Back Information: OK to leave message on voicemail  Preferred Call Back Number: Phone [phone]

## 2024-08-29 NOTE — PATIENT INSTRUCTIONS
Discussed benefits of Leqvio given intolerance to statin therapy - will depend on cost and ability to coordinate transportation to infusion center but patient is interested if cost is affordable to her  Reminded that she can use Loratadine (Claritin) sent in for her if/when sinus symptoms develop or ears feel like they have excess fluid on them and/or are itchy  Encouraged to consider shingles vaccine (Shingrix) even if previously vaccinated with Zostavax (original live shingles vaccine).  Advised that healthy adults 50 years and older should receive 2 doses of Shingrix -  by 2-6 months.  A prescription will be printed and recommended to be purchased and administered at any local pharmacy  Monitor blood pressure 2-4 times/month to ensure that it stays < 140/90  Continue chronic medications as prescribed  Reminded to stay well hydrated with plenty of water  Recommend waiting until October to receive annual flu vaccine to ensure optimal protection during the entire flu season  Advised to consider 2024 COVID vaccine (best to receive month before or after flu vaccine) given worsened severity of recent COVID cases   Will plan to update prior authorization for branded Norvasc in early Dec 2024 to avoid lapse in coverage     Preventing Falls: Care Instructions  Injuries and health problems such as trouble walking or poor eyesight can increase your risk of falling. So can some medicines. But there are things you can do to help prevent falls. You can exercise to get stronger. You can also arrange your home to make it safer.    Talk to your doctor about the medicines you take. Ask if any of them increase the risk of falls and whether they can be changed or stopped.   Try to exercise regularly. It can help improve your strength and balance. This can help lower your risk of falling.         Practice fall safety and prevention.   Wear low-heeled shoes that fit well and give your feet good support. Talk to your doctor if

## 2024-08-29 NOTE — PROGRESS NOTES
Medicare Annual Wellness Visit    Noni Alcantar is here for Medicare AWV (Annual routine Medicare wellness visit.), Hypertension (6 month routine f/u with lab review. ), and Hyperlipidemia    Assessment & Plan   Medicare annual wellness visit, subsequent  Atherosclerosis of native coronary artery of native heart with angina pectoris (HCC)  -     LEQVIO 284 MG/1.5ML; 284 mg subcutaneously now, in 3 months and then every 6 months, Disp-6 mL, R-0, DAWPrint  Hyperlipidemia LDL goal <100  -     LEQVIO 284 MG/1.5ML; 284 mg subcutaneously now, in 3 months and then every 6 months, Disp-6 mL, R-0, DAWPrint  -     Comprehensive Metabolic Panel; Future  -     Lipid Panel; Future  Need for pneumococcal vaccine  -     Pneumococcal, PCV20, PREVNAR 20, (age 6w+), IM, PF  Need for shingles vaccine  -     zoster recombinant adjuvanted vaccine (SHINGRIX) 50 MCG/0.5ML SUSR injection; Inject 0.5 mLs into the muscle See Admin Instructions 1 dose now and repeat in 2-6 months, Disp-0.5 mL, R-1Print  S/P coronary artery stent placement  Vitamin D deficiency  -     Vitamin D 25 Hydroxy; Future  Essential hypertension  -     CBC with Auto Differential; Future  -     Comprehensive Metabolic Panel; Future  -     Urinalysis with Reflex to Culture; Future        Patient Instructions   Discussed benefits of Leqvio given intolerance to statin therapy - will depend on cost and ability to coordinate transportation to infusion center but patient is interested if cost is affordable to her  Reminded that she can use Loratadine (Claritin) sent in for her if/when sinus symptoms develop or ears feel like they have excess fluid on them and/or are itchy  Encouraged to consider shingles vaccine (Shingrix) even if previously vaccinated with Zostavax (original live shingles vaccine).  Advised that healthy adults 50 years and older should receive 2 doses of Shingrix -  by 2-6 months.  A prescription will be printed and recommended to be purchased and

## 2024-09-03 ENCOUNTER — OFFICE VISIT (OUTPATIENT)
Dept: INTERNAL MEDICINE CLINIC | Facility: CLINIC | Age: 89
End: 2024-09-03
Payer: MEDICARE

## 2024-09-03 VITALS
HEART RATE: 73 BPM | TEMPERATURE: 97.2 F | WEIGHT: 118 LBS | BODY MASS INDEX: 23.16 KG/M2 | OXYGEN SATURATION: 97 % | HEIGHT: 60 IN | DIASTOLIC BLOOD PRESSURE: 72 MMHG | SYSTOLIC BLOOD PRESSURE: 132 MMHG

## 2024-09-03 DIAGNOSIS — I25.119 ATHEROSCLEROSIS OF NATIVE CORONARY ARTERY OF NATIVE HEART WITH ANGINA PECTORIS (HCC): ICD-10-CM

## 2024-09-03 DIAGNOSIS — Z23 NEED FOR SHINGLES VACCINE: ICD-10-CM

## 2024-09-03 DIAGNOSIS — Z23 NEED FOR PNEUMOCOCCAL VACCINE: ICD-10-CM

## 2024-09-03 DIAGNOSIS — E55.9 VITAMIN D DEFICIENCY: ICD-10-CM

## 2024-09-03 DIAGNOSIS — I10 ESSENTIAL HYPERTENSION: ICD-10-CM

## 2024-09-03 DIAGNOSIS — Z00.00 MEDICARE ANNUAL WELLNESS VISIT, SUBSEQUENT: Primary | ICD-10-CM

## 2024-09-03 DIAGNOSIS — E78.5 HYPERLIPIDEMIA LDL GOAL <100: ICD-10-CM

## 2024-09-03 DIAGNOSIS — Z95.5 S/P CORONARY ARTERY STENT PLACEMENT: ICD-10-CM

## 2024-09-03 PROCEDURE — G0439 PPPS, SUBSEQ VISIT: HCPCS | Performed by: PHYSICIAN ASSISTANT

## 2024-09-03 PROCEDURE — G0009 ADMIN PNEUMOCOCCAL VACCINE: HCPCS | Performed by: PHYSICIAN ASSISTANT

## 2024-09-03 PROCEDURE — 90677 PCV20 VACCINE IM: CPT | Performed by: PHYSICIAN ASSISTANT

## 2024-09-03 PROCEDURE — 1123F ACP DISCUSS/DSCN MKR DOCD: CPT | Performed by: PHYSICIAN ASSISTANT

## 2024-09-03 PROCEDURE — 99214 OFFICE O/P EST MOD 30 MIN: CPT | Performed by: PHYSICIAN ASSISTANT

## 2024-09-03 RX ORDER — ZOSTER VACCINE RECOMBINANT, ADJUVANTED 50 MCG/0.5
0.5 KIT INTRAMUSCULAR SEE ADMIN INSTRUCTIONS
Qty: 0.5 ML | Refills: 1 | Status: SHIPPED | OUTPATIENT
Start: 2024-09-03 | End: 2025-03-02

## 2024-09-03 RX ORDER — INCLISIRAN 284 MG/1.5ML
INJECTION, SOLUTION SUBCUTANEOUS
Qty: 6 ML | Refills: 0 | Status: SHIPPED | OUTPATIENT
Start: 2024-09-03

## 2024-09-03 RX ORDER — POLYETHYLENE GLYCOL 3350 17 G/17G
17 POWDER, FOR SOLUTION ORAL DAILY PRN
COMMUNITY

## 2024-09-03 ASSESSMENT — PATIENT HEALTH QUESTIONNAIRE - PHQ9
6. FEELING BAD ABOUT YOURSELF - OR THAT YOU ARE A FAILURE OR HAVE LET YOURSELF OR YOUR FAMILY DOWN: NOT AT ALL
2. FEELING DOWN, DEPRESSED OR HOPELESS: NOT AT ALL
1. LITTLE INTEREST OR PLEASURE IN DOING THINGS: NOT AT ALL
7. TROUBLE CONCENTRATING ON THINGS, SUCH AS READING THE NEWSPAPER OR WATCHING TELEVISION: NOT AT ALL
SUM OF ALL RESPONSES TO PHQ9 QUESTIONS 1 & 2: 0
8. MOVING OR SPEAKING SO SLOWLY THAT OTHER PEOPLE COULD HAVE NOTICED. OR THE OPPOSITE, BEING SO FIGETY OR RESTLESS THAT YOU HAVE BEEN MOVING AROUND A LOT MORE THAN USUAL: NOT AT ALL
3. TROUBLE FALLING OR STAYING ASLEEP: NOT AT ALL
9. THOUGHTS THAT YOU WOULD BE BETTER OFF DEAD, OR OF HURTING YOURSELF: NOT AT ALL
SUM OF ALL RESPONSES TO PHQ QUESTIONS 1-9: 0
5. POOR APPETITE OR OVEREATING: NOT AT ALL
4. FEELING TIRED OR HAVING LITTLE ENERGY: NOT AT ALL
SUM OF ALL RESPONSES TO PHQ QUESTIONS 1-9: 0

## 2024-09-03 ASSESSMENT — ENCOUNTER SYMPTOMS: SHORTNESS OF BREATH: 1

## 2024-09-12 ENCOUNTER — OFFICE VISIT (OUTPATIENT)
Age: 89
End: 2024-09-12
Payer: MEDICARE

## 2024-09-12 VITALS
SYSTOLIC BLOOD PRESSURE: 130 MMHG | HEART RATE: 69 BPM | HEIGHT: 60 IN | WEIGHT: 117 LBS | BODY MASS INDEX: 22.97 KG/M2 | DIASTOLIC BLOOD PRESSURE: 70 MMHG

## 2024-09-12 DIAGNOSIS — I25.118 ATHEROSCLEROSIS OF NATIVE CORONARY ARTERY OF NATIVE HEART WITH STABLE ANGINA PECTORIS (HCC): Primary | ICD-10-CM

## 2024-09-12 DIAGNOSIS — Z95.5 S/P CORONARY ARTERY STENT PLACEMENT: ICD-10-CM

## 2024-09-12 DIAGNOSIS — I34.0 NONRHEUMATIC MITRAL VALVE REGURGITATION: ICD-10-CM

## 2024-09-12 DIAGNOSIS — E78.2 MIXED HYPERLIPIDEMIA: ICD-10-CM

## 2024-09-12 DIAGNOSIS — I35.1 NONRHEUMATIC AORTIC VALVE INSUFFICIENCY: ICD-10-CM

## 2024-09-12 DIAGNOSIS — I10 ESSENTIAL HYPERTENSION: ICD-10-CM

## 2024-09-12 PROCEDURE — 99214 OFFICE O/P EST MOD 30 MIN: CPT | Performed by: INTERNAL MEDICINE

## 2024-09-12 PROCEDURE — 93000 ELECTROCARDIOGRAM COMPLETE: CPT | Performed by: INTERNAL MEDICINE

## 2024-09-12 PROCEDURE — 1123F ACP DISCUSS/DSCN MKR DOCD: CPT | Performed by: INTERNAL MEDICINE

## 2024-09-12 RX ORDER — ROSUVASTATIN CALCIUM 5 MG/1
5 TABLET, COATED ORAL DAILY
Qty: 30 TABLET | Refills: 11 | Status: SHIPPED | OUTPATIENT
Start: 2024-09-12

## 2024-09-12 ASSESSMENT — ENCOUNTER SYMPTOMS
HEMATEMESIS: 0
DOUBLE VISION: 0
HEMOPTYSIS: 0
ABDOMINAL PAIN: 0
HOARSE VOICE: 0
HEMATOCHEZIA: 0
STRIDOR: 0
EYE REDNESS: 0
WHEEZING: 0

## 2024-09-19 ENCOUNTER — TELEPHONE (OUTPATIENT)
Dept: INTERNAL MEDICINE CLINIC | Facility: CLINIC | Age: 89
End: 2024-09-19

## 2025-01-25 DIAGNOSIS — I10 ESSENTIAL HYPERTENSION: ICD-10-CM

## 2025-01-27 RX ORDER — AMLODIPINE BESYLATE 10 MG/1
10 TABLET ORAL EVERY MORNING
Qty: 90 TABLET | Refills: 3 | Status: SHIPPED | OUTPATIENT
Start: 2025-01-27

## 2025-01-27 NOTE — TELEPHONE ENCOUNTER
Requested Prescriptions     Pending Prescriptions Disp Refills    NORVASC 10 MG tablet [Pharmacy Med Name: NORVASC 10 MG TABLET] 90 tablet 3     Sig: TAKE ONE TABLET BY MOUTH EVERY MORNING        Verified rx. Last OV 9/12/24. Erx to pharm on file.

## 2025-02-27 ENCOUNTER — LAB (OUTPATIENT)
Dept: INTERNAL MEDICINE CLINIC | Facility: CLINIC | Age: 89
End: 2025-02-27

## 2025-02-27 DIAGNOSIS — I10 ESSENTIAL HYPERTENSION: ICD-10-CM

## 2025-02-27 DIAGNOSIS — E78.5 HYPERLIPIDEMIA LDL GOAL <100: ICD-10-CM

## 2025-02-27 LAB
ALBUMIN SERPL-MCNC: 4.1 G/DL (ref 3.2–4.6)
ALBUMIN/GLOB SERPL: 1.1 (ref 1–1.9)
ALP SERPL-CCNC: 112 U/L (ref 35–104)
ALT SERPL-CCNC: 33 U/L (ref 8–45)
ANION GAP SERPL CALC-SCNC: 13 MMOL/L (ref 7–16)
AST SERPL-CCNC: 34 U/L (ref 15–37)
BASOPHILS # BLD: 0.01 K/UL (ref 0–0.2)
BASOPHILS NFR BLD: 0.3 % (ref 0–2)
BILIRUB SERPL-MCNC: 0.4 MG/DL (ref 0–1.2)
BUN SERPL-MCNC: 12 MG/DL (ref 8–23)
CALCIUM SERPL-MCNC: 9.8 MG/DL (ref 8.8–10.2)
CHLORIDE SERPL-SCNC: 102 MMOL/L (ref 98–107)
CHOLEST SERPL-MCNC: 172 MG/DL (ref 0–200)
CO2 SERPL-SCNC: 27 MMOL/L (ref 20–29)
CREAT SERPL-MCNC: 0.86 MG/DL (ref 0.6–1.1)
DIFFERENTIAL METHOD BLD: ABNORMAL
EOSINOPHIL # BLD: 0.01 K/UL (ref 0–0.8)
EOSINOPHIL NFR BLD: 0.3 % (ref 0.5–7.8)
ERYTHROCYTE [DISTWIDTH] IN BLOOD BY AUTOMATED COUNT: 13.2 % (ref 11.9–14.6)
GLOBULIN SER CALC-MCNC: 3.7 G/DL (ref 2.3–3.5)
GLUCOSE SERPL-MCNC: 91 MG/DL (ref 70–99)
HCT VFR BLD AUTO: 42.5 % (ref 35.8–46.3)
HDLC SERPL-MCNC: 85 MG/DL (ref 40–60)
HDLC SERPL: 2 (ref 0–5)
HGB BLD-MCNC: 13.7 G/DL (ref 11.7–15.4)
IMM GRANULOCYTES # BLD AUTO: 0.01 K/UL (ref 0–0.5)
IMM GRANULOCYTES NFR BLD AUTO: 0.3 % (ref 0–5)
LDLC SERPL CALC-MCNC: 78 MG/DL (ref 0–100)
LYMPHOCYTES # BLD: 1.43 K/UL (ref 0.5–4.6)
LYMPHOCYTES NFR BLD: 40.9 % (ref 13–44)
MCH RBC QN AUTO: 31.9 PG (ref 26.1–32.9)
MCHC RBC AUTO-ENTMCNC: 32.2 G/DL (ref 31.4–35)
MCV RBC AUTO: 99.1 FL (ref 82–102)
MONOCYTES # BLD: 0.23 K/UL (ref 0.1–1.3)
MONOCYTES NFR BLD: 6.6 % (ref 4–12)
NEUTS SEG # BLD: 1.81 K/UL (ref 1.7–8.2)
NEUTS SEG NFR BLD: 51.6 % (ref 43–78)
NRBC # BLD: 0 K/UL (ref 0–0.2)
PLATELET # BLD AUTO: 210 K/UL (ref 150–450)
PMV BLD AUTO: 11.7 FL (ref 9.4–12.3)
POTASSIUM SERPL-SCNC: 3.9 MMOL/L (ref 3.5–5.1)
PROT SERPL-MCNC: 7.8 G/DL (ref 6.3–8.2)
RBC # BLD AUTO: 4.29 M/UL (ref 4.05–5.2)
SODIUM SERPL-SCNC: 142 MMOL/L (ref 136–145)
TRIGL SERPL-MCNC: 45 MG/DL (ref 0–150)
VLDLC SERPL CALC-MCNC: 9 MG/DL (ref 6–23)
WBC # BLD AUTO: 3.5 K/UL (ref 4.3–11.1)

## 2025-02-27 NOTE — PROGRESS NOTES
Noni Alcantar (:  1935) is a 89 y.o. female,Established patient, here for evaluation of the following chief complaint(s):  Hypertension (6 month routine f/u with lab review. ) and Hyperlipidemia          Assessment/Plan  1. Hyperlipidemia LDL goal <100  -     Comprehensive Metabolic Panel; Future  -     Lipid Panel; Future  2. Situational mixed anxiety and depressive disorder  3. Atherosclerosis of native coronary artery of native heart with angina pectoris  -     Lipid Panel; Future  4. Essential hypertension  -     CBC with Auto Differential; Future  -     Comprehensive Metabolic Panel; Future         Patient Instructions   Reviewed how significantly improved cholesterol values were - gave permission to try reducing to every other day if leg cramps became too bothersome  Re-emphasized the importance of keeping statin on board at some level if at all possible due to coronary artery disease  Reviewed that her L lower leg symptoms are likely a result of underlying lumbar spine pathology  Reminded to stay well hydrated with plenty of water  Continue chronic medications as prescribed  Reviewed options for treating depression and/or referring to counselor but since largely situational we will trust it to improve as circumstances resolve themselves but she agrees to reach out if that doesn't happen      Return in about 6 months (around 9/3/2025) for MWE + routine f/u w/ fasting labs 3-7 days prior.      Subjective   HPI  The patient is a 89 y.o. female who is seen for follow-up of hypertension. She is doing home PT exercising and is somewhat adherent to low salt diet.  Daily caffiene intake: a known amount (none).   Current medication regimen consists of: Norvasc 10 mg daily.  Blood pressure is not measured at home.    BP Readings from Last 3 Encounters:   25 124/80   24 130/70   24 132/72       The patient is a 89 y.o. female who is seen for evaluation of hyperlipidemia.  She was tested

## 2025-03-03 ENCOUNTER — OFFICE VISIT (OUTPATIENT)
Dept: INTERNAL MEDICINE CLINIC | Facility: CLINIC | Age: 89
End: 2025-03-03
Payer: MEDICARE

## 2025-03-03 VITALS
HEART RATE: 75 BPM | DIASTOLIC BLOOD PRESSURE: 80 MMHG | OXYGEN SATURATION: 97 % | TEMPERATURE: 98.1 F | HEIGHT: 60 IN | BODY MASS INDEX: 22.78 KG/M2 | WEIGHT: 116 LBS | SYSTOLIC BLOOD PRESSURE: 124 MMHG

## 2025-03-03 DIAGNOSIS — F43.23 SITUATIONAL MIXED ANXIETY AND DEPRESSIVE DISORDER: ICD-10-CM

## 2025-03-03 DIAGNOSIS — I25.119 ATHEROSCLEROSIS OF NATIVE CORONARY ARTERY OF NATIVE HEART WITH ANGINA PECTORIS: ICD-10-CM

## 2025-03-03 DIAGNOSIS — I10 ESSENTIAL HYPERTENSION: ICD-10-CM

## 2025-03-03 DIAGNOSIS — E78.5 HYPERLIPIDEMIA LDL GOAL <100: Primary | ICD-10-CM

## 2025-03-03 PROCEDURE — 1159F MED LIST DOCD IN RCRD: CPT | Performed by: PHYSICIAN ASSISTANT

## 2025-03-03 PROCEDURE — 99214 OFFICE O/P EST MOD 30 MIN: CPT | Performed by: PHYSICIAN ASSISTANT

## 2025-03-03 PROCEDURE — 1123F ACP DISCUSS/DSCN MKR DOCD: CPT | Performed by: PHYSICIAN ASSISTANT

## 2025-03-03 PROCEDURE — G2211 COMPLEX E/M VISIT ADD ON: HCPCS | Performed by: PHYSICIAN ASSISTANT

## 2025-03-03 PROCEDURE — 1125F AMNT PAIN NOTED PAIN PRSNT: CPT | Performed by: PHYSICIAN ASSISTANT

## 2025-03-03 RX ORDER — AMOXICILLIN 250 MG
2 CAPSULE ORAL DAILY
COMMUNITY

## 2025-03-03 SDOH — ECONOMIC STABILITY: FOOD INSECURITY: WITHIN THE PAST 12 MONTHS, THE FOOD YOU BOUGHT JUST DIDN'T LAST AND YOU DIDN'T HAVE MONEY TO GET MORE.: NEVER TRUE

## 2025-03-03 SDOH — ECONOMIC STABILITY: FOOD INSECURITY: WITHIN THE PAST 12 MONTHS, YOU WORRIED THAT YOUR FOOD WOULD RUN OUT BEFORE YOU GOT MONEY TO BUY MORE.: NEVER TRUE

## 2025-03-03 ASSESSMENT — ANXIETY QUESTIONNAIRES
1. FEELING NERVOUS, ANXIOUS, OR ON EDGE: NEARLY EVERY DAY
7. FEELING AFRAID AS IF SOMETHING AWFUL MIGHT HAPPEN: MORE THAN HALF THE DAYS
5. BEING SO RESTLESS THAT IT IS HARD TO SIT STILL: NOT AT ALL
IF YOU CHECKED OFF ANY PROBLEMS ON THIS QUESTIONNAIRE, HOW DIFFICULT HAVE THESE PROBLEMS MADE IT FOR YOU TO DO YOUR WORK, TAKE CARE OF THINGS AT HOME, OR GET ALONG WITH OTHER PEOPLE: VERY DIFFICULT
3. WORRYING TOO MUCH ABOUT DIFFERENT THINGS: SEVERAL DAYS
GAD7 TOTAL SCORE: 9
6. BECOMING EASILY ANNOYED OR IRRITABLE: NOT AT ALL
2. NOT BEING ABLE TO STOP OR CONTROL WORRYING: NEARLY EVERY DAY
4. TROUBLE RELAXING: NOT AT ALL

## 2025-03-03 ASSESSMENT — PATIENT HEALTH QUESTIONNAIRE - PHQ9
4. FEELING TIRED OR HAVING LITTLE ENERGY: NEARLY EVERY DAY
SUM OF ALL RESPONSES TO PHQ QUESTIONS 1-9: 20
5. POOR APPETITE OR OVEREATING: SEVERAL DAYS
SUM OF ALL RESPONSES TO PHQ QUESTIONS 1-9: 20
2. FEELING DOWN, DEPRESSED OR HOPELESS: NEARLY EVERY DAY
6. FEELING BAD ABOUT YOURSELF - OR THAT YOU ARE A FAILURE OR HAVE LET YOURSELF OR YOUR FAMILY DOWN: NEARLY EVERY DAY
3. TROUBLE FALLING OR STAYING ASLEEP: NEARLY EVERY DAY
SUM OF ALL RESPONSES TO PHQ QUESTIONS 1-9: 20
8. MOVING OR SPEAKING SO SLOWLY THAT OTHER PEOPLE COULD HAVE NOTICED. OR THE OPPOSITE, BEING SO FIGETY OR RESTLESS THAT YOU HAVE BEEN MOVING AROUND A LOT MORE THAN USUAL: NEARLY EVERY DAY
SUM OF ALL RESPONSES TO PHQ QUESTIONS 1-9: 19
7. TROUBLE CONCENTRATING ON THINGS, SUCH AS READING THE NEWSPAPER OR WATCHING TELEVISION: NEARLY EVERY DAY
10. IF YOU CHECKED OFF ANY PROBLEMS, HOW DIFFICULT HAVE THESE PROBLEMS MADE IT FOR YOU TO DO YOUR WORK, TAKE CARE OF THINGS AT HOME, OR GET ALONG WITH OTHER PEOPLE: VERY DIFFICULT
9. THOUGHTS THAT YOU WOULD BE BETTER OFF DEAD, OR OF HURTING YOURSELF: SEVERAL DAYS
1. LITTLE INTEREST OR PLEASURE IN DOING THINGS: NOT AT ALL

## 2025-03-03 ASSESSMENT — ENCOUNTER SYMPTOMS
SHORTNESS OF BREATH: 1
BACK PAIN: 1

## 2025-03-03 NOTE — PATIENT INSTRUCTIONS
Reviewed how significantly improved cholesterol values were - gave permission to try reducing to every other day if leg cramps became too bothersome  Re-emphasized the importance of keeping statin on board at some level if at all possible due to coronary artery disease  Reviewed that her L lower leg symptoms are likely a result of underlying lumbar spine pathology  Reminded to stay well hydrated with plenty of water  Continue chronic medications as prescribed  Reviewed options for treating depression and/or referring to counselor but since largely situational we will trust it to improve as circumstances resolve themselves but she agrees to reach out if that doesn't happen

## 2025-03-20 ENCOUNTER — OFFICE VISIT (OUTPATIENT)
Age: 89
End: 2025-03-20
Payer: MEDICARE

## 2025-03-20 VITALS
SYSTOLIC BLOOD PRESSURE: 118 MMHG | DIASTOLIC BLOOD PRESSURE: 64 MMHG | HEART RATE: 66 BPM | BODY MASS INDEX: 22.65 KG/M2 | HEIGHT: 60 IN

## 2025-03-20 DIAGNOSIS — Z95.5 S/P CORONARY ARTERY STENT PLACEMENT: ICD-10-CM

## 2025-03-20 DIAGNOSIS — I10 ESSENTIAL HYPERTENSION: ICD-10-CM

## 2025-03-20 DIAGNOSIS — I34.0 NONRHEUMATIC MITRAL VALVE REGURGITATION: ICD-10-CM

## 2025-03-20 DIAGNOSIS — I35.1 NONRHEUMATIC AORTIC VALVE INSUFFICIENCY: ICD-10-CM

## 2025-03-20 DIAGNOSIS — E78.2 MIXED HYPERLIPIDEMIA: ICD-10-CM

## 2025-03-20 DIAGNOSIS — I25.118 ATHEROSCLEROSIS OF NATIVE CORONARY ARTERY OF NATIVE HEART WITH STABLE ANGINA PECTORIS: Primary | ICD-10-CM

## 2025-03-20 PROCEDURE — 99214 OFFICE O/P EST MOD 30 MIN: CPT | Performed by: INTERNAL MEDICINE

## 2025-03-20 PROCEDURE — 1123F ACP DISCUSS/DSCN MKR DOCD: CPT | Performed by: INTERNAL MEDICINE

## 2025-03-20 PROCEDURE — 1126F AMNT PAIN NOTED NONE PRSNT: CPT | Performed by: INTERNAL MEDICINE

## 2025-03-20 PROCEDURE — 1159F MED LIST DOCD IN RCRD: CPT | Performed by: INTERNAL MEDICINE

## 2025-03-20 ASSESSMENT — ENCOUNTER SYMPTOMS
HEMOPTYSIS: 0
DOUBLE VISION: 0
HOARSE VOICE: 0
WHEEZING: 0
HEMATEMESIS: 0
EYE REDNESS: 0
ABDOMINAL PAIN: 0
STRIDOR: 0
HEMATOCHEZIA: 0

## 2025-03-20 NOTE — PROGRESS NOTES
amlodipine at current dosing.  Continue aspirin 81 mg daily.    Nonrheumatic mitral valve regurgitation  -Mild mitral regurgitation noted on last echo    Nonrheumatic aortic valve insufficiency  -Mild aortic regurgitation on last echo-euvolemic otherwise on exam    Primary hypertension  -Much better controlled now that she is taking amlodipine 10 mg for the most daily.  On some days she takes 5 mg-half a pill.    Mixed hyperlipidemia  --Continue  low-dose rosuvastatin-if she can handle this, especially important to prevent further obstructive coronary artery disease    S/P coronary artery stent placement  -Previous PCI in 2012-doing well at this point-on aspirin 81 mg daily.  Nuclear stress test from 2011 with normal perfusion.       Overall Impression  -No complaints of any angina at this point unless she overexerts-CCS stage II angina.   She is doing well overall.  She did not want to go through with doing Leqvio but she is doing well on low-dose rosuvastatin therapy.  We would only need to see her in follow-up in about 6 months time but of course we will see her sooner if needed.  I would like for her to go through an echocardiogram but she decided to not go through with it.  Fortunately she is euvolemic on exam with no significant murmurs.      Return in about 6 months (around 9/20/2025) for cad, htn, hld.     Thank you Sania for allowing us to participate in the care of your patient.  If you have any further questions, please do not hesitate to contact us.  Sincerely,        Anthony Prater MD   3/20/2025

## 2025-09-04 ENCOUNTER — LAB (OUTPATIENT)
Dept: INTERNAL MEDICINE CLINIC | Facility: CLINIC | Age: 89
End: 2025-09-04

## 2025-09-04 DIAGNOSIS — E78.5 HYPERLIPIDEMIA LDL GOAL <100: ICD-10-CM

## 2025-09-04 DIAGNOSIS — E55.9 VITAMIN D DEFICIENCY: ICD-10-CM

## 2025-09-04 DIAGNOSIS — I25.119 ATHEROSCLEROSIS OF NATIVE CORONARY ARTERY OF NATIVE HEART WITH ANGINA PECTORIS: ICD-10-CM

## 2025-09-04 DIAGNOSIS — I10 ESSENTIAL HYPERTENSION: ICD-10-CM

## 2025-09-04 LAB
25(OH)D3 SERPL-MCNC: 34.4 NG/ML (ref 30–100)
ALBUMIN SERPL-MCNC: 3.8 G/DL (ref 3.2–4.6)
ALBUMIN/GLOB SERPL: 1.1 (ref 1–1.9)
ALP SERPL-CCNC: 120 U/L (ref 35–104)
ALT SERPL-CCNC: 40 U/L (ref 8–45)
ANION GAP SERPL CALC-SCNC: 11 MMOL/L (ref 7–16)
APPEARANCE UR: CLEAR
AST SERPL-CCNC: 32 U/L (ref 15–37)
BACTERIA URNS QL MICRO: NEGATIVE /HPF
BASOPHILS # BLD: 0.02 K/UL (ref 0–0.2)
BASOPHILS NFR BLD: 0.5 % (ref 0–2)
BILIRUB SERPL-MCNC: 0.3 MG/DL (ref 0–1.2)
BILIRUB UR QL: NEGATIVE
BUN SERPL-MCNC: 16 MG/DL (ref 8–23)
CALCIUM SERPL-MCNC: 9.8 MG/DL (ref 8.8–10.2)
CASTS URNS QL MICRO: 0 /LPF
CHLORIDE SERPL-SCNC: 103 MMOL/L (ref 98–107)
CHOLEST SERPL-MCNC: 184 MG/DL (ref 0–200)
CO2 SERPL-SCNC: 27 MMOL/L (ref 20–29)
COLOR UR: ABNORMAL
CREAT SERPL-MCNC: 0.76 MG/DL (ref 0.6–1.1)
CRYSTALS URNS QL MICRO: 0 /LPF
DIFFERENTIAL METHOD BLD: ABNORMAL
EOSINOPHIL # BLD: 0.01 K/UL (ref 0–0.8)
EOSINOPHIL NFR BLD: 0.3 % (ref 0.5–7.8)
EPI CELLS #/AREA URNS HPF: ABNORMAL /HPF (ref 0–5)
ERYTHROCYTE [DISTWIDTH] IN BLOOD BY AUTOMATED COUNT: 13.7 % (ref 11.9–14.6)
GLOBULIN SER CALC-MCNC: 3.5 G/DL (ref 2.3–3.5)
GLUCOSE SERPL-MCNC: 85 MG/DL (ref 70–99)
GLUCOSE UR STRIP.AUTO-MCNC: NEGATIVE MG/DL
HCT VFR BLD AUTO: 42.2 % (ref 35.8–46.3)
HDLC SERPL-MCNC: 88 MG/DL (ref 40–60)
HDLC SERPL: 2.1 (ref 0–5)
HGB BLD-MCNC: 13.4 G/DL (ref 11.7–15.4)
HGB UR QL STRIP: NEGATIVE
HYALINE CASTS URNS QL MICRO: ABNORMAL /LPF
IMM GRANULOCYTES # BLD AUTO: 0.01 K/UL (ref 0–0.5)
IMM GRANULOCYTES NFR BLD AUTO: 0.3 % (ref 0–5)
KETONES UR QL STRIP.AUTO: NEGATIVE MG/DL
LDLC SERPL CALC-MCNC: 88 MG/DL (ref 0–100)
LEUKOCYTE ESTERASE UR QL STRIP.AUTO: ABNORMAL
LYMPHOCYTES # BLD: 1.06 K/UL (ref 0.5–4.6)
LYMPHOCYTES NFR BLD: 27.7 % (ref 13–44)
MCH RBC QN AUTO: 31.8 PG (ref 26.1–32.9)
MCHC RBC AUTO-ENTMCNC: 31.8 G/DL (ref 31.4–35)
MCV RBC AUTO: 100 FL (ref 82–102)
MONOCYTES # BLD: 0.27 K/UL (ref 0.1–1.3)
MONOCYTES NFR BLD: 7.1 % (ref 4–12)
MUCOUS THREADS URNS QL MICRO: 0 /LPF
NEUTS SEG # BLD: 2.45 K/UL (ref 1.7–8.2)
NEUTS SEG NFR BLD: 64.1 % (ref 43–78)
NITRITE UR QL STRIP.AUTO: NEGATIVE
NRBC # BLD: 0 K/UL (ref 0–0.2)
PH UR STRIP: 7 (ref 5–9)
PLATELET # BLD AUTO: 205 K/UL (ref 150–450)
PMV BLD AUTO: 12 FL (ref 9.4–12.3)
POTASSIUM SERPL-SCNC: 4.1 MMOL/L (ref 3.5–5.1)
PROT SERPL-MCNC: 7.3 G/DL (ref 6.3–8.2)
PROT UR STRIP-MCNC: NEGATIVE MG/DL
RBC # BLD AUTO: 4.22 M/UL (ref 4.05–5.2)
RBC #/AREA URNS HPF: ABNORMAL /HPF (ref 0–5)
SODIUM SERPL-SCNC: 141 MMOL/L (ref 136–145)
SP GR UR REFRACTOMETRY: 1.01 (ref 1–1.02)
TRIGL SERPL-MCNC: 42 MG/DL (ref 0–150)
URINE CULTURE IF INDICATED: ABNORMAL
UROBILINOGEN UR QL STRIP.AUTO: 0.2 EU/DL (ref 0.2–1)
VLDLC SERPL CALC-MCNC: 8 MG/DL (ref 6–23)
WBC # BLD AUTO: 3.8 K/UL (ref 4.3–11.1)
WBC URNS QL MICRO: ABNORMAL /HPF (ref 0–4)

## (undated) DEVICE — CONTAINER PREFIL FRMLN 40ML --

## (undated) DEVICE — FORCEPS BX 240CM JAW 3.2MM L CAP NDL MIC MESH TTH M00513372

## (undated) DEVICE — SYR 3ML LL TIP 1/10ML GRAD --

## (undated) DEVICE — REM POLYHESIVE ADULT PATIENT RETURN ELECTRODE: Brand: VALLEYLAB

## (undated) DEVICE — CANNULA NSL ORAL AD FOR CAPNOFLEX CO2 O2 AIRLFE

## (undated) DEVICE — NDL PRT INJ NSAF BLNT 18GX1.5 --

## (undated) DEVICE — KENDALL RADIOLUCENT FOAM MONITORING ELECTRODE RECTANGULAR SHAPE: Brand: KENDALL

## (undated) DEVICE — SNARE POLYP SM W13MMXL240CM SHTH DIA2.4MM OVL FLX DISP

## (undated) DEVICE — CONNECTOR TBNG OD5-7MM O2 END DISP

## (undated) DEVICE — SYR 5ML 1/5 GRAD LL NSAF LF --